# Patient Record
Sex: FEMALE | Race: WHITE | NOT HISPANIC OR LATINO | Employment: PART TIME | URBAN - METROPOLITAN AREA
[De-identification: names, ages, dates, MRNs, and addresses within clinical notes are randomized per-mention and may not be internally consistent; named-entity substitution may affect disease eponyms.]

---

## 2017-11-19 PROBLEM — G47.30 SLEEP APNEA: Status: ACTIVE | Noted: 2017-11-19

## 2017-11-19 PROBLEM — I21.4 NSTEMI (NON-ST ELEVATED MYOCARDIAL INFARCTION): Status: ACTIVE | Noted: 2017-11-19

## 2017-11-19 PROBLEM — A41.9 SEVERE SEPSIS: Status: ACTIVE | Noted: 2017-11-19

## 2017-11-19 PROBLEM — R50.9 FEBRILE ILLNESS, ACUTE: Status: ACTIVE | Noted: 2017-11-19

## 2017-11-19 PROBLEM — R65.20 SEVERE SEPSIS: Status: ACTIVE | Noted: 2017-11-19

## 2017-11-19 PROBLEM — E66.9 OBESITY (BMI 30-39.9): Status: ACTIVE | Noted: 2017-11-19

## 2017-11-19 PROBLEM — E11.49 TYPE 2 DIABETES MELLITUS WITH NEUROLOGIC COMPLICATION: Status: ACTIVE | Noted: 2017-11-19

## 2017-11-20 PROBLEM — R78.81 BACTEREMIA: Status: ACTIVE | Noted: 2017-11-20

## 2017-11-20 PROBLEM — E66.9 OBESITY (BMI 30-39.9): Status: RESOLVED | Noted: 2017-11-19 | Resolved: 2017-11-20

## 2017-11-20 PROBLEM — G47.30 SLEEP APNEA: Status: RESOLVED | Noted: 2017-11-19 | Resolved: 2017-11-20

## 2017-11-21 PROBLEM — I48.91 ATRIAL FIBRILLATION: Status: ACTIVE | Noted: 2017-11-21

## 2017-11-22 PROBLEM — E87.6 HYPOKALEMIA: Status: ACTIVE | Noted: 2017-11-22

## 2017-11-23 PROBLEM — B95.62 BACTEREMIA DUE TO METHICILLIN RESISTANT STAPHYLOCOCCUS AUREUS: Status: ACTIVE | Noted: 2017-11-20

## 2017-11-25 PROBLEM — I50.32 CHRONIC DIASTOLIC HEART FAILURE: Status: ACTIVE | Noted: 2017-11-25

## 2017-11-26 PROBLEM — I48.19 PERSISTENT ATRIAL FIBRILLATION: Status: ACTIVE | Noted: 2017-11-21

## 2017-11-28 PROBLEM — I50.43 ACUTE ON CHRONIC COMBINED SYSTOLIC AND DIASTOLIC HEART FAILURE: Status: ACTIVE | Noted: 2017-11-25

## 2017-12-02 PROBLEM — I30.1 ACUTE BACTERIAL PERICARDITIS: Chronic | Status: ACTIVE | Noted: 2017-12-02

## 2017-12-02 PROBLEM — B96.89 ACUTE BACTERIAL PERICARDITIS: Chronic | Status: ACTIVE | Noted: 2017-12-02

## 2017-12-02 PROBLEM — I30.1 ACUTE BACTERIAL PERICARDITIS: Status: ACTIVE | Noted: 2017-12-02

## 2017-12-02 PROBLEM — B96.89 ACUTE BACTERIAL PERICARDITIS: Status: ACTIVE | Noted: 2017-12-02

## 2017-12-11 PROBLEM — R53.83 FATIGUE: Status: ACTIVE | Noted: 2017-12-11

## 2017-12-11 PROBLEM — E87.1 HYPONATREMIA: Status: ACTIVE | Noted: 2017-12-11

## 2017-12-11 PROBLEM — E86.0 DEHYDRATION, MODERATE: Status: ACTIVE | Noted: 2017-12-11

## 2017-12-11 PROBLEM — D64.9 SYMPTOMATIC ANEMIA: Status: ACTIVE | Noted: 2017-12-11

## 2017-12-11 PROBLEM — R79.89 LACTIC ACID BLOOD INCREASED: Status: ACTIVE | Noted: 2017-12-11

## 2017-12-12 PROBLEM — E86.0 DEHYDRATION: Status: ACTIVE | Noted: 2017-12-12

## 2017-12-13 PROBLEM — B37.81 CANDIDA ESOPHAGITIS: Status: ACTIVE | Noted: 2017-12-13

## 2017-12-14 PROBLEM — R50.9 FEBRILE ILLNESS, ACUTE: Status: RESOLVED | Noted: 2017-11-19 | Resolved: 2017-12-14

## 2017-12-18 ENCOUNTER — ANTI-COAG VISIT (OUTPATIENT)
Dept: CARDIOLOGY | Facility: CLINIC | Age: 57
End: 2017-12-18

## 2017-12-18 DIAGNOSIS — I48.19 PERSISTENT ATRIAL FIBRILLATION: ICD-10-CM

## 2017-12-18 LAB — INR PPP: 2.4 (ref 2–3)

## 2017-12-18 NOTE — PROGRESS NOTES
lvm for daughter, pt does not know coumadin dose and also need to verify if taking diflucan and iv daptomycin

## 2017-12-19 NOTE — PROGRESS NOTES
I s/w daughter yesterday; she had no idea of any meds or what is gong on with this pt.  She did say she would find out and call [facility] back with the information.  INR went up a full pint in 3 days.  Pt is on diflucan daily x 7days WE NEED TO VERIFY WHETHER SHE IS ACTUALLY TAKING IT.  I called both daughter, whose phone range then went to a busy signal, and pt's phone as well, which has NO VM.  I called [facility] and lvm with Ruby to obtain an INR tomorrow, (fax to follow).  Also, if they are able to reach pt/daughter, to help us ensure that pt does not get warfarin tonight.

## 2017-12-21 ENCOUNTER — ANTI-COAG VISIT (OUTPATIENT)
Dept: CARDIOLOGY | Facility: CLINIC | Age: 57
End: 2017-12-21

## 2017-12-21 DIAGNOSIS — I48.19 PERSISTENT ATRIAL FIBRILLATION: ICD-10-CM

## 2017-12-21 PROBLEM — I50.42 CHRONIC COMBINED SYSTOLIC AND DIASTOLIC HEART FAILURE: Status: ACTIVE | Noted: 2017-11-25

## 2017-12-26 ENCOUNTER — ANTI-COAG VISIT (OUTPATIENT)
Dept: CARDIOLOGY | Facility: CLINIC | Age: 57
End: 2017-12-26

## 2017-12-26 DIAGNOSIS — Z79.01 CURRENT USE OF LONG TERM ANTICOAGULATION: Primary | ICD-10-CM

## 2017-12-26 DIAGNOSIS — I48.19 PERSISTENT ATRIAL FIBRILLATION: ICD-10-CM

## 2017-12-26 LAB — INR PPP: 2.6 (ref 2–3)

## 2017-12-26 RX ORDER — WARFARIN 2 MG/1
TABLET ORAL
Qty: 30 TABLET | Refills: 5 | Status: SHIPPED | OUTPATIENT
Start: 2017-12-26 | End: 2018-10-24

## 2018-01-02 ENCOUNTER — ANTI-COAG VISIT (OUTPATIENT)
Dept: CARDIOLOGY | Facility: CLINIC | Age: 58
End: 2018-01-02

## 2018-01-02 DIAGNOSIS — I48.19 PERSISTENT ATRIAL FIBRILLATION: ICD-10-CM

## 2018-01-02 LAB — INR PPP: 1.5 (ref 2–3)

## 2018-01-09 ENCOUNTER — ANTI-COAG VISIT (OUTPATIENT)
Dept: CARDIOLOGY | Facility: CLINIC | Age: 58
End: 2018-01-09

## 2018-01-09 DIAGNOSIS — I48.19 PERSISTENT ATRIAL FIBRILLATION: ICD-10-CM

## 2018-01-09 LAB — INR PPP: 1.2 (ref 2–3)

## 2018-01-22 NOTE — PROGRESS NOTES
Pt called and states [facility] told pt that they did not have any orders for pt to have inr level done. Pt is currently in [place] and may have level done a op lab or [place] lab pt to call back to inform someone at the [facility]

## 2018-01-23 ENCOUNTER — ANTI-COAG VISIT (OUTPATIENT)
Dept: CARDIOLOGY | Facility: CLINIC | Age: 58
End: 2018-01-23

## 2018-01-23 DIAGNOSIS — I48.19 PERSISTENT ATRIAL FIBRILLATION: ICD-10-CM

## 2018-01-24 NOTE — PROGRESS NOTES
pt informed of dosing and next inr chk date; pt expressed understanding; need pt/inr order faxed to [facility] ([phone])

## 2018-01-29 ENCOUNTER — ANTI-COAG VISIT (OUTPATIENT)
Dept: CARDIOLOGY | Facility: CLINIC | Age: 58
End: 2018-01-29

## 2018-01-29 DIAGNOSIS — I48.19 PERSISTENT ATRIAL FIBRILLATION: ICD-10-CM

## 2018-01-29 LAB — INR PPP: 0.9 (ref 2–3)

## 2018-02-01 ENCOUNTER — ANTI-COAG VISIT (OUTPATIENT)
Dept: CARDIOLOGY | Facility: CLINIC | Age: 58
End: 2018-02-01

## 2018-02-01 DIAGNOSIS — I48.19 PERSISTENT ATRIAL FIBRILLATION: ICD-10-CM

## 2018-02-01 LAB — INR PPP: 1 (ref 2–3)

## 2018-02-06 ENCOUNTER — ANTI-COAG VISIT (OUTPATIENT)
Dept: CARDIOLOGY | Facility: CLINIC | Age: 58
End: 2018-02-06

## 2018-02-06 DIAGNOSIS — I48.19 PERSISTENT ATRIAL FIBRILLATION: ICD-10-CM

## 2018-02-06 LAB — INR PPP: 1.6 (ref 2–3)

## 2018-02-12 NOTE — PROGRESS NOTES
Attempted to call pt to remind her to go to lab on wed due to office being closed tomorrow for holiday no answer and unable to leave voicemail.

## 2018-02-16 ENCOUNTER — ANTI-COAG VISIT (OUTPATIENT)
Dept: CARDIOLOGY | Facility: CLINIC | Age: 58
End: 2018-02-16

## 2018-02-16 DIAGNOSIS — I48.19 PERSISTENT ATRIAL FIBRILLATION: ICD-10-CM

## 2018-02-16 LAB — INR PPP: 1.2 (ref 2–3)

## 2018-02-22 ENCOUNTER — ANTI-COAG VISIT (OUTPATIENT)
Dept: CARDIOLOGY | Facility: CLINIC | Age: 58
End: 2018-02-22

## 2018-02-22 DIAGNOSIS — I48.19 PERSISTENT ATRIAL FIBRILLATION: ICD-10-CM

## 2018-02-22 LAB — INR PPP: 1.5 (ref 2–3)

## 2018-02-26 ENCOUNTER — ANTI-COAG VISIT (OUTPATIENT)
Dept: CARDIOLOGY | Facility: CLINIC | Age: 58
End: 2018-02-26

## 2018-02-26 DIAGNOSIS — I48.19 PERSISTENT ATRIAL FIBRILLATION: ICD-10-CM

## 2018-02-26 LAB — INR PPP: 4.7 (ref 2–3)

## 2018-02-26 NOTE — PROGRESS NOTES
Spoke with pt gave dosing and next inr check date pt voiced understanding.    Pt reports she is taking hair skin and nails but has not even been a week since she has started she states she will discuss with dr christopher

## 2018-02-28 ENCOUNTER — ANTI-COAG VISIT (OUTPATIENT)
Dept: CARDIOLOGY | Facility: CLINIC | Age: 58
End: 2018-02-28

## 2018-02-28 DIAGNOSIS — I48.19 PERSISTENT ATRIAL FIBRILLATION: ICD-10-CM

## 2018-02-28 LAB — INR PPP: 1.9 (ref 2–3)

## 2018-03-05 LAB — INR PPP: 1.2 (ref 2–3)

## 2018-03-06 ENCOUNTER — ANTI-COAG VISIT (OUTPATIENT)
Dept: CARDIOLOGY | Facility: CLINIC | Age: 58
End: 2018-03-06

## 2018-03-06 DIAGNOSIS — I48.19 PERSISTENT ATRIAL FIBRILLATION: ICD-10-CM

## 2018-03-14 ENCOUNTER — ANTI-COAG VISIT (OUTPATIENT)
Dept: CARDIOLOGY | Facility: CLINIC | Age: 58
End: 2018-03-14

## 2018-03-14 DIAGNOSIS — I48.19 PERSISTENT ATRIAL FIBRILLATION: ICD-10-CM

## 2018-03-14 LAB — INR PPP: 3.5 (ref 2–3)

## 2018-03-19 ENCOUNTER — ANTI-COAG VISIT (OUTPATIENT)
Dept: CARDIOLOGY | Facility: CLINIC | Age: 58
End: 2018-03-19

## 2018-03-19 DIAGNOSIS — I48.19 PERSISTENT ATRIAL FIBRILLATION: ICD-10-CM

## 2018-03-19 LAB — INR PPP: 1.1 (ref 2–3)

## 2018-03-22 LAB — INR PPP: 1 (ref 2–3)

## 2018-03-23 ENCOUNTER — ANTI-COAG VISIT (OUTPATIENT)
Dept: CARDIOLOGY | Facility: CLINIC | Age: 58
End: 2018-03-23

## 2018-03-23 DIAGNOSIS — I48.19 PERSISTENT ATRIAL FIBRILLATION: ICD-10-CM

## 2018-03-23 NOTE — PROGRESS NOTES
pt will flush 5mg tablets; pt informed of dosing and next inr chk date; pt expressed understanding

## 2018-03-26 ENCOUNTER — ANTI-COAG VISIT (OUTPATIENT)
Dept: CARDIOLOGY | Facility: CLINIC | Age: 58
End: 2018-03-26

## 2018-03-26 DIAGNOSIS — I48.19 PERSISTENT ATRIAL FIBRILLATION: ICD-10-CM

## 2018-03-26 LAB — INR PPP: 1.2 (ref 2–3)

## 2018-04-02 LAB — INR PPP: 1.1 (ref 2–3)

## 2018-04-03 ENCOUNTER — ANTI-COAG VISIT (OUTPATIENT)
Dept: CARDIOLOGY | Facility: CLINIC | Age: 58
End: 2018-04-03

## 2018-04-03 DIAGNOSIS — I48.19 PERSISTENT ATRIAL FIBRILLATION: ICD-10-CM

## 2018-04-05 LAB — INR PPP: 1.3 (ref 2–3)

## 2018-04-06 ENCOUNTER — ANTI-COAG VISIT (OUTPATIENT)
Dept: CARDIOLOGY | Facility: CLINIC | Age: 58
End: 2018-04-06

## 2018-04-06 DIAGNOSIS — I48.19 PERSISTENT ATRIAL FIBRILLATION: ICD-10-CM

## 2018-04-06 NOTE — PROGRESS NOTES
Informed pt of dose and next check date. Pt voiced understanding. Pt states she took 2 mg last night.

## 2018-04-09 ENCOUNTER — ANTI-COAG VISIT (OUTPATIENT)
Dept: CARDIOLOGY | Facility: CLINIC | Age: 58
End: 2018-04-09

## 2018-04-09 DIAGNOSIS — I48.19 PERSISTENT ATRIAL FIBRILLATION: ICD-10-CM

## 2018-04-09 LAB — INR PPP: 2.3 (ref 2–3)

## 2018-04-12 LAB — INR PPP: 1.9 (ref 2–3)

## 2018-04-13 ENCOUNTER — ANTI-COAG VISIT (OUTPATIENT)
Dept: CARDIOLOGY | Facility: CLINIC | Age: 58
End: 2018-04-13

## 2018-04-13 DIAGNOSIS — I48.19 PERSISTENT ATRIAL FIBRILLATION: ICD-10-CM

## 2018-05-01 LAB — INR PPP: 5.2 (ref 2–3)

## 2018-05-02 ENCOUNTER — ANTI-COAG VISIT (OUTPATIENT)
Dept: CARDIOLOGY | Facility: CLINIC | Age: 58
End: 2018-05-02

## 2018-05-02 DIAGNOSIS — I48.19 PERSISTENT ATRIAL FIBRILLATION: ICD-10-CM

## 2018-05-02 NOTE — PROGRESS NOTES
Spoke to pt.  Dose questioned.  2mg tablet confirmed.  Takes 2mg tablets.  Pt confirms higher dose than was instructed to take since last encounter.  Denies any bleeding problems.  No other changes or problems reported. INR elevated today.  Pt instructed to Hold x 3 days & eat green leafy vegetable tonight. Stated she would eat some Cabbage tonight.  Take 4mg or (2 tabs) 5/5/18 & 5/6/18.  Recheck INR Monday 5/7/18 at [facility].  Pt able to restate instructions.

## 2018-05-07 ENCOUNTER — ANTI-COAG VISIT (OUTPATIENT)
Dept: CARDIOLOGY | Facility: CLINIC | Age: 58
End: 2018-05-07

## 2018-05-07 DIAGNOSIS — I48.19 PERSISTENT ATRIAL FIBRILLATION: ICD-10-CM

## 2018-05-07 LAB — INR PPP: 1.3 (ref 2–3)

## 2018-05-08 NOTE — PROGRESS NOTES
Spoke to pt. Did not take boost 5/7/18.  Took 4mg last HS.  Pt reports she now has 5mg tablets.  No other changes or problems reported. INR low today. Pt to start new 5mg tablets.   Increase 5/8/18. Start new dose & recheck next week at [facility] per Sultana Ford, Pharm D . Pt able to restate instructions.

## 2018-05-17 LAB — INR PPP: 1.4 (ref 2–3)

## 2018-05-18 ENCOUNTER — ANTI-COAG VISIT (OUTPATIENT)
Dept: CARDIOLOGY | Facility: CLINIC | Age: 58
End: 2018-05-18

## 2018-05-18 DIAGNOSIS — I48.19 PERSISTENT ATRIAL FIBRILLATION: ICD-10-CM

## 2018-05-22 LAB — INR PPP: 1.5 (ref 2–3)

## 2018-05-23 ENCOUNTER — ANTI-COAG VISIT (OUTPATIENT)
Dept: CARDIOLOGY | Facility: CLINIC | Age: 58
End: 2018-05-23

## 2018-05-23 DIAGNOSIS — I48.19 PERSISTENT ATRIAL FIBRILLATION: ICD-10-CM

## 2018-05-23 NOTE — PROGRESS NOTES
Spoke with pt gave dosing and next inr check date pt voiced understanding.   Aware to go to lab early pt reports she took 10mg on Tuesday 05/22

## 2018-05-23 NOTE — PROGRESS NOTES
Spoke with pt for questioning she needs to find her paper with the dosing from last week and she will call clinic back for further questioning

## 2018-05-28 LAB — INR PPP: 1.1 (ref 2–3)

## 2018-05-29 ENCOUNTER — ANTI-COAG VISIT (OUTPATIENT)
Dept: CARDIOLOGY | Facility: CLINIC | Age: 58
End: 2018-05-29

## 2018-05-29 DIAGNOSIS — I48.19 PERSISTENT ATRIAL FIBRILLATION: ICD-10-CM

## 2018-05-29 NOTE — PROGRESS NOTES
Patient confirms dose and compliance. Denies missed doses.  No changes or problems reported. INR is subtherapeutic. Will boost today, then increase dose and recheck next week.  Patient verbalizes understanding.  Cc education mailed to patient.

## 2018-06-07 ENCOUNTER — ANTI-COAG VISIT (OUTPATIENT)
Dept: CARDIOLOGY | Facility: CLINIC | Age: 58
End: 2018-06-07

## 2018-06-07 DIAGNOSIS — I48.19 PERSISTENT ATRIAL FIBRILLATION: ICD-10-CM

## 2018-06-07 LAB — INR PPP: 1.9 (ref 2–3)

## 2018-06-29 ENCOUNTER — ANTI-COAG VISIT (OUTPATIENT)
Dept: CARDIOLOGY | Facility: CLINIC | Age: 58
End: 2018-06-29

## 2018-06-29 DIAGNOSIS — I48.19 PERSISTENT ATRIAL FIBRILLATION: ICD-10-CM

## 2018-06-29 LAB — INR PPP: 2.6 (ref 2–3)

## 2018-07-13 NOTE — PROGRESS NOTES
S/w pt and she states that her doctor Dr Christy Bustillo s/w Dr Portillo's and he approved pt to be off the coumadin. This is confounding as coumadin is not typically held for an MRI.  I have sent a message to Dr Portillo's regarding this as well.    I have advised pt to call [facility] back when MD tell her when she can restart, but check status after wed

## 2018-07-13 NOTE — PROGRESS NOTES
Pt reports she is having an MRI C contrast and was told to be off coumadin for 7 days, pt states Dr. Sanchez states this is ok, and also told pt that the radiologist will let her know when she needs to restart her coumadin because she will need to be off coumadin a period of time after the MRI is done.

## 2018-07-19 ENCOUNTER — ANTI-COAG VISIT (OUTPATIENT)
Dept: CARDIOLOGY | Facility: CLINIC | Age: 58
End: 2018-07-19

## 2018-07-19 DIAGNOSIS — I48.19 PERSISTENT ATRIAL FIBRILLATION: ICD-10-CM

## 2018-07-19 LAB — INR PPP: 1.1 (ref 2–3)

## 2018-07-26 LAB — INR PPP: 1.8 (ref 2–3)

## 2018-07-27 ENCOUNTER — ANTI-COAG VISIT (OUTPATIENT)
Dept: CARDIOLOGY | Facility: CLINIC | Age: 58
End: 2018-07-27

## 2018-07-27 DIAGNOSIS — I48.19 PERSISTENT ATRIAL FIBRILLATION: ICD-10-CM

## 2018-08-23 ENCOUNTER — ANTI-COAG VISIT (OUTPATIENT)
Dept: CARDIOLOGY | Facility: CLINIC | Age: 58
End: 2018-08-23

## 2018-08-23 DIAGNOSIS — I48.19 PERSISTENT ATRIAL FIBRILLATION: ICD-10-CM

## 2018-08-23 LAB — INR PPP: 2.8 (ref 2–3)

## 2018-08-24 PROBLEM — I30.1 ACUTE BACTERIAL PERICARDITIS: Chronic | Status: RESOLVED | Noted: 2017-12-02 | Resolved: 2018-08-24

## 2018-08-24 PROBLEM — I51.89 DIASTOLIC DYSFUNCTION: Status: ACTIVE | Noted: 2018-08-24

## 2018-08-24 PROBLEM — I25.2 HISTORY OF NON-ST ELEVATION MYOCARDIAL INFARCTION (NSTEMI): Status: ACTIVE | Noted: 2017-11-19

## 2018-08-24 PROBLEM — A41.9 SEVERE SEPSIS: Status: RESOLVED | Noted: 2017-11-19 | Resolved: 2018-08-24

## 2018-08-24 PROBLEM — B37.81 CANDIDA ESOPHAGITIS: Status: RESOLVED | Noted: 2017-12-13 | Resolved: 2018-08-24

## 2018-08-24 PROBLEM — D64.9 SYMPTOMATIC ANEMIA: Status: RESOLVED | Noted: 2017-12-11 | Resolved: 2018-08-24

## 2018-08-24 PROBLEM — B96.89 ACUTE BACTERIAL PERICARDITIS: Chronic | Status: RESOLVED | Noted: 2017-12-02 | Resolved: 2018-08-24

## 2018-08-24 PROBLEM — E87.6 HYPOKALEMIA: Status: RESOLVED | Noted: 2017-11-22 | Resolved: 2018-08-24

## 2018-08-24 PROBLEM — Z79.4 TYPE 2 DIABETES MELLITUS WITHOUT COMPLICATION, WITH LONG-TERM CURRENT USE OF INSULIN: Status: RESOLVED | Noted: 2017-11-19 | Resolved: 2018-08-24

## 2018-08-24 PROBLEM — E11.9 TYPE 2 DIABETES MELLITUS WITHOUT COMPLICATION, WITH LONG-TERM CURRENT USE OF INSULIN: Status: RESOLVED | Noted: 2017-11-19 | Resolved: 2018-08-24

## 2018-08-24 PROBLEM — R78.81 BACTEREMIA DUE TO METHICILLIN RESISTANT STAPHYLOCOCCUS AUREUS: Status: RESOLVED | Noted: 2017-11-20 | Resolved: 2018-08-24

## 2018-08-24 PROBLEM — E86.0 DEHYDRATION: Status: RESOLVED | Noted: 2017-12-12 | Resolved: 2018-08-24

## 2018-08-24 PROBLEM — Z79.4 TYPE 2 DIABETES MELLITUS WITHOUT COMPLICATION, WITH LONG-TERM CURRENT USE OF INSULIN: Status: ACTIVE | Noted: 2017-11-19

## 2018-08-24 PROBLEM — E86.0 DEHYDRATION, MODERATE: Status: RESOLVED | Noted: 2017-12-11 | Resolved: 2018-08-24

## 2018-08-24 PROBLEM — E11.9 TYPE 2 DIABETES MELLITUS WITHOUT COMPLICATION, WITH LONG-TERM CURRENT USE OF INSULIN: Status: ACTIVE | Noted: 2017-11-19

## 2018-08-24 PROBLEM — R65.20 SEVERE SEPSIS: Status: RESOLVED | Noted: 2017-11-19 | Resolved: 2018-08-24

## 2018-08-24 PROBLEM — B95.62 BACTEREMIA DUE TO METHICILLIN RESISTANT STAPHYLOCOCCUS AUREUS: Status: RESOLVED | Noted: 2017-11-20 | Resolved: 2018-08-24

## 2018-08-24 PROBLEM — E87.1 HYPONATREMIA: Status: RESOLVED | Noted: 2017-12-11 | Resolved: 2018-08-24

## 2018-09-13 NOTE — PROGRESS NOTES
9/13/18 @7079  YORDAN [facility] [phone] (t), [phone] (f) called for holding instructions for 6 upper, 4 lower tooth extractions for dentures, date to be decided when holding instructions received.  Justa to return call with scheduled date.  Routed to PharmD for dosing, pending fax to [facility].

## 2018-09-14 NOTE — PROGRESS NOTES
Patient ok to hold warfarin 3 days prior to procedure. Please let us know procedure date so we can advise patient of holding instructions and set up follow up visit.

## 2018-09-21 PROBLEM — R53.83 FATIGUE: Status: RESOLVED | Noted: 2017-12-11 | Resolved: 2018-09-21

## 2018-09-21 PROBLEM — I48.19 PERSISTENT ATRIAL FIBRILLATION: Status: RESOLVED | Noted: 2017-11-21 | Resolved: 2018-09-21

## 2019-02-17 PROBLEM — I30.9 ACUTE PERICARDITIS: Status: ACTIVE | Noted: 2019-02-17

## 2019-02-17 PROBLEM — I21.4 NSTEMI (NON-ST ELEVATED MYOCARDIAL INFARCTION): Status: ACTIVE | Noted: 2019-02-17

## 2019-02-17 PROBLEM — I48.0 PAROXYSMAL ATRIAL FIBRILLATION: Status: ACTIVE | Noted: 2019-02-17

## 2019-02-17 PROBLEM — I38 ENDOCARDITIS: Status: ACTIVE | Noted: 2019-02-17

## 2019-02-17 PROBLEM — I31.9 PERICARDITIS: Status: ACTIVE | Noted: 2019-02-17

## 2019-02-17 PROBLEM — G47.33 OSA (OBSTRUCTIVE SLEEP APNEA): Status: ACTIVE | Noted: 2017-11-19

## 2019-02-18 PROBLEM — A41.9 SEPSIS: Status: ACTIVE | Noted: 2019-02-18

## 2019-02-18 PROBLEM — R50.9 FEVER: Status: ACTIVE | Noted: 2019-02-18

## 2019-02-18 PROBLEM — D50.9 IRON DEFICIENCY ANEMIA: Status: ACTIVE | Noted: 2019-02-18

## 2019-02-18 PROBLEM — R05.9 COUGH: Status: ACTIVE | Noted: 2019-02-18

## 2019-02-19 PROBLEM — R78.81 POSITIVE BLOOD CULTURE: Status: ACTIVE | Noted: 2019-02-19

## 2019-02-22 PROBLEM — R79.89 TROPONIN LEVEL ELEVATED: Status: ACTIVE | Noted: 2019-02-22

## 2019-02-24 PROBLEM — E66.9 OBESITY: Status: ACTIVE | Noted: 2019-02-24

## 2019-03-01 ENCOUNTER — TELEPHONE (OUTPATIENT)
Dept: INFECTIOUS DISEASES | Facility: CLINIC | Age: 59
End: 2019-03-01

## 2019-03-01 NOTE — TELEPHONE ENCOUNTER
----- Message from Kim Dean sent at 3/1/2019 12:27 PM CST -----  Type: Needs Medical Advice    Who Called:  Carolina with [facility]  Best Call Back Number: [phone]  Additional Information: Requesting to speak with nurse/please call back (only information given)

## 2019-03-06 ENCOUNTER — TELEPHONE (OUTPATIENT)
Dept: INFECTIOUS DISEASES | Facility: CLINIC | Age: 59
End: 2019-03-06

## 2019-03-06 PROBLEM — Z79.01 CHRONIC ANTICOAGULATION: Status: ACTIVE | Noted: 2019-03-06

## 2019-03-06 NOTE — TELEPHONE ENCOUNTER
Spoke with Sandra and instructed per Dr Lazcano-Do not send out any Vanco until she sees the creatinine results from todays's lab draw at [facility], which will be resulted later this afternoon, as the specimen is still at the [facility] awaiting  delivery to [facility]. Understanding verbalized.

## 2019-03-06 NOTE — TELEPHONE ENCOUNTER
----- Message from Jai Randall sent at 3/6/2019  4:19 PM CST -----  Contact: Sandra Martino  Type:  Patient Returning Call    Who Called:  Sandra Martino  Who Left Message for Patient:  Mukeshblanquitazenobia  Does the patient know what this is regarding?:  medication  Best Call Back Number:  [phone]  Ask for Andres Flores

## 2019-03-11 ENCOUNTER — TELEPHONE (OUTPATIENT)
Dept: INFECTIOUS DISEASES | Facility: CLINIC | Age: 59
End: 2019-03-11

## 2019-03-11 NOTE — TELEPHONE ENCOUNTER
----- Message from Karmen Riberajumana sent at 3/11/2019  2:50 PM CDT -----  Contact: Sandra with [facility]  Sandra states that the patients initial stop date is tomorrow  for her IV antibiotics, do you want to make up the days she missed or just discontinue tomorrow.  Call back at [phone].  Thanks

## 2019-03-11 NOTE — TELEPHONE ENCOUNTER
----- Message from Karmen Riberajumana sent at 3/11/2019  2:50 PM CDT -----  Contact: Sandra with [facility]  Sandra states that the patients initial stop date is tomorrow  for her IV antibiotics, do you want to make up the days she missed or just discontinue tomorrow.  Call back at [phone].  Thanks

## 2019-03-11 NOTE — TELEPHONE ENCOUNTER
----- Message from Karmen Riberajumana sent at 3/11/2019  2:50 PM CDT -----  Contact: Sandra with [facility]  Sandra states that the patients initial stop date is tomorrow  for her IV antibiotics, do you want to make up the days she missed or just discontinue tomorrow.  Call back at [phone].  Thanks

## 2019-03-11 NOTE — TELEPHONE ENCOUNTER
Returned call-Sandra not in-Spoke with Andres and instructed that the doses were held because the trough was not therapeutic and Dr Lazcano does not want to add those doses on to the end of therapy which is supposed to be 3/14. This SN instructed Andres to change the EOC from 3/12 to the original EOC that was ordered by Dr Lazcano and this date was also on the D/C summary. Understanding verbalized.

## 2019-03-21 ENCOUNTER — TELEPHONE (OUTPATIENT)
Dept: INFECTIOUS DISEASES | Facility: CLINIC | Age: 59
End: 2019-03-21

## 2019-03-21 NOTE — TELEPHONE ENCOUNTER
----- Message from Una Stuart sent at 3/21/2019  3:02 PM CDT -----  Contact: Sandra from [facility]  Type: Needs Medical Advice    Who Called:  Sandra from [facility]  Best Call Back Number: [phone]  Additional Information: would like for the nurse to give her a call back

## 2019-05-09 NOTE — PROGRESS NOTES
Please get the date of the procedure or have patient call with the date once scheduled.    Admit/Transfer to Inpatient Psychiatry

## 2019-11-19 PROBLEM — K92.2 GI BLEED: Status: ACTIVE | Noted: 2019-11-19

## 2019-11-19 PROBLEM — D64.9 SYMPTOMATIC ANEMIA: Status: ACTIVE | Noted: 2019-11-19

## 2019-11-19 PROBLEM — E11.9 DIABETES MELLITUS: Status: ACTIVE | Noted: 2019-11-19

## 2019-11-19 PROBLEM — K59.00 CONSTIPATION: Status: ACTIVE | Noted: 2019-11-19

## 2019-12-06 ENCOUNTER — TELEPHONE (OUTPATIENT)
Dept: HEMATOLOGY/ONCOLOGY | Facility: CLINIC | Age: 59
End: 2019-12-06

## 2019-12-06 NOTE — TELEPHONE ENCOUNTER
Spoke to patient and the ER  has referred patient to hematology due to patient needing Iron.  Told patient I would call her back next week with appointment.

## 2019-12-19 ENCOUNTER — INITIAL CONSULT (OUTPATIENT)
Dept: HEMATOLOGY/ONCOLOGY | Facility: CLINIC | Age: 59
End: 2019-12-19
Payer: MEDICAID

## 2019-12-19 VITALS
HEIGHT: 63 IN | HEART RATE: 97 BPM | OXYGEN SATURATION: 98 % | DIASTOLIC BLOOD PRESSURE: 62 MMHG | BODY MASS INDEX: 37.5 KG/M2 | TEMPERATURE: 99 F | RESPIRATION RATE: 18 BRPM | SYSTOLIC BLOOD PRESSURE: 116 MMHG | WEIGHT: 211.63 LBS

## 2019-12-19 DIAGNOSIS — I25.2 HISTORY OF NON-ST ELEVATION MYOCARDIAL INFARCTION (NSTEMI): ICD-10-CM

## 2019-12-19 DIAGNOSIS — E66.01 CLASS 2 SEVERE OBESITY WITH SERIOUS COMORBIDITY AND BODY MASS INDEX (BMI) OF 37.0 TO 37.9 IN ADULT, UNSPECIFIED OBESITY TYPE: ICD-10-CM

## 2019-12-19 DIAGNOSIS — Z79.4 TYPE 2 DIABETES MELLITUS WITHOUT COMPLICATION, WITH LONG-TERM CURRENT USE OF INSULIN: ICD-10-CM

## 2019-12-19 DIAGNOSIS — D50.9 IRON DEFICIENCY ANEMIA, UNSPECIFIED IRON DEFICIENCY ANEMIA TYPE: Primary | ICD-10-CM

## 2019-12-19 DIAGNOSIS — E11.9 TYPE 2 DIABETES MELLITUS WITHOUT COMPLICATION, WITH LONG-TERM CURRENT USE OF INSULIN: ICD-10-CM

## 2019-12-19 PROBLEM — I30.9 ACUTE PERICARDITIS: Status: RESOLVED | Noted: 2019-02-17 | Resolved: 2019-12-19

## 2019-12-19 PROBLEM — R79.89 LACTIC ACID BLOOD INCREASED: Status: RESOLVED | Noted: 2017-12-11 | Resolved: 2019-12-19

## 2019-12-19 PROCEDURE — 99999 PR PBB SHADOW E&M-EST. PATIENT-LVL III: ICD-10-PCS | Mod: PBBFAC,,, | Performed by: INTERNAL MEDICINE

## 2019-12-19 PROCEDURE — 99213 OFFICE O/P EST LOW 20 MIN: CPT | Mod: PBBFAC,PN | Performed by: INTERNAL MEDICINE

## 2019-12-19 PROCEDURE — 99205 OFFICE O/P NEW HI 60 MIN: CPT | Mod: S$PBB,,, | Performed by: INTERNAL MEDICINE

## 2019-12-19 PROCEDURE — 99999 PR PBB SHADOW E&M-EST. PATIENT-LVL III: CPT | Mod: PBBFAC,,, | Performed by: INTERNAL MEDICINE

## 2019-12-19 PROCEDURE — 99205 PR OFFICE/OUTPT VISIT, NEW, LEVL V, 60-74 MIN: ICD-10-PCS | Mod: S$PBB,,, | Performed by: INTERNAL MEDICINE

## 2019-12-19 NOTE — PROGRESS NOTES
INITIAL CONSULTATION     DATE: 12/19/2019  Patient Name: Amanda Barton  Referred by:   Reason for referral: anemia      Subjective:       Patient ID: Amanda Barton is a 59 y.o. female.    Chief Complaint: Anemia    HPI      REVIEW OF RECORDS PRIOR TO VISIT SHOW:  06/20/2008 hemoglobin 12.9, platelets and white count normal, MCV 86 and normal RDW  11/19/2017 hemoglobin 10.3, normal RDW and white count.  This was admission 2 weeks, infection MRSA and blood, anemia  12/11/2017 iron sat 12, iron serum normal 48  12/14/2017 hemoglobin 7.0, normal white count and platelets  12/20/2017 hemoglobin 10  08/24/2018 hemoglobin 10.6  02/17/2019 ferritin is 66, CRP 21.9, iron sat 7, iron serum 21  02/21/2019 received blood transfusion 1 unit  02/26/2019 hemoglobin 8.5  03/06/2019 hemoglobin is 10, MCV low 78, RDW high 18, normal platelets and white count  11/19/2019 transferred from [facility] with anemia and heme-positive stool, on NSAIDs for back pain and longstanding history of anemia per discharge summary, discharge 11/22 11/19/2019 received blood transfusion  11/20/2019 ferritin is 16  11/21/19 - ferric gluconate IV 125mg  11/22/2019 hemoglobin is 8.5  11/22/19 - EGD - gastritis, otherwise normal  12/12/2019 ferritin is 10, hemoglobin is 9.7    The patient presents today in confirms above history and reports overall somewhat poor health, uncontrolled diabetes depression.  Reviewed recent hospitalization details.  Long history iron deficiency anemia.  Reports she received 1 dose of IV iron in the hospital recently which on review appears to be 125 mg IV, in addition to her blood transfusions.  She otherwise has not tolerated oral iron  She reports she is scheduled for IV iron on 12/24 and 1231 already prior to coming here today.    Patient is on Coumadin prior to now, stopped last hospitalization due to bleeding.   She has been on thsi since endocarditis fall 2017 with cardiology, Dr. Steve, per her report  "follows up with him today the record reflects this is more likely due to paroxysmal atrial fibrillation   She denies valve surgery, heart surgery    She reports new pain and numbness left arm and intensity discussed with Dr. christopher today   She denies CAD to her knowledge but then reports "light heart attack" in past.     She reports DM "bad", 's yesterday, has been hard to control with recent admissions  She feels change in insulin has decreased control of her BG. This was changed to previous regimen yesterday  so she is hopeful this will improve. Yamile Figueroa is primary.      232 to 212 weight loss since 2019, appetite is decreased    Social situation complex, father and brother  holidays, mother with dementia that her and her sister care for, one of her kids in assisted, she adopted three grandkids who cannot afford dianne this year.   Increased cymbalta and on wellbutrin and starting xanax.   insomnia      Past Medical History:   Diagnosis Date    Acute bacterial pericarditis 2017    Anemia     Bacteremia due to methicillin resistant Staphylococcus aureus 2017    Diabetes mellitus     Encounter for blood transfusion     GERD (gastroesophageal reflux disease)     Heart murmur     Hypertension     Migraine headache     Type 2 diabetes mellitus without complication, with long-term current use of insulin 2017     Past Surgical History:   Procedure Laterality Date    APPENDECTOMY       SECTION      CHOLECYSTECTOMY      COLONOSCOPY N/A 2017    Procedure: COLONOSCOPY;  Surgeon: Juan Lepe MD;  Location: [facility];  Service: Endoscopy;  Laterality: N/A;    ESOPHAGOGASTRODUODENOSCOPY N/A 2019    Procedure: EGD (ESOPHAGOGASTRODUODENOSCOPY);  Surgeon: Gustavo Austin MD;  Location: [facility];  Service: Endoscopy;  Laterality: N/A;  with Push Enteroscopy    ESOPHAGOGASTRODUODENOSCOPY N/A 2019    Procedure: EGD " (ESOPHAGOGASTRODUODENOSCOPY);  Surgeon: Gustavo Austin MD;  Location: [facility];  Service: Endoscopy;  Laterality: N/A;  Push enteroscopy    HYSTERECTOMY       Social History     Socioeconomic History    Marital status:      Spouse name: Not on file    Number of children: Not on file    Years of education: Not on file    Highest education level: Not on file   Occupational History    Not on file   Social Needs    Financial resource strain: Not on file    Food insecurity:     Worry: Not on file     Inability: Not on file    Transportation needs:     Medical: Not on file     Non-medical: Not on file   Tobacco Use    Smoking status: Never Smoker    Smokeless tobacco: Never Used   Substance and Sexual Activity    Alcohol use: No    Drug use: Not on file    Sexual activity: Not on file   Lifestyle    Physical activity:     Days per week: Not on file     Minutes per session: Not on file    Stress: Not on file   Relationships    Social connections:     Talks on phone: Not on file     Gets together: Not on file     Attends Rastafarian service: Not on file     Active member of club or organization: Not on file     Attends meetings of clubs or organizations: Not on file     Relationship status: Not on file   Other Topics Concern    Not on file   Social History Narrative    Not on file     Family History   Problem Relation Age of Onset    No Known Problems Mother     Heart disease Father     Heart failure Father     Arrhythmia Father        Current Outpatient Medications   Medication Sig Dispense Refill    aspirin (ECOTRIN) 81 MG EC tablet Take 1 tablet (81 mg total) by mouth once daily. 30 tablet 11    blood sugar diagnostic (TRUETRACK TEST) Strp       buPROPion (WELLBUTRIN SR) 100 MG TBSR 12 hr tablet Take 1 tablet by mouth 2 (two) times daily.      carvedilol (COREG) 3.125 MG tablet Take 3.125 mg by mouth 2 (two) times daily.      doxepin (SINEQUAN) 25 MG capsule Take 25 mg by mouth  once daily.  5    DULoxetine (CYMBALTA) 60 MG capsule Take 60 mg by mouth once daily.   5    folic acid (FOLVITE) 1 MG tablet Take 1 mg by mouth Daily.      gabapentin (NEURONTIN) 800 MG tablet Take 800 mg by mouth 3 (three) times daily.       hydrocortisone 2.5 % ointment 1 application as needed (itching).       hydrOXYzine HCl (ATARAX) 25 MG tablet Take 25 mg by mouth nightly.  2    insulin lispro (HUMALOG KWIKPEN INSULIN) 100 unit/mL pen Inject 25 Units into the skin 3 (three) times daily with meals. 22.5 mL 11    JARDIANCE 25 mg Tab Take 1 tablet by mouth once daily.  5    lansoprazole (PREVACID 24HR) 15 MG capsule Take 1 capsule by mouth 2 (two) times daily.       metFORMIN (GLUCOPHAGE) 1000 MG tablet Take 1 tablet by mouth 2 (two) times daily.  1    montelukast (SINGULAIR) 10 mg tablet Take 10 mg by mouth every evening.  6    promethazine (PHENERGAN) 12.5 MG Tab Take 1 tablet (12.5 mg total) by mouth every 6 (six) hours as needed. 10 tablet 0    sennosides (SENNA) 8.6 mg Cap Take 1 capsule by mouth once daily. Hold if with diarrhea  0    TRESIBA FLEXTOUCH U-200 200 unit/mL (3 mL) InPn Inject 100 Units into the skin every evening.  5    ferrous sulfate 324 mg (65 mg iron) TbEC Take 1 tablet (324 mg total) by mouth 2 (two) times daily. (Patient not taking: Reported on 12/12/2019) 60 tablet 0     No current facility-administered medications for this visit.      ALLERGIES REVIEWED    Review of Systems    Constitutional:  Positive for activity change, appetite change, positive fatigue, fever and unexpected weight change.   HENT: Negative for mouth sores and sore throat.    Respiratory: Negative for cough and shortness of breath.    Cardiovascular: Negative for chest pain, palpitations and leg swelling.   Gastrointestinal: Negative for abdominal pain, constipation and diarrhea.   Genitourinary: Negative for dysuria and frequency.   Musculoskeletal: Negative for back pain and joint swelling.  "  Neurological:  Positive for weakness, light-headedness and numbness.   Hematological: Does not bruise/bleed easily.   Skin: no rash, no lesions, no itching    Objective:      /62   Pulse 97   Temp 98.7 °F (37.1 °C) (Oral)   Resp 18   Ht 5' 3" (1.6 m)   Wt 96 kg (211 lb 10.3 oz)   SpO2 98%   BMI 37.49 kg/m²    Wt Readings from Last 25 Encounters:   12/19/19 96 kg (211 lb 10.3 oz)   12/12/19 96.3 kg (212 lb 3.2 oz)   11/19/19 95.7 kg (211 lb)   03/06/19 98.6 kg (217 lb 6.4 oz)   03/01/19 103 kg (227 lb)   02/18/19 103.4 kg (227 lb 15.3 oz)   01/30/19 108.4 kg (239 lb)   10/24/18 102.1 kg (225 lb)   09/21/18 100.3 kg (221 lb 1.6 oz)   08/24/18 103.8 kg (228 lb 14.4 oz)   07/27/18 104.8 kg (231 lb)   04/11/18 97.5 kg (215 lb)   01/02/18 97.1 kg (214 lb)   12/28/17 95.4 kg (210 lb 4.8 oz)   12/20/17 94.2 kg (207 lb 11.2 oz)   12/15/17 96.8 kg (213 lb 6.5 oz)   12/04/17 95.5 kg (210 lb 8.6 oz)       Physical Exam    Constitutional: She is oriented to person, place, and time. No distress. Overweight.  Disheveled. Appears depressed.  HENT: Mouth/Throat: Oropharynx is clear and moist. No oropharyngeal exudate.   Eyes: Conjunctivae and EOM are normal.   Neck: Normal range of motion. Neck supple.   Cardiovascular: Normal rate, regular rhythm, normal heart sounds and intact distal pulses.    Pulmonary/Chest: Effort normal and breath sounds normal. She has no wheezes. She has no rales.   Abdominal: Soft. Bowel sounds are normal. She exhibits no distension. There is no tenderness.   Musculoskeletal: She exhibits no edema or tenderness.   Lymphadenopathy:   She has no cervical adenopathy.   Neurological: She is alert and oriented to person, place, and time. She has normal strength. No cranial nerve deficit or sensory deficit.   Skin: Skin is warm and dry. No rash noted. No erythema.  Skin excoriations noted face arms  Psychiatric: She has a depressed mood and affect. Her speech is normal.   Nursing note and vitals " reviewed.    No results found for this or any previous visit (from the past 72 hour(s)).    Assessment:       1. Iron deficiency anemia, unspecified iron deficiency anemia type    2. Type 2 diabetes mellitus without complication, with long-term current use of insulin    3. History of non-ST elevation myocardial infarction (NSTEMI)          ECOG 2    Patient is a  59 y.o. female here with diagnosis iron deficiency thought secondary chronic GI bleeding on anticoagulation as well as requires NSAIDs for chronic back pain. Most recent labs indicate improvement after in hospital admission and transfusion of blood and IV iron and she reports she already has IV iron scheduled in the next 2 weeks x2 doses.  Agree with plan and oral iron not tolerated per her report.     Discussed differential diagnosis, work-up, in great detail with patient, including cause GI bleeding especially with anticoagulation, this may improve now that she is off Coumadin and NSAIDs that she will require intensive the future periodically due to chronic pain and she reports is not in given option for any other pain medication.    Also reviewed her depression and uncontrolled diabetes importance of continuing follow-up, she has recent medication change hopefully this will help as well as in her iron deficiency is corrected will likely feel improved also, advised weight loss and exercise.            Plan:       Amanda was seen today for anemia.    Diagnoses and all orders for this visit:    Iron deficiency anemia, unspecified iron deficiency anemia type  -     CBC auto differential; Standing  -     Comprehensive metabolic panel; Future  -     Ferritin; Standing  -     Iron and TIBC; Standing    Type 2 diabetes mellitus without complication, with long-term current use of insulin    History of non-ST elevation myocardial infarction (NSTEMI)            PLAN:    MD vis in 4-5 mo with lab cbc, ferritin, cmp, iron profile  We will monitor to see if iron  deficiency recurs after replacement next week    Exercise/nutrition    Important to keep follow-up with PCP and GI    Discussed plan above in great detail with patient and all questions answered to their satisfaction. Proceed with plan above.       Thank you for the referral. Please call with any questions.           Bebe Spear M.D.  Hematology Oncology  [facility]

## 2019-12-19 NOTE — LETTER
December 19, 2019      No Recipients           [facility] [place] [id number] [address]  Phone: [phone]  Fax: [phone]          Patient: Amanda Barton   MR Number: [id number]   YOB: 1960   Date of Visit: 12/19/2019       Dear :    Thank you for referring Amanda Barton to me for evaluation. Attached you will find relevant portions of my assessment and plan of care.    If you have questions, please do not hesitate to call me. I look forward to following Amanda Barton along with you.    Sincerely,    Bebe Spear MD    Enclosure  CC:  No Recipients    If you would like to receive this communication electronically, please contact [email or web address] or [phone] to request more information on [facility] Link access.    For providers and/or their staff who would like to refer a patient to [facility], please contact us through our one-stop-shop provider referral line, [facility], at [phone].    If you feel you have received this communication in error or would no longer like to receive these types of communications, please e-mail [email or web address]

## 2022-03-31 ENCOUNTER — DOCUMENT SCAN (OUTPATIENT)
Dept: HOME HEALTH SERVICES | Facility: HOSPITAL | Age: 62
End: 2022-03-31
Payer: MEDICARE

## 2022-07-23 ENCOUNTER — DOCUMENT SCAN (OUTPATIENT)
Dept: HOME HEALTH SERVICES | Facility: HOSPITAL | Age: 62
End: 2022-07-23
Payer: MEDICARE

## 2022-09-26 ENCOUNTER — OFFICE VISIT (OUTPATIENT)
Dept: ORTHOPEDICS | Facility: CLINIC | Age: 62
End: 2022-09-26
Payer: MEDICARE

## 2022-09-26 ENCOUNTER — HOSPITAL ENCOUNTER (OUTPATIENT)
Dept: RADIOLOGY | Facility: HOSPITAL | Age: 62
Discharge: HOME | End: 2022-09-26
Attending: ORTHOPAEDIC SURGERY
Payer: MEDICARE

## 2022-09-26 DIAGNOSIS — M25.571 RIGHT ANKLE PAIN, UNSPECIFIED CHRONICITY: Primary | ICD-10-CM

## 2022-09-26 DIAGNOSIS — M25.571 RIGHT ANKLE PAIN, UNSPECIFIED CHRONICITY: ICD-10-CM

## 2022-09-26 DIAGNOSIS — S82.831A CLOSED FRACTURE OF DISTAL END OF RIGHT FIBULA, UNSPECIFIED FRACTURE MORPHOLOGY, INITIAL ENCOUNTER: Primary | ICD-10-CM

## 2022-09-26 PROCEDURE — 1160F RVW MEDS BY RX/DR IN RCRD: CPT | Mod: CPTII,S$GLB,, | Performed by: ORTHOPAEDIC SURGERY

## 2022-09-26 PROCEDURE — 27786 TREATMENT OF ANKLE FRACTURE: CPT | Mod: RT,S$GLB,, | Performed by: ORTHOPAEDIC SURGERY

## 2022-09-26 PROCEDURE — 99999 PR PBB SHADOW E&M-EST. PATIENT-LVL I: ICD-10-PCS | Mod: PBBFAC,,, | Performed by: ORTHOPAEDIC SURGERY

## 2022-09-26 PROCEDURE — 1159F MED LIST DOCD IN RCRD: CPT | Mod: CPTII,S$GLB,, | Performed by: ORTHOPAEDIC SURGERY

## 2022-09-26 PROCEDURE — 99204 PR OFFICE/OUTPT VISIT, NEW, LEVL IV, 45-59 MIN: ICD-10-PCS | Mod: 57,S$GLB,, | Performed by: ORTHOPAEDIC SURGERY

## 2022-09-26 PROCEDURE — 27786 PR CLOSED RX DIST FIBULA FX: ICD-10-PCS | Mod: RT,S$GLB,, | Performed by: ORTHOPAEDIC SURGERY

## 2022-09-26 PROCEDURE — 73610 X-RAY EXAM OF ANKLE: CPT | Mod: 26,RT,, | Performed by: RADIOLOGY

## 2022-09-26 PROCEDURE — 4010F ACE/ARB THERAPY RXD/TAKEN: CPT | Mod: CPTII,S$GLB,, | Performed by: ORTHOPAEDIC SURGERY

## 2022-09-26 PROCEDURE — 99204 OFFICE O/P NEW MOD 45 MIN: CPT | Mod: 57,S$GLB,, | Performed by: ORTHOPAEDIC SURGERY

## 2022-09-26 PROCEDURE — 1159F PR MEDICATION LIST DOCUMENTED IN MEDICAL RECORD: ICD-10-PCS | Mod: CPTII,S$GLB,, | Performed by: ORTHOPAEDIC SURGERY

## 2022-09-26 PROCEDURE — 1160F PR REVIEW ALL MEDS BY PRESCRIBER/CLIN PHARMACIST DOCUMENTED: ICD-10-PCS | Mod: CPTII,S$GLB,, | Performed by: ORTHOPAEDIC SURGERY

## 2022-09-26 PROCEDURE — 99999 PR PBB SHADOW E&M-EST. PATIENT-LVL I: CPT | Mod: PBBFAC,,, | Performed by: ORTHOPAEDIC SURGERY

## 2022-09-26 PROCEDURE — 73610 XR ANKLE COMPLETE 3 VIEW RIGHT: ICD-10-PCS | Mod: 26,RT,, | Performed by: RADIOLOGY

## 2022-09-26 PROCEDURE — 73610 X-RAY EXAM OF ANKLE: CPT | Mod: TC,PO,RT

## 2022-09-26 PROCEDURE — 4010F PR ACE/ARB THEARPY RXD/TAKEN: ICD-10-PCS | Mod: CPTII,S$GLB,, | Performed by: ORTHOPAEDIC SURGERY

## 2022-09-26 RX ORDER — OXYCODONE AND ACETAMINOPHEN 5; 325 MG/1; MG/1
1 TABLET ORAL
Qty: 27 TABLET | Refills: 0 | Status: SHIPPED | OUTPATIENT
Start: 2022-09-26

## 2022-09-26 NOTE — PROGRESS NOTES
62 years old inversion injury to the right ankle with 3 days ago been hurting since then went outlying facility got splint comes today follow-up.  Pain is 9 on the pain scale     Exam shows tenderness and swelling throughout the ankle Achilles tendon intact no signs infection     X-rays show distal fibular fracture with acceptable position    Assessment: Right distal fibular fracture Charles B     Plan: Boot, okay for weight-bearing to tolerance, follow-up in a couple weeks time is a postoperative visit with repeat x-rays of her right ankle     We performed a custom orthotic/brace fitting, adjusting and training with the patient. The patient demonstrated understanding and proper care. This was performed for 15 minutes.   Imaging studies ordered and reviewed by me    Further History  Aching pain  Worse with activity  Relieved with rest  No other associated symptoms  No other radiation    Further Exam  Alert and oriented  Pleasant  Contralateral limb has appropriate range of motion for age and condition  Contralateral limb has appropriate strength for age and condition  Contralateral limb has appropriate stability  for age and condition  No adenopathy  Pulses are appropriate for current condition  Skin is intact        Chief Complaint    No chief complaint on file.      PAMELA  Amanda Barton is a 62 y.o.  female who presents with       Past Medical History  Past Medical History:   Diagnosis Date    Acute bacterial pericarditis 2017    Anemia     Bacteremia due to methicillin resistant Staphylococcus aureus 2017    Diabetes mellitus     Encounter for blood transfusion     GERD (gastroesophageal reflux disease)     Heart murmur     Hypertension     Migraine headache     Type 2 diabetes mellitus without complication, with long-term current use of insulin 2017       Past Surgical History  Past Surgical History:   Procedure Laterality Date    APPENDECTOMY       SECTION      CHOLECYSTECTOMY       COLONOSCOPY N/A 12/13/2017    Procedure: COLONOSCOPY;  Surgeon: Juan Lepe MD;  Location: [facility];  Service: Endoscopy;  Laterality: N/A;    ESOPHAGOGASTRODUODENOSCOPY N/A 11/21/2019    Procedure: EGD (ESOPHAGOGASTRODUODENOSCOPY);  Surgeon: Gustavo Austin MD;  Location: [facility];  Service: Endoscopy;  Laterality: N/A;  with Push Enteroscopy    ESOPHAGOGASTRODUODENOSCOPY N/A 11/22/2019    Procedure: EGD (ESOPHAGOGASTRODUODENOSCOPY);  Surgeon: Gustavo Austin MD;  Location: [facility];  Service: Endoscopy;  Laterality: N/A;  Push enteroscopy    HYSTERECTOMY         Medications  Current Outpatient Medications   Medication Sig    ALPRAZolam (XANAX) 0.5 MG tablet Take 0.5 mg by mouth 3 (three) times daily.    blood sugar diagnostic Strp     dulaglutide (TRULICITY SUBQ) Inject into the skin.    DULoxetine (CYMBALTA) 60 MG capsule Take 60 mg by mouth once daily.     furosemide (LASIX) 20 MG tablet Take 1 tablet (20 mg total) by mouth once daily.    gabapentin (NEURONTIN) 800 MG tablet Take 800 mg by mouth 3 (three) times daily.     insulin lispro (HUMALOG KWIKPEN INSULIN) 100 unit/mL pen Inject 25 Units into the skin 3 (three) times daily with meals.    losartan (COZAAR) 50 MG tablet Take 50 mg by mouth once daily.    metFORMIN (GLUCOPHAGE) 1000 MG tablet Take 1 tablet by mouth 2 (two) times daily.    oxyCODONE-acetaminophen (PERCOCET) 5-325 mg per tablet Take 1 tablet by mouth every 4 to 6 hours as needed for Pain.    pregabalin (LYRICA) 150 MG capsule Take 150 mg by mouth 3 (three) times daily.    rOPINIRole (REQUIP) 2 MG tablet Take 2 mg by mouth 2 (two) times daily.    temazepam (RESTORIL) 30 mg capsule Take 30 mg by mouth nightly as needed for Insomnia.     No current facility-administered medications for this visit.       Allergies  Review of patient's allergies indicates:   Allergen Reactions    [place] stimulating factors        Family History  Family History   Problem Relation Age of Onset    No  Known Problems Mother     Heart disease Father     Heart failure Father     Arrhythmia Father        Social History  Social History     Socioeconomic History    Marital status:    Tobacco Use    Smoking status: Never    Smokeless tobacco: Never   Substance and Sexual Activity    Alcohol use: No               Review of Systems     Constitutional: Negative    HENT: Negative  Eyes: Negative  Respiratory: Negative  Cardiovascular: Negative  Musculoskeletal: HPI  Skin: Negative  Neurological: Negative  Hematological: Negative  Endocrine: Negative                 Physical Exam    There were no vitals filed for this visit.  There is no height or weight on file to calculate BMI.  Physical Examination:     General appearance -  well appearing, and in no distress  Mental status - awake  Neck - supple  Chest -  symmetric air entry  Heart - normal rate   Abdomen - soft      Assessment   No diagnosis found.      Plan

## 2022-10-14 DIAGNOSIS — S82.831A CLOSED FRACTURE OF DISTAL END OF RIGHT FIBULA, UNSPECIFIED FRACTURE MORPHOLOGY, INITIAL ENCOUNTER: Primary | ICD-10-CM

## 2022-10-17 ENCOUNTER — HOSPITAL ENCOUNTER (OUTPATIENT)
Dept: RADIOLOGY | Facility: HOSPITAL | Age: 62
Discharge: HOME | End: 2022-10-17
Attending: ORTHOPAEDIC SURGERY
Payer: MEDICARE

## 2022-10-17 ENCOUNTER — OFFICE VISIT (OUTPATIENT)
Dept: ORTHOPEDICS | Facility: CLINIC | Age: 62
End: 2022-10-17
Payer: MEDICARE

## 2022-10-17 VITALS — WEIGHT: 293 LBS | BODY MASS INDEX: 51.91 KG/M2 | HEIGHT: 63 IN

## 2022-10-17 DIAGNOSIS — S82.831A CLOSED FRACTURE OF DISTAL END OF RIGHT FIBULA, UNSPECIFIED FRACTURE MORPHOLOGY, INITIAL ENCOUNTER: ICD-10-CM

## 2022-10-17 DIAGNOSIS — M25.571 RIGHT ANKLE PAIN, UNSPECIFIED CHRONICITY: ICD-10-CM

## 2022-10-17 DIAGNOSIS — S82.831A CLOSED FRACTURE OF DISTAL END OF RIGHT FIBULA, UNSPECIFIED FRACTURE MORPHOLOGY, INITIAL ENCOUNTER: Primary | ICD-10-CM

## 2022-10-17 PROCEDURE — 1159F MED LIST DOCD IN RCRD: CPT | Mod: CPTII,S$GLB,, | Performed by: ORTHOPAEDIC SURGERY

## 2022-10-17 PROCEDURE — 99999 PR PBB SHADOW E&M-EST. PATIENT-LVL III: ICD-10-PCS | Mod: PBBFAC,,, | Performed by: ORTHOPAEDIC SURGERY

## 2022-10-17 PROCEDURE — 73610 XR ANKLE COMPLETE 3 VIEW RIGHT: ICD-10-PCS | Mod: 26,RT,, | Performed by: RADIOLOGY

## 2022-10-17 PROCEDURE — 4010F ACE/ARB THERAPY RXD/TAKEN: CPT | Mod: CPTII,S$GLB,, | Performed by: ORTHOPAEDIC SURGERY

## 2022-10-17 PROCEDURE — 1159F PR MEDICATION LIST DOCUMENTED IN MEDICAL RECORD: ICD-10-PCS | Mod: CPTII,S$GLB,, | Performed by: ORTHOPAEDIC SURGERY

## 2022-10-17 PROCEDURE — 99999 PR PBB SHADOW E&M-EST. PATIENT-LVL III: CPT | Mod: PBBFAC,,, | Performed by: ORTHOPAEDIC SURGERY

## 2022-10-17 PROCEDURE — 73610 X-RAY EXAM OF ANKLE: CPT | Mod: TC,PO,RT

## 2022-10-17 PROCEDURE — 99024 POSTOP FOLLOW-UP VISIT: CPT | Mod: S$GLB,,, | Performed by: ORTHOPAEDIC SURGERY

## 2022-10-17 PROCEDURE — 99024 PR POST-OP FOLLOW-UP VISIT: ICD-10-PCS | Mod: S$GLB,,, | Performed by: ORTHOPAEDIC SURGERY

## 2022-10-17 PROCEDURE — 4010F PR ACE/ARB THEARPY RXD/TAKEN: ICD-10-PCS | Mod: CPTII,S$GLB,, | Performed by: ORTHOPAEDIC SURGERY

## 2022-10-17 PROCEDURE — 73610 X-RAY EXAM OF ANKLE: CPT | Mod: 26,RT,, | Performed by: RADIOLOGY

## 2022-10-17 NOTE — PROGRESS NOTES
Close 1 month out from distal fibula fracture and is doing better reports minimal pain    Exam shows she is somewhat tender distal fibula nontender medially skin is intact compartments soft     X-rays show good position persistent lucency noted     Assessment: Healing distal fibula fracture     Plan:  Continue boot, continue with range of motion exercises, continue with activity modifications, follow-up in about 6 weeks time is a postoperative visit with x-rays of her ankle

## 2022-11-25 DIAGNOSIS — S82.831A CLOSED FRACTURE OF DISTAL END OF RIGHT FIBULA, UNSPECIFIED FRACTURE MORPHOLOGY, INITIAL ENCOUNTER: Primary | ICD-10-CM

## 2023-01-25 LAB — CRC RECOMMENDATION EXT: NORMAL

## 2023-02-13 ENCOUNTER — TELEPHONE (OUTPATIENT)
Dept: FAMILY MEDICINE | Facility: CLINIC | Age: 63
End: 2023-02-13
Payer: MEDICARE

## 2023-02-13 NOTE — TELEPHONE ENCOUNTER
----- Message from Jim Duong sent at 2/13/2023 10:56 AM CST -----  Caller is requesting a same day appointment.  Caller declined first available appointment listed below.           Name of Caller: Amanda          When is the first available appointment? March 1         Symptoms: bad cough for 6 weeks, sick, est care         Best Call Back Number: [phone]           Additional Information:

## 2023-02-15 ENCOUNTER — OFFICE VISIT (OUTPATIENT)
Dept: FAMILY MEDICINE | Facility: CLINIC | Age: 63
End: 2023-02-15
Payer: MEDICARE

## 2023-02-15 VITALS
DIASTOLIC BLOOD PRESSURE: 79 MMHG | HEART RATE: 91 BPM | TEMPERATURE: 98 F | OXYGEN SATURATION: 95 % | SYSTOLIC BLOOD PRESSURE: 132 MMHG | BODY MASS INDEX: 37.21 KG/M2 | WEIGHT: 210 LBS | RESPIRATION RATE: 18 BRPM

## 2023-02-15 DIAGNOSIS — R05.9 COUGH, UNSPECIFIED TYPE: ICD-10-CM

## 2023-02-15 DIAGNOSIS — I50.32 CHRONIC DIASTOLIC HEART FAILURE: ICD-10-CM

## 2023-02-15 DIAGNOSIS — E11.9 TYPE 2 DIABETES MELLITUS WITHOUT COMPLICATION, WITH LONG-TERM CURRENT USE OF INSULIN: Primary | ICD-10-CM

## 2023-02-15 DIAGNOSIS — Z79.4 TYPE 2 DIABETES MELLITUS WITHOUT COMPLICATION, WITH LONG-TERM CURRENT USE OF INSULIN: Primary | ICD-10-CM

## 2023-02-15 PROCEDURE — 99203 OFFICE O/P NEW LOW 30 MIN: CPT | Mod: S$GLB,,, | Performed by: PHYSICIAN ASSISTANT

## 2023-02-15 PROCEDURE — 1160F PR REVIEW ALL MEDS BY PRESCRIBER/CLIN PHARMACIST DOCUMENTED: ICD-10-PCS | Mod: CPTII,S$GLB,, | Performed by: PHYSICIAN ASSISTANT

## 2023-02-15 PROCEDURE — 3078F PR MOST RECENT DIASTOLIC BLOOD PRESSURE < 80 MM HG: ICD-10-PCS | Mod: CPTII,S$GLB,, | Performed by: PHYSICIAN ASSISTANT

## 2023-02-15 PROCEDURE — 3078F DIAST BP <80 MM HG: CPT | Mod: CPTII,S$GLB,, | Performed by: PHYSICIAN ASSISTANT

## 2023-02-15 PROCEDURE — 1159F PR MEDICATION LIST DOCUMENTED IN MEDICAL RECORD: ICD-10-PCS | Mod: CPTII,S$GLB,, | Performed by: PHYSICIAN ASSISTANT

## 2023-02-15 PROCEDURE — 3008F BODY MASS INDEX DOCD: CPT | Mod: CPTII,S$GLB,, | Performed by: PHYSICIAN ASSISTANT

## 2023-02-15 PROCEDURE — 1159F MED LIST DOCD IN RCRD: CPT | Mod: CPTII,S$GLB,, | Performed by: PHYSICIAN ASSISTANT

## 2023-02-15 PROCEDURE — 3008F PR BODY MASS INDEX (BMI) DOCUMENTED: ICD-10-PCS | Mod: CPTII,S$GLB,, | Performed by: PHYSICIAN ASSISTANT

## 2023-02-15 PROCEDURE — 4010F PR ACE/ARB THEARPY RXD/TAKEN: ICD-10-PCS | Mod: CPTII,S$GLB,, | Performed by: PHYSICIAN ASSISTANT

## 2023-02-15 PROCEDURE — 99203 PR OFFICE/OUTPT VISIT, NEW, LEVL III, 30-44 MIN: ICD-10-PCS | Mod: S$GLB,,, | Performed by: PHYSICIAN ASSISTANT

## 2023-02-15 PROCEDURE — 4010F ACE/ARB THERAPY RXD/TAKEN: CPT | Mod: CPTII,S$GLB,, | Performed by: PHYSICIAN ASSISTANT

## 2023-02-15 PROCEDURE — 3075F SYST BP GE 130 - 139MM HG: CPT | Mod: CPTII,S$GLB,, | Performed by: PHYSICIAN ASSISTANT

## 2023-02-15 PROCEDURE — 82570 ASSAY OF URINE CREATININE: CPT | Performed by: PHYSICIAN ASSISTANT

## 2023-02-15 PROCEDURE — 3075F PR MOST RECENT SYSTOLIC BLOOD PRESS GE 130-139MM HG: ICD-10-PCS | Mod: CPTII,S$GLB,, | Performed by: PHYSICIAN ASSISTANT

## 2023-02-15 PROCEDURE — 1160F RVW MEDS BY RX/DR IN RCRD: CPT | Mod: CPTII,S$GLB,, | Performed by: PHYSICIAN ASSISTANT

## 2023-02-15 RX ORDER — ROSUVASTATIN CALCIUM 20 MG/1
20 TABLET, COATED ORAL NIGHTLY
COMMUNITY
End: 2023-10-02 | Stop reason: SDUPTHER

## 2023-02-15 RX ORDER — SPIRONOLACTONE 50 MG/1
50 TABLET, FILM COATED ORAL DAILY
COMMUNITY
End: 2023-07-11 | Stop reason: SDUPTHER

## 2023-02-15 RX ORDER — DOXEPIN HYDROCHLORIDE 50 MG/1
50 CAPSULE ORAL NIGHTLY
COMMUNITY
End: 2023-03-14

## 2023-02-15 RX ORDER — CARVEDILOL 3.12 MG/1
3.12 TABLET ORAL 2 TIMES DAILY WITH MEALS
COMMUNITY
End: 2023-10-02 | Stop reason: SDUPTHER

## 2023-02-15 RX ORDER — INSULIN GLARGINE 100 [IU]/ML
30 INJECTION, SOLUTION SUBCUTANEOUS NIGHTLY
Qty: 1 EACH | Refills: 5 | Status: SHIPPED | OUTPATIENT
Start: 2023-02-15 | End: 2023-04-03

## 2023-02-15 RX ORDER — BUDESONIDE AND FORMOTEROL FUMARATE DIHYDRATE 80; 4.5 UG/1; UG/1
2 AEROSOL RESPIRATORY (INHALATION) 2 TIMES DAILY
Qty: 10.2 G | Refills: 0 | Status: SHIPPED | OUTPATIENT
Start: 2023-02-15 | End: 2023-03-19

## 2023-02-15 RX ORDER — DICLOFENAC SODIUM 50 MG/1
50 TABLET, DELAYED RELEASE ORAL DAILY
COMMUNITY
End: 2023-02-23

## 2023-02-15 RX ORDER — MONTELUKAST SODIUM 10 MG/1
10 TABLET ORAL NIGHTLY
Qty: 30 TABLET | Refills: 0 | Status: SHIPPED | OUTPATIENT
Start: 2023-02-15 | End: 2023-03-14

## 2023-02-15 RX ORDER — PROMETHAZINE HYDROCHLORIDE 25 MG/1
25 TABLET ORAL
COMMUNITY
End: 2023-10-02 | Stop reason: SDUPTHER

## 2023-02-15 RX ORDER — IBUPROFEN 800 MG/1
800 TABLET ORAL 3 TIMES DAILY PRN
COMMUNITY
End: 2024-03-06 | Stop reason: SDUPTHER

## 2023-02-15 RX ORDER — TIZANIDINE 4 MG/1
4 TABLET ORAL 3 TIMES DAILY
COMMUNITY
End: 2023-10-02 | Stop reason: SDUPTHER

## 2023-02-15 RX ORDER — PROMETHAZINE HYDROCHLORIDE AND DEXTROMETHORPHAN HYDROBROMIDE 6.25; 15 MG/5ML; MG/5ML
5 SYRUP ORAL 3 TIMES DAILY PRN
Qty: 240 ML | Refills: 0 | Status: SHIPPED | OUTPATIENT
Start: 2023-02-15 | End: 2023-02-17 | Stop reason: CLARIF

## 2023-02-15 RX ORDER — ERGOCALCIFEROL 1.25 MG/1
50000 CAPSULE ORAL
COMMUNITY
End: 2023-10-02 | Stop reason: SDUPTHER

## 2023-02-15 RX ORDER — DULAGLUTIDE 0.75 MG/.5ML
0.75 INJECTION, SOLUTION SUBCUTANEOUS
Qty: 4 PEN | Refills: 11 | Status: SHIPPED | OUTPATIENT
Start: 2023-02-15 | End: 2023-04-03

## 2023-02-15 NOTE — PROGRESS NOTES
Patient ID: Amanda Barton is a 62 y.o. female.     Chief Complaint: Establish Care     61yo female with hx of uncontrolled DM2 with use of insulin and HTN presents complaining of a chronic cough X 8 weeks and wanting to establish care. Pt's daughter was in room with her and confirmed that pt went to the ER last week due to chronic cough. Pt says they ran multiple tests (COVID, flu, strep) with no positive results. Pt says they performed an Xray and a CT scan with no abnormal findings. Pt says she was given a Z-pack and sent home. Pt says symptoms have not improved since. Pt confirms use of OTC robitussin, tessalon pearls, Mucinex with no relief of symptoms. Pt describes the cough as constant and dry. No post-nasal drip or production of mucus. Pt denies beginning any new medications. Pt denies ever having smoked. Pt has no hx of asthma or COVID.      Pt also wants to establish care at this clinic. Could not address all issues today due to time limitations. Agreed to focus on diabetes medications and cough today. Pt has no current labwork on file. Future orders were placed today to obtain labs. Pt says her sugar levels have been high at home (~400-500) and that her prescription for her insulin and diabetic medications needs to be addressed. Pt says her previous doctor discontinued multiple of her medications but is unsure why. Pt complained of recent unintended weight gain.     Review of Systems   Constitutional: Negative.    HENT:  Negative for nasal congestion, postnasal drip, rhinorrhea, sinus pressure/congestion, sneezing and sore throat.    Eyes: Negative.    Respiratory:  Positive for cough and shortness of breath.    Cardiovascular: Negative.    Neurological: Negative.             Past Medical History:   Diagnosis Date    Acute bacterial pericarditis 12/2/2017    Anemia      Bacteremia due to methicillin resistant Staphylococcus aureus 11/20/2017    Diabetes mellitus      Encounter for blood transfusion       GERD (gastroesophageal reflux disease)      Heart murmur      Hypertension      Migraine headache      Type 2 diabetes mellitus without complication, with long-term current use of insulin 11/19/2017          Patient Active Problem List   Diagnosis    Type 2 diabetes mellitus without complication, with long-term current use of insulin    History of non-ST elevation myocardial infarction (NSTEMI)    BJ (obstructive sleep apnea)    Bacteremia due to methicillin resistant Staphylococcus aureus    Chronic diastolic heart failure    Diastolic dysfunction    Paroxysmal atrial fibrillation    Pericarditis    Cough    Sepsis    Febrile illness    Iron deficiency anemia    Troponin level elevated    Obesity    Chronic anticoagulation    Symptomatic anemia    Constipation    GI bleed    Diabetes mellitus      Objective:   Physical Exam  Vitals reviewed.   Constitutional:       General: She is not in acute distress.  HENT:      Head: Normocephalic and atraumatic.   Cardiovascular:      Rate and Rhythm: Normal rate and regular rhythm.      Pulses:           Dorsalis pedis pulses are 2+ on the right side and 2+ on the left side.        Posterior tibial pulses are 2+ on the right side and 2+ on the left side.      Heart sounds: No murmur heard.    No friction rub. No gallop.      Comments: Heart sounds distant  Pulmonary:      Effort: Pulmonary effort is normal.      Breath sounds: Normal breath sounds. No wheezing, rhonchi or rales.      Comments: Pt coughed on each deep inhale  Feet:      Right foot:      Protective Sensation: 9 sites tested.  9 sites sensed.      Skin integrity: Skin integrity normal.      Toenail Condition: Right toenails are normal.      Left foot:      Protective Sensation: 9 sites tested.  9 sites sensed.      Skin integrity: Skin integrity normal.      Toenail Condition: Left toenails are normal.   Neurological:      Mental Status: She is alert.       Assessment:       Problem List Items Addressed This  Visit                  Pulmonary     Cough     Relevant Medications     budesonide-formoterol 80-4.5 mcg (SYMBICORT) 80-4.5 mcg/actuation HFAA     montelukast (SINGULAIR) 10 mg tablet     promethazine-dextromethorphan (PROMETHAZINE-DM) 6.25-15 mg/5 mL Syrp     Other Relevant Orders     CBC Auto Differential          Cardiac/Vascular     Chronic diastolic heart failure     Relevant Orders     B-TYPE NATRIURETIC PEPTIDE          Endocrine     Type 2 diabetes mellitus without complication, with long-term current use of insulin - Primary     Relevant Medications     insulin (LANTUS SOLOSTAR U-100 INSULIN) glargine 100 units/mL SubQ pen     dulaglutide (TRULICITY) 0.75 mg/0.5 mL pen injector     Other Relevant Orders     Comprehensive Metabolic Panel     Lipid Panel     T4, Free     TSH     Hemoglobin A1C     MICROALBUMIN / CREATININE RATIO URINE      Plan:         Type 2 diabetes mellitus without complication, with long-term current use of insulin  -     insulin (LANTUS SOLOSTAR U-100 INSULIN) glargine 100 units/mL SubQ pen; Inject 30 Units into the skin every evening.  Dispense: 1 each; Refill: 5  -     dulaglutide (TRULICITY) 0.75 mg/0.5 mL pen injector; Inject 0.75 mg into the skin every 7 days.  Dispense: 4 pen; Refill: 11  -     Comprehensive Metabolic Panel; Future; Expected date: 02/15/2023  -     Lipid Panel; Future; Expected date: 02/15/2023  -     T4, Free; Future; Expected date: 02/15/2023  -     TSH; Future; Expected date: 02/15/2023  -     Hemoglobin A1C; Future; Expected date: 02/15/2023  -     MICROALBUMIN / CREATININE RATIO URINE     Chronic diastolic heart failure  -     B-TYPE NATRIURETIC PEPTIDE; Future; Expected date: 02/15/2023     Cough, unspecified type  -     budesonide-formoterol 80-4.5 mcg (SYMBICORT) 80-4.5 mcg/actuation HFAA; Inhale 2 puffs into the lungs 2 (two) times a day. Controller  Dispense: 10.2 g; Refill: 0  -     montelukast (SINGULAIR) 10 mg tablet; Take 1 tablet (10 mg total) by  mouth every evening.  Dispense: 30 tablet; Refill: 0  -     promethazine-dextromethorphan (PROMETHAZINE-DM) 6.25-15 mg/5 mL Syrp; Take 5 mLs by mouth 3 (three) times daily as needed.  Dispense: 240 mL; Refill: 0  -     CBC Auto Differential; Future; Expected date: 02/15/2023     --Diabetic microfilament foot exam performed today in clinic without complications.  --Pt to f/u in 1 week for discussion of labs and est care     I spent 30 minutes on this encounter including face-to-face encounter, chart review, documentation, test review, and orders.

## 2023-02-15 NOTE — MEDICAL/APP STUDENT
Subjective:       Patient ID: Amanda Barton is a 62 y.o. female.    Chief Complaint: Establish Care    61yo female with hx of uncontrolled DM2 with use of insulin and HTN presents complaining of a chronic cough X 8 weeks and wanting to establish care. Pt's daughter was in room with her and confirmed that pt went to the ER last week due to chronic cough. Pt says they ran multiple tests (COVID, flu, strep) with no positive results. Pt says they performed an Xray and a CT scan with no abnormal findings. Pt says she was given a Z-pack and sent home. Pt says symptoms have not improved since. Pt confirms use of OTC robitussin, tessalon pearls, Mucinex with no relief of symptoms. Pt describes the cough as constant and dry. No post-nasal drip or production of mucus. Pt denies beginning any new medications. Pt denies ever having smoked. Pt has no hx of asthma or COVID.     Pt also wants to establish care at this clinic. Could not address all issues today due to time limitations. Agreed to focus on diabetes medications and cough today. Pt has no current labwork on file. Future orders were placed today to obtain labs. Pt says her sugar levels have been high at home (~400-500) and that her prescription for her insulin and diabetic medications needs to be addressed. Pt says her previous doctor discontinued multiple of her medications but is unsure why. Pt complained of recent unintended weight gain.    Review of Systems   Constitutional: Negative.    HENT:  Negative for nasal congestion, postnasal drip, rhinorrhea, sinus pressure/congestion, sneezing and sore throat.    Eyes: Negative.    Respiratory:  Positive for cough and shortness of breath.    Cardiovascular: Negative.    Neurological: Negative.        Past Medical History:   Diagnosis Date    Acute bacterial pericarditis 12/2/2017    Anemia     Bacteremia due to methicillin resistant Staphylococcus aureus 11/20/2017    Diabetes mellitus     Encounter for blood  transfusion     GERD (gastroesophageal reflux disease)     Heart murmur     Hypertension     Migraine headache     Type 2 diabetes mellitus without complication, with long-term current use of insulin 11/19/2017      Patient Active Problem List   Diagnosis    Type 2 diabetes mellitus without complication, with long-term current use of insulin    History of non-ST elevation myocardial infarction (NSTEMI)    BJ (obstructive sleep apnea)    Bacteremia due to methicillin resistant Staphylococcus aureus    Chronic diastolic heart failure    Diastolic dysfunction    Paroxysmal atrial fibrillation    Pericarditis    Cough    Sepsis    Febrile illness    Iron deficiency anemia    Troponin level elevated    Obesity    Chronic anticoagulation    Symptomatic anemia    Constipation    GI bleed    Diabetes mellitus      Objective:      Physical Exam  Vitals reviewed.   Constitutional:       General: She is not in acute distress.  HENT:      Head: Normocephalic and atraumatic.   Cardiovascular:      Rate and Rhythm: Normal rate and regular rhythm.      Pulses:           Dorsalis pedis pulses are 2+ on the right side and 2+ on the left side.        Posterior tibial pulses are 2+ on the right side and 2+ on the left side.      Heart sounds: No murmur heard.    No friction rub. No gallop.      Comments: Heart sounds distant  Pulmonary:      Effort: Pulmonary effort is normal.      Breath sounds: Normal breath sounds. No wheezing, rhonchi or rales.      Comments: Pt coughed on each deep inhale  Feet:      Right foot:      Protective Sensation: 9 sites tested.  9 sites sensed.      Skin integrity: Skin integrity normal.      Toenail Condition: Right toenails are normal.      Left foot:      Protective Sensation: 9 sites tested.  9 sites sensed.      Skin integrity: Skin integrity normal.      Toenail Condition: Left toenails are normal.   Neurological:      Mental Status: She is alert.       Assessment:       Problem List Items  Addressed This Visit          Pulmonary    Cough    Relevant Medications    budesonide-formoterol 80-4.5 mcg (SYMBICORT) 80-4.5 mcg/actuation HFAA    montelukast (SINGULAIR) 10 mg tablet    promethazine-dextromethorphan (PROMETHAZINE-DM) 6.25-15 mg/5 mL Syrp    Other Relevant Orders    CBC Auto Differential       Cardiac/Vascular    Chronic diastolic heart failure    Relevant Orders    B-TYPE NATRIURETIC PEPTIDE       Endocrine    Type 2 diabetes mellitus without complication, with long-term current use of insulin - Primary    Relevant Medications    insulin (LANTUS SOLOSTAR U-100 INSULIN) glargine 100 units/mL SubQ pen    dulaglutide (TRULICITY) 0.75 mg/0.5 mL pen injector    Other Relevant Orders    Comprehensive Metabolic Panel    Lipid Panel    T4, Free    TSH    Hemoglobin A1C    MICROALBUMIN / CREATININE RATIO URINE         Plan:         Type 2 diabetes mellitus without complication, with long-term current use of insulin  -     insulin (LANTUS SOLOSTAR U-100 INSULIN) glargine 100 units/mL SubQ pen; Inject 30 Units into the skin every evening.  Dispense: 1 each; Refill: 5  -     dulaglutide (TRULICITY) 0.75 mg/0.5 mL pen injector; Inject 0.75 mg into the skin every 7 days.  Dispense: 4 pen; Refill: 11  -     Comprehensive Metabolic Panel; Future; Expected date: 02/15/2023  -     Lipid Panel; Future; Expected date: 02/15/2023  -     T4, Free; Future; Expected date: 02/15/2023  -     TSH; Future; Expected date: 02/15/2023  -     Hemoglobin A1C; Future; Expected date: 02/15/2023  -     MICROALBUMIN / CREATININE RATIO URINE    Chronic diastolic heart failure  -     B-TYPE NATRIURETIC PEPTIDE; Future; Expected date: 02/15/2023    Cough, unspecified type  -     budesonide-formoterol 80-4.5 mcg (SYMBICORT) 80-4.5 mcg/actuation HFAA; Inhale 2 puffs into the lungs 2 (two) times a day. Controller  Dispense: 10.2 g; Refill: 0  -     montelukast (SINGULAIR) 10 mg tablet; Take 1 tablet (10 mg total) by mouth every evening.   Dispense: 30 tablet; Refill: 0  -     promethazine-dextromethorphan (PROMETHAZINE-DM) 6.25-15 mg/5 mL Syrp; Take 5 mLs by mouth 3 (three) times daily as needed.  Dispense: 240 mL; Refill: 0  -     CBC Auto Differential; Future; Expected date: 02/15/2023    --Diabetic microfilament foot exam performed today in clinic without complications.  --Pt to f/u in 1 week for discussion of labs and est care    I spent 30 minutes on this encounter including face-to-face encounter, chart review, documentation, test review, and orders.

## 2023-02-16 ENCOUNTER — LAB VISIT (OUTPATIENT)
Dept: FAMILY MEDICINE | Facility: CLINIC | Age: 63
End: 2023-02-16
Payer: MEDICARE

## 2023-02-16 DIAGNOSIS — I50.32 CHRONIC DIASTOLIC HEART FAILURE: ICD-10-CM

## 2023-02-16 DIAGNOSIS — R05.9 COUGH, UNSPECIFIED TYPE: ICD-10-CM

## 2023-02-16 DIAGNOSIS — E11.9 TYPE 2 DIABETES MELLITUS WITHOUT COMPLICATION, WITH LONG-TERM CURRENT USE OF INSULIN: ICD-10-CM

## 2023-02-16 DIAGNOSIS — Z79.4 TYPE 2 DIABETES MELLITUS WITHOUT COMPLICATION, WITH LONG-TERM CURRENT USE OF INSULIN: ICD-10-CM

## 2023-02-16 LAB
ALBUMIN/CREAT UR: 94.1 UG/MG (ref 0–30)
BASOPHILS # BLD AUTO: 0.05 K/UL (ref 0–0.2)
BASOPHILS NFR BLD: 0.8 % (ref 0–1.9)
BNP SERPL-MCNC: 323 PG/ML (ref 0–99)
CHOLEST SERPL-MCNC: 183 MG/DL (ref 120–199)
CHOLEST/HDLC SERPL: 4 {RATIO} (ref 2–5)
CREAT UR-MCNC: 17 MG/DL (ref 15–325)
DIFFERENTIAL METHOD: ABNORMAL
EOSINOPHIL # BLD AUTO: 0.1 K/UL (ref 0–0.5)
EOSINOPHIL NFR BLD: 1.7 % (ref 0–8)
ERYTHROCYTE [DISTWIDTH] IN BLOOD BY AUTOMATED COUNT: 17 % (ref 11.5–14.5)
ESTIMATED AVG GLUCOSE: 249 MG/DL (ref 68–131)
HBA1C MFR BLD: 10.3 % (ref 4–5.6)
HCT VFR BLD AUTO: 33.4 % (ref 37–48.5)
HDLC SERPL-MCNC: 46 MG/DL (ref 40–75)
HDLC SERPL: 25.1 % (ref 20–50)
HGB BLD-MCNC: 9.6 G/DL (ref 12–16)
IMM GRANULOCYTES # BLD AUTO: 0.02 K/UL (ref 0–0.04)
IMM GRANULOCYTES NFR BLD AUTO: 0.3 % (ref 0–0.5)
LDLC SERPL CALC-MCNC: 86 MG/DL (ref 63–159)
LYMPHOCYTES # BLD AUTO: 1.4 K/UL (ref 1–4.8)
LYMPHOCYTES NFR BLD: 20.7 % (ref 18–48)
MCH RBC QN AUTO: 23.1 PG (ref 27–31)
MCHC RBC AUTO-ENTMCNC: 28.7 G/DL (ref 32–36)
MCV RBC AUTO: 81 FL (ref 82–98)
MICROALBUMIN UR DL<=1MG/L-MCNC: 16 UG/ML
MONOCYTES # BLD AUTO: 0.5 K/UL (ref 0.3–1)
MONOCYTES NFR BLD: 7.9 % (ref 4–15)
NEUTROPHILS # BLD AUTO: 4.6 K/UL (ref 1.8–7.7)
NEUTROPHILS NFR BLD: 68.6 % (ref 38–73)
NONHDLC SERPL-MCNC: 137 MG/DL
NRBC BLD-RTO: 0 /100 WBC
PLATELET # BLD AUTO: 224 K/UL (ref 150–450)
PMV BLD AUTO: 11.5 FL (ref 9.2–12.9)
RBC # BLD AUTO: 4.15 M/UL (ref 4–5.4)
T4 FREE SERPL-MCNC: 1.01 NG/DL (ref 0.71–1.51)
TRIGL SERPL-MCNC: 255 MG/DL (ref 30–150)
TSH SERPL DL<=0.005 MIU/L-ACNC: 1.62 UIU/ML (ref 0.4–4)
WBC # BLD AUTO: 6.62 K/UL (ref 3.9–12.7)

## 2023-02-16 PROCEDURE — 80061 LIPID PANEL: CPT | Performed by: PHYSICIAN ASSISTANT

## 2023-02-16 PROCEDURE — 83036 HEMOGLOBIN GLYCOSYLATED A1C: CPT | Performed by: PHYSICIAN ASSISTANT

## 2023-02-16 PROCEDURE — 85025 COMPLETE CBC W/AUTO DIFF WBC: CPT | Performed by: PHYSICIAN ASSISTANT

## 2023-02-16 PROCEDURE — 36415 COLL VENOUS BLD VENIPUNCTURE: CPT | Performed by: PHYSICIAN ASSISTANT

## 2023-02-16 PROCEDURE — 80053 COMPREHEN METABOLIC PANEL: CPT | Performed by: PHYSICIAN ASSISTANT

## 2023-02-16 PROCEDURE — 84439 ASSAY OF FREE THYROXINE: CPT | Performed by: PHYSICIAN ASSISTANT

## 2023-02-16 PROCEDURE — 83880 ASSAY OF NATRIURETIC PEPTIDE: CPT | Performed by: PHYSICIAN ASSISTANT

## 2023-02-16 PROCEDURE — 84443 ASSAY THYROID STIM HORMONE: CPT | Performed by: PHYSICIAN ASSISTANT

## 2023-02-17 ENCOUNTER — PATIENT MESSAGE (OUTPATIENT)
Dept: FAMILY MEDICINE | Facility: CLINIC | Age: 63
End: 2023-02-17
Payer: MEDICARE

## 2023-02-17 LAB
ALBUMIN SERPL BCP-MCNC: 3.5 G/DL (ref 3.5–5.2)
ALP SERPL-CCNC: 150 U/L (ref 55–135)
ALT SERPL W/O P-5'-P-CCNC: 14 U/L (ref 10–44)
ANION GAP SERPL CALC-SCNC: 12 MMOL/L (ref 8–16)
AST SERPL-CCNC: 10 U/L (ref 10–40)
BILIRUB SERPL-MCNC: 0.3 MG/DL (ref 0.1–1)
BUN SERPL-MCNC: 11 MG/DL (ref 8–23)
CALCIUM SERPL-MCNC: 9.3 MG/DL (ref 8.7–10.5)
CHLORIDE SERPL-SCNC: 105 MMOL/L (ref 95–110)
CO2 SERPL-SCNC: 21 MMOL/L (ref 23–29)
CREAT SERPL-MCNC: 0.8 MG/DL (ref 0.5–1.4)
EST. GFR  (NO RACE VARIABLE): >60 ML/MIN/1.73 M^2
GLUCOSE SERPL-MCNC: 465 MG/DL (ref 70–110)
POTASSIUM SERPL-SCNC: 4.1 MMOL/L (ref 3.5–5.1)
PROT SERPL-MCNC: 7.1 G/DL (ref 6–8.4)
SODIUM SERPL-SCNC: 138 MMOL/L (ref 136–145)

## 2023-02-23 ENCOUNTER — OFFICE VISIT (OUTPATIENT)
Dept: FAMILY MEDICINE | Facility: CLINIC | Age: 63
End: 2023-02-23
Payer: MEDICARE

## 2023-02-23 ENCOUNTER — TELEPHONE (OUTPATIENT)
Dept: UROLOGY | Facility: CLINIC | Age: 63
End: 2023-02-23
Payer: MEDICARE

## 2023-02-23 VITALS
HEART RATE: 84 BPM | BODY MASS INDEX: 38.95 KG/M2 | WEIGHT: 206.13 LBS | DIASTOLIC BLOOD PRESSURE: 68 MMHG | SYSTOLIC BLOOD PRESSURE: 102 MMHG

## 2023-02-23 DIAGNOSIS — D64.9 ANEMIA, UNSPECIFIED TYPE: ICD-10-CM

## 2023-02-23 DIAGNOSIS — R05.9 COUGH, UNSPECIFIED TYPE: Primary | ICD-10-CM

## 2023-02-23 PROCEDURE — 3060F POS MICROALBUMINURIA REV: CPT | Mod: CPTII,S$GLB,, | Performed by: PHYSICIAN ASSISTANT

## 2023-02-23 PROCEDURE — 3046F HEMOGLOBIN A1C LEVEL >9.0%: CPT | Mod: CPTII,S$GLB,, | Performed by: PHYSICIAN ASSISTANT

## 2023-02-23 PROCEDURE — 3066F PR DOCUMENTATION OF TREATMENT FOR NEPHROPATHY: ICD-10-PCS | Mod: CPTII,S$GLB,, | Performed by: PHYSICIAN ASSISTANT

## 2023-02-23 PROCEDURE — 3046F PR MOST RECENT HEMOGLOBIN A1C LEVEL > 9.0%: ICD-10-PCS | Mod: CPTII,S$GLB,, | Performed by: PHYSICIAN ASSISTANT

## 2023-02-23 PROCEDURE — 4010F ACE/ARB THERAPY RXD/TAKEN: CPT | Mod: CPTII,S$GLB,, | Performed by: PHYSICIAN ASSISTANT

## 2023-02-23 PROCEDURE — 4010F PR ACE/ARB THEARPY RXD/TAKEN: ICD-10-PCS | Mod: CPTII,S$GLB,, | Performed by: PHYSICIAN ASSISTANT

## 2023-02-23 PROCEDURE — 3008F PR BODY MASS INDEX (BMI) DOCUMENTED: ICD-10-PCS | Mod: CPTII,S$GLB,, | Performed by: PHYSICIAN ASSISTANT

## 2023-02-23 PROCEDURE — 3060F PR POS MICROALBUMINURIA RESULT DOCUMENTED/REVIEW: ICD-10-PCS | Mod: CPTII,S$GLB,, | Performed by: PHYSICIAN ASSISTANT

## 2023-02-23 PROCEDURE — 3078F PR MOST RECENT DIASTOLIC BLOOD PRESSURE < 80 MM HG: ICD-10-PCS | Mod: CPTII,S$GLB,, | Performed by: PHYSICIAN ASSISTANT

## 2023-02-23 PROCEDURE — 3074F PR MOST RECENT SYSTOLIC BLOOD PRESSURE < 130 MM HG: ICD-10-PCS | Mod: CPTII,S$GLB,, | Performed by: PHYSICIAN ASSISTANT

## 2023-02-23 PROCEDURE — 3066F NEPHROPATHY DOC TX: CPT | Mod: CPTII,S$GLB,, | Performed by: PHYSICIAN ASSISTANT

## 2023-02-23 PROCEDURE — 3078F DIAST BP <80 MM HG: CPT | Mod: CPTII,S$GLB,, | Performed by: PHYSICIAN ASSISTANT

## 2023-02-23 PROCEDURE — 99214 PR OFFICE/OUTPT VISIT, EST, LEVL IV, 30-39 MIN: ICD-10-PCS | Mod: S$GLB,,, | Performed by: PHYSICIAN ASSISTANT

## 2023-02-23 PROCEDURE — 3074F SYST BP LT 130 MM HG: CPT | Mod: CPTII,S$GLB,, | Performed by: PHYSICIAN ASSISTANT

## 2023-02-23 PROCEDURE — 3008F BODY MASS INDEX DOCD: CPT | Mod: CPTII,S$GLB,, | Performed by: PHYSICIAN ASSISTANT

## 2023-02-23 PROCEDURE — 99214 OFFICE O/P EST MOD 30 MIN: CPT | Mod: S$GLB,,, | Performed by: PHYSICIAN ASSISTANT

## 2023-02-23 RX ORDER — DESLORATADINE 5 MG/1
5 TABLET ORAL DAILY
Qty: 30 TABLET | Refills: 11 | Status: SHIPPED | OUTPATIENT
Start: 2023-02-23 | End: 2023-10-02 | Stop reason: SDUPTHER

## 2023-02-23 RX ORDER — OXYBUTYNIN CHLORIDE 10 MG/1
10 TABLET, EXTENDED RELEASE ORAL DAILY
Qty: 30 TABLET | Refills: 11 | Status: SHIPPED | OUTPATIENT
Start: 2023-02-23 | End: 2023-10-02 | Stop reason: SDUPTHER

## 2023-02-23 RX ORDER — PROMETHAZINE HYDROCHLORIDE AND DEXTROMETHORPHAN HYDROBROMIDE 6.25; 15 MG/5ML; MG/5ML
5 SYRUP ORAL 3 TIMES DAILY PRN
Qty: 240 ML | Refills: 0 | Status: SHIPPED | OUTPATIENT
Start: 2023-02-23 | End: 2023-03-05

## 2023-02-23 RX ORDER — ALPRAZOLAM 1 MG/1
1 TABLET ORAL 2 TIMES DAILY
Qty: 60 TABLET | Refills: 2 | Status: SHIPPED | OUTPATIENT
Start: 2023-02-23 | End: 2023-05-19

## 2023-02-23 NOTE — TELEPHONE ENCOUNTER
----- Message from Boogie Clarke sent at 2/23/2023 10:43 AM CST -----  Regarding: sooner appt  Contact: patient  Patient want to speak with a nurse regarding scheduling appointment tomorrow for leakage bladder, please call back at [phone]    Case number [id number]

## 2023-02-23 NOTE — PROGRESS NOTES
Subjective:       Patient ID: Amanda Barton is a 62 y.o. female.    Chief Complaint: Follow-up (F/u labs)    Patient is a 63 yo female who is coming in for a follow up.     Cough  This is a recurrent problem. The current episode started more than 1 month ago. The problem has been unchanged. The problem occurs every few minutes. The cough is Non-productive. Associated symptoms include shortness of breath. Pertinent negatives include no chest pain, chills or fever. Nothing aggravates the symptoms. Treatments tried: symbocort. Her past medical history is significant for COPD.   Diabetes  She presents for her follow-up diabetic visit. She has type 2 diabetes mellitus. Her disease course has been worsening. There are no hypoglycemic associated symptoms. There are no diabetic associated symptoms. Pertinent negatives for diabetes include no chest pain and no fatigue. There are no hypoglycemic complications. Risk factors for coronary artery disease include diabetes mellitus, dyslipidemia, hypertension, post-menopausal, sedentary lifestyle and stress. Current diabetic treatment includes diet and intensive insulin program. She is compliant with treatment some of the time. Her weight is fluctuating minimally. She is following a generally healthy diet. She never participates in exercise. Her home blood glucose trend is fluctuating dramatically. Her breakfast blood glucose range is generally 110-130 mg/dl. An ACE inhibitor/angiotensin II receptor blocker is being taken. She does not see a podiatrist.Eye exam is not current.   Anemia  Presents for follow-up visit. There has been no abdominal pain, fever or palpitations.   Review of Systems   Constitutional:  Negative for activity change, chills, fatigue and fever.   HENT: Negative.     Respiratory:  Positive for cough and shortness of breath.    Cardiovascular: Negative.  Negative for chest pain, palpitations, leg swelling and claudication.   Gastrointestinal:  Negative for  abdominal pain, blood in stool and diarrhea.   Musculoskeletal:  Negative for arthralgias.       Patient Active Problem List   Diagnosis    Type 2 diabetes mellitus without complication, with long-term current use of insulin    History of non-ST elevation myocardial infarction (NSTEMI)    BJ (obstructive sleep apnea)    Bacteremia due to methicillin resistant Staphylococcus aureus    Chronic diastolic heart failure    Diastolic dysfunction    Paroxysmal atrial fibrillation    Pericarditis    Cough    Sepsis    Febrile illness    Iron deficiency anemia    Troponin level elevated    Obesity    Chronic anticoagulation    Symptomatic anemia    Constipation    GI bleed    Diabetes mellitus       Objective:      Physical Exam  Vitals reviewed.   Constitutional:       General: She is not in acute distress.     Appearance: Normal appearance. She is not ill-appearing, toxic-appearing or diaphoretic.   HENT:      Head: Normocephalic and atraumatic.   Cardiovascular:      Rate and Rhythm: Normal rate and regular rhythm.      Pulses: Normal pulses.      Heart sounds: Normal heart sounds. No murmur heard.    No friction rub. No gallop.   Pulmonary:      Effort: Pulmonary effort is normal. No respiratory distress.      Breath sounds: Normal breath sounds. No stridor. No wheezing, rhonchi or rales.   Chest:      Chest wall: No tenderness.   Abdominal:      Palpations: Abdomen is soft.      Tenderness: There is no abdominal tenderness.   Musculoskeletal:         General: No swelling or tenderness.   Neurological:      Mental Status: She is alert.       Lab Results   Component Value Date    WBC 6.62 02/16/2023    HGB 9.6 (L) 02/16/2023    HCT 33.4 (L) 02/16/2023    MCV 81 (L) 02/16/2023     02/16/2023     CMP  Sodium   Date Value Ref Range Status   02/16/2023 138 136 - 145 mmol/L Final     Potassium   Date Value Ref Range Status   02/16/2023 4.1 3.5 - 5.1 mmol/L Final     Chloride   Date Value Ref Range Status   02/16/2023  105 95 - 110 mmol/L Final     CO2   Date Value Ref Range Status   02/16/2023 21 (L) 23 - 29 mmol/L Final     Glucose   Date Value Ref Range Status   02/16/2023 465 (HH) 70 - 110 mg/dL Final     Comment:     *Critical value notification by ml__ with confirmation of receipt to  _irma gambino rn__ at Date_02/17___Time_08:04am___       BUN   Date Value Ref Range Status   02/16/2023 11 8 - 23 mg/dL Final     Creatinine   Date Value Ref Range Status   02/16/2023 0.8 0.5 - 1.4 mg/dL Final     Calcium   Date Value Ref Range Status   02/16/2023 9.3 8.7 - 10.5 mg/dL Final     Total Protein   Date Value Ref Range Status   02/16/2023 7.1 6.0 - 8.4 g/dL Final     Albumin   Date Value Ref Range Status   02/16/2023 3.5 3.5 - 5.2 g/dL Final     Total Bilirubin   Date Value Ref Range Status   02/16/2023 0.3 0.1 - 1.0 mg/dL Final     Comment:     For infants and newborns, interpretation of results should be based  on gestational age, weight and in agreement with clinical  observations.    Premature Infant recommended reference ranges:  Up to 24 hours.............<8.0 mg/dL  Up to 48 hours............<12.0 mg/dL  3-5 days..................<15.0 mg/dL  6-29 days.................<15.0 mg/dL       Alkaline Phosphatase   Date Value Ref Range Status   02/16/2023 150 (H) 55 - 135 U/L Final     AST   Date Value Ref Range Status   02/16/2023 10 10 - 40 U/L Final     ALT   Date Value Ref Range Status   02/16/2023 14 10 - 44 U/L Final     Anion Gap   Date Value Ref Range Status   02/16/2023 12 8 - 16 mmol/L Final     eGFR if    Date Value Ref Range Status   11/21/2019 >60 >60 mL/min/1.73 m^2 Final     eGFR if non    Date Value Ref Range Status   11/21/2019 >60 >60 mL/min/1.73 m^2 Final     Comment:     Calculation used to obtain the estimated glomerular filtration  rate (eGFR) is the CKD-EPI equation.        Lab Results   Component Value Date    CHOL 183 02/16/2023     Lab Results   Component Value Date    HDL  46 02/16/2023     Lab Results   Component Value Date    LDLCALC 86.0 02/16/2023     Lab Results   Component Value Date    TRIG 255 (H) 02/16/2023     Lab Results   Component Value Date    CHOLHDL 25.1 02/16/2023     Lab Results   Component Value Date    HGBA1C 10.3 (H) 02/16/2023    Amanda Batron  Cardiac catheterization  Order# [id number]  Reading physician: Osman Williamson MD Ordering physician: Osman Williamson MD Study date: 2/22/23     Patient Information    Name MRN Description   Amanda Barton [id number] 62 y.o. female     Physicians    Panel Physicians Referring Physician Case Authorizing Physician   Osman Williamson MD (Primary)       Indications    Atherosclerosis of native coronary artery of native heart with unstable angina pectoris [I25.110 (ICD-10-CM)]     Summary         The estimated blood loss was none.     Procedure:    Right and Left heart catheterization, coronary angiography left ventriculography with cardiac output measurement    Monitored conscious sedation 30 minutes     Indication:     Angina abnormal stress test multiple cardiac risk factors     Procedure in detail:    After informed consent patient was brought to cath lab.  The right and we will space had an IV.  This was anesthetized.  Prepped and draped.  We upsized over a microcatheter wire to a 6 [place] sheath.  This was flushed.  We accessed the right radial artery with a 4 [place] sheath.  Diagnostic left heart catheterization was performed.  JL4 and JR4 catheters were used.  The JR4 was used to perform left ventriculography.  We then performed right heart catheterization with cardiac output measurements.  Radial band was applied.  Manual pressure was applied to the venous access.  No immediate complications were noted.  The patient was returned to her room in stable vitals hemodynamics no complaints in no distress.      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  It  is free of angiographic disease.      Left anterior descending coronary artery:  This is a large vessel.  Has mild (20-30%) disease in its proximal portion circumflex coronary artery:  This is a large vessel.  Gives rise to a medium is no significant disease of the circumflex or its branches.      Right coronary artery:  This is a large dominant vessel.  It is free of angiographic disease.    Left ventriculogram:  BAKER 30 left ventriculogram shows normal LV function.  LVEDP was 15-20 mm Hg.  No pullback gradient.      Right heart catheterization data:    Right atrium 20-25 mm Hg, right ventricle 45/25 mm Hg, pulmonary artery 45/30 mm Hg, capillary wedge 20-25 mm Hg.  Cardiac output 5.6 liters/minute.  Cardiac index 2.9 liters/minute per meter squared.    Impression:    Mild atherosclerotic plaquing involving the proximal LAD with otherwise no significant obstructive angiographic coronary artery disease.      Normal LV systolic function with upper normal or slightly elevated left ventricular end-diastolic pressure.      Mildly increased right heart pressures with normal cardiac output and index.      Plan:    Continue present management consider increasing diuretics to help lower LVEDP and capillary wedge pressure    Suspect that the patient's dyspnea is multifactorial and related to underlying lung condition, age, deconditioning.      Consider outpatient Holter monitoring to further assess heart rate for arrhythmias.         Procedures    Right and Left Heart Cath     View Images Vital Vitrea     Show images for Cardiac catheterization    Pre Procedure Diagnosis    Atherosclerotic heart disease of native coronary artery with unstable angina pectoris [I25.110]      Post Procedure Diagnosis    Atherosclerotic heart disease of native coronary artery with unstable angina pectoris [I25.110]        Procedural Details    Amanda Barton was counseled regarding the potential benefits, risks, and alternatives to the  procedure(s). The patient gave informed consent and consent forms were signed. The patient was brought to the cath lab in a fasting state, prepped, and draped in the usual sterile manner.      Signed    Procedure Log and Final Result signed by Osman Williamson MD on 2/22/23 at 0820 CST     Physician Certification    I certify that I was present for catheter insertion, catheter manipulation, angiography, and angiographic interpretation of this patient.      Assessment:       Problem List Items Addressed This Visit       Cough - Primary    Relevant Medications    promethazine-dextromethorphan (PROMETHAZINE-DM) 6.25-15 mg/5 mL Syrp    Other Relevant Orders    Ambulatory referral/consult to Pulmonology   DM 2 uncontrolled    Other Visit Diagnoses       Anemia, unspecified type        Relevant Orders    Ambulatory referral/consult to Hematology / Oncology              Plan:       Cough, unspecified type  -     Ambulatory referral/consult to Pulmonology; Future; Expected date: 03/02/2023  -     promethazine-dextromethorphan (PROMETHAZINE-DM) 6.25-15 mg/5 mL Syrp; Take 5 mLs by mouth 3 (three) times daily as needed (cough).  Dispense: 240 mL; Refill: 0    DM 2 uncontrolled.   Endocrinology referral done.     Anemia, unspecified type  -     Ambulatory referral/consult to Hematology / Oncology; Future; Expected date: 03/02/2023    Other orders  -     desloratadine (CLARINEX) 5 mg tablet; Take 1 tablet (5 mg total) by mouth once daily.  Dispense: 30 tablet; Refill: 11  -     oxybutynin (DITROPAN-XL) 10 MG 24 hr tablet; Take 1 tablet (10 mg total) by mouth once daily.  Dispense: 30 tablet; Refill: 11  -     ALPRAZolam (XANAX) 1 MG tablet; Take 1 tablet (1 mg total) by mouth 2 (two) times daily.  Dispense: 60 tablet; Refill: 2         I spent 30 minutes on this encounter, time includes face-to-face, chart review, documentation, test review and orders.     normal...

## 2023-02-24 ENCOUNTER — TELEPHONE (OUTPATIENT)
Dept: HEMATOLOGY/ONCOLOGY | Facility: CLINIC | Age: 63
End: 2023-02-24
Payer: MEDICARE

## 2023-02-24 ENCOUNTER — DOCUMENTATION ONLY (OUTPATIENT)
Dept: HEMATOLOGY/ONCOLOGY | Facility: CLINIC | Age: 63
End: 2023-02-24
Payer: MEDICARE

## 2023-02-24 NOTE — TELEPHONE ENCOUNTER
Pt scheduled incorrectly on hemonc schedule w/ Dr Diamond for Monday 2/27 at 10am.  Pt dx is anemia and should be on the benign hem schedule.    LVM for pt to call back.     If no contact w/ pt, will have slot changed to benign hem by advanced schedulers,.

## 2023-02-26 NOTE — PROGRESS NOTES
Name: Amanda Barton  MRN:  [id number]  :  1960 Age 62 y.o.  Date of Service: 2023    Reason for visit:  Amanda Barton is a 62 y.o. female here regarding anemia.    Hematology History/History of Present Illness:     Ms. Barton is a 62-year-old female with history of paroxysmal atrial fibrillation, pericarditis with prolonged hospitalization in 2018, NSTEMI who presents to us as a new patient in hematology clinic on 23.    Regarding her anemia, she is had longstanding profound anemia dating back to 2018.  She is never had a hemoglobin greater than 10.   She is had consistent microcytosis with a MCV between 78 and 80.  RDW is markedly elevated up to 18-19.    Regarding blood losses she denies hematochezia, melena, hemoptysis, epistaxis, reports she had a hysterectomy many years ago so no vaginal bleeding.  She had a colonoscopy/EGD approximately 3 weeks ago (2023) with no etiology of anemia.  Gastroenterologist recommended PillCam which they plan to complete after she has workup for chronic cough.    She reports she has numerous medical problems and is seeing a number of subspecialists including a endocrinologist, cardiologist, and pulmonologist.  She says she has profound fatigue and feels tired all the time.  She has a chronic cough that has been going on for weeks which is distressing to her, her daughter reports she is had an extensive workup for the cough without a diagnosis, she is scheduled to see a pulmonologist for further workup soon.    Past Medical History:   Diagnosis Date    Acute bacterial pericarditis 2017    Anemia     Bacteremia due to methicillin resistant Staphylococcus aureus 2017    Diabetes mellitus     Encounter for blood transfusion     GERD (gastroesophageal reflux disease)     Heart murmur     History of pneumonia     History of UTI     Hypertension     Migraine headache     Sleep apnea     no machine yet    Type 2 diabetes mellitus without complication,  "with long-term current use of insulin 2017       Past Surgical History:   Procedure Laterality Date    APPENDECTOMY      CATHETERIZATION OF BOTH LEFT AND RIGHT HEART N/A 2023    Procedure: Right and Left Heart Cath;  Surgeon: Osman Williamson MD;  Location: [facility] CATH;  Service: Cardiology;  Laterality: N/A;     SECTION      CHOLECYSTECTOMY      COLONOSCOPY N/A 2017    Procedure: COLONOSCOPY;  Surgeon: Juan Lepe MD;  Location: [facility] ENDO;  Service: Endoscopy;  Laterality: N/A;    ESOPHAGOGASTRODUODENOSCOPY N/A 2019    Procedure: EGD (ESOPHAGOGASTRODUODENOSCOPY);  Surgeon: Gustavo Austin MD;  Location: [facility] ENDO;  Service: Endoscopy;  Laterality: N/A;  with Push Enteroscopy    ESOPHAGOGASTRODUODENOSCOPY N/A 2019    Procedure: EGD (ESOPHAGOGASTRODUODENOSCOPY);  Surgeon: Gustavo Austin MD;  Location: [facility] ENDO;  Service: Endoscopy;  Laterality: N/A;  Push enteroscopy    HYSTERECTOMY      IRRIGATION AND DEBRIDEMENT OF LUMBAR SPINE  2022    LUMBAR FUSION  2021    L4-L5       Allergies as of 2023 - Reviewed 2023   Allergen Reaction Noted    Iodine Nausea Only 2019       Family History   Problem Relation Age of Onset    No Known Problems Mother     Heart disease Father     Heart failure Father     Arrhythmia Father        Social History     Tobacco Use    Smoking status: Never    Smokeless tobacco: Never   Substance Use Topics    Alcohol use: No       PHYSICAL EXAMINATION:  /69 (BP Location: Left arm, Patient Position: Sitting, BP Method: Medium (Automatic))   Pulse 81   Temp 97.8 °F (36.6 °C) (Temporal)   Resp 16   Ht 5' 1" (1.549 m)   Wt 95.1 kg (209 lb 10.5 oz)   SpO2 (!) 92%   BMI 39.61 kg/m²   Wt Readings from Last 3 Encounters:   23 95.1 kg (209 lb 10.5 oz)   23 93.5 kg (206 lb 2.1 oz)   23 93.4 kg (206 lb)     ECOG PERFORMANCE STATUS: 2  Physical Exam   General:  chronically ill-appearing, overweight  Eyes:  " Equal and round pupils, EOMI, no scleral icterus  Mouth:  No lesions, moist  Cardiovascular:  Warm, well-perfused, + peripheral edema  Lungs:  Unlabored on room air, + cough  Neurologic:  Awake, alert and oriented, participating in the exam  Psych:  Appropriate mood and affect  Skin:  Normal pallor, No rashes  Heme:  No petechiae, no purpura    LABORATORY:  CBC  Lab Results   Component Value Date    WBC 6.62 02/16/2023    HGB 9.6 (L) 02/16/2023    HCT 33.4 (L) 02/16/2023    MCV 81 (L) 02/16/2023     02/16/2023         BMP  Lab Results   Component Value Date     02/16/2023    K 4.1 02/16/2023     02/16/2023    CO2 21 (L) 02/16/2023    BUN 11 02/16/2023    CREATININE 0.8 02/16/2023    CALCIUM 9.3 02/16/2023    ANIONGAP 12 02/16/2023    ESTGFRAFRICA >60 11/21/2019    EGFRNONAA >60 11/21/2019         PATHOLOGY:  N/a     RADIOLOGY:  N/a    ASSESSMENT AND PLAN:    Amanda Barton is a 62 y.o. female with longstanding iron-deficiency anemia    # iron-deficiency anemia  -she is had longstanding profound anemia dating back to 2018.  She is never had a hemoglobin greater than 10.  She ihad consistent microcytosis with MCV between 78 and 80.  RDW is markedly elevated up to 18-19.  -her last iron studies were 3 years ago with a ferritin of 10, confirming iron-deficiency anemia  -last colonoscopy and EGD patient reports was 3 weeks ago (outside [facility]) was relatively normal with no source of blood loss identified,gastroenterologist recommended PillCam which they plan on completing once they workup her chronic cough  -patient reports she has previously been on oral iron and is unable to tolerate this due to GI side effects including vomiting  -she needs rapid replacement of her iron, I think this is best suited with IV iron supplementation, calculation of her iron deficit shows she has approximately a 1400mg deficit.  Will obtain complete iron studies today and replace 1000 mg to start- venofer 200mg x 5  infusions, patient is agreeable to this plan, return to clinic in 2 months w/repeat labs.     # NSTEMI  -followed by Cardiology, on carvedilol and losartan    # type 2 diabetes  -on Lantus, managed by primary care    # obesity with BMI of 38  -with complication, type 2 diabetes and hypertension    Brenda Diamond M.D.  Hematology/Oncology     Follow up as noted below:     Route Chart for Scheduling    Med Onc Chart Routing      Follow up with physician 2 months.   Follow up with IZZY    Infusion scheduling note    Injection scheduling note iron infusions x5   Labs CMP, CBC, ferritin and iron and TIBC   Lab interval:  iron labs today and again in 2 months   Imaging    Pharmacy appointment    Other referrals          Supportive Plan Information  OP IRON SUCROSE IVPB QW   Brenda Diamond MD   Upcoming Treatment Dates - OP IRON SUCROSE IVPB QW    3/6/2023       Medications       iron sucrose (VENOFER) 200 mg in sodium chloride 0.9% 100 mL IVPB  3/13/2023       Medications       iron sucrose (VENOFER) in sodium chloride 0.9% 100 mL IVPB  3/20/2023       Medications       iron sucrose (VENOFER) in sodium chloride 0.9% 100 mL IVPB  3/27/2023       Medications       iron sucrose (VENOFER) in sodium chloride 0.9% 100 mL IVPB    Therapy Plan Information  Flushes  heparin, porcine (PF) 100 unit/mL injection flush 500 Units  500 Units, Intravenous, PRN  sodium chloride 0.9% 100 mL flush bag  Intravenous, Every visit  PRN Medications  EPINEPHrine (EPIPEN) 0.3 mg/0.3 mL pen injection 0.3 mg  0.3 mg, Intramuscular, PRN  diphenhydrAMINE injection 50 mg  50 mg, Intravenous, PRN  methylPREDNISolone sodium succinate injection 125 mg  125 mg, Intravenous, PRN  sodium chloride 0.9% bolus 1,000 mL 1,000 mL  1,000 mL, Intravenous, PRN

## 2023-02-27 ENCOUNTER — OFFICE VISIT (OUTPATIENT)
Dept: HEMATOLOGY/ONCOLOGY | Facility: CLINIC | Age: 63
End: 2023-02-27
Payer: MEDICARE

## 2023-02-27 ENCOUNTER — LAB VISIT (OUTPATIENT)
Dept: LAB | Facility: HOSPITAL | Age: 63
End: 2023-02-27
Attending: INTERNAL MEDICINE
Payer: MEDICARE

## 2023-02-27 ENCOUNTER — TELEPHONE (OUTPATIENT)
Dept: FAMILY MEDICINE | Facility: CLINIC | Age: 63
End: 2023-02-27
Payer: MEDICARE

## 2023-02-27 VITALS
SYSTOLIC BLOOD PRESSURE: 116 MMHG | TEMPERATURE: 98 F | HEIGHT: 61 IN | RESPIRATION RATE: 16 BRPM | DIASTOLIC BLOOD PRESSURE: 69 MMHG | HEART RATE: 81 BPM | OXYGEN SATURATION: 92 % | WEIGHT: 209.69 LBS | BODY MASS INDEX: 39.59 KG/M2

## 2023-02-27 DIAGNOSIS — Z79.4 TYPE 2 DIABETES MELLITUS WITHOUT COMPLICATION, WITH LONG-TERM CURRENT USE OF INSULIN: ICD-10-CM

## 2023-02-27 DIAGNOSIS — D64.9 ANEMIA, UNSPECIFIED TYPE: ICD-10-CM

## 2023-02-27 DIAGNOSIS — E66.01 CLASS 2 SEVERE OBESITY WITH SERIOUS COMORBIDITY AND BODY MASS INDEX (BMI) OF 38.0 TO 38.9 IN ADULT, UNSPECIFIED OBESITY TYPE: ICD-10-CM

## 2023-02-27 DIAGNOSIS — E11.9 TYPE 2 DIABETES MELLITUS WITHOUT COMPLICATION, WITH LONG-TERM CURRENT USE OF INSULIN: ICD-10-CM

## 2023-02-27 DIAGNOSIS — D64.9 SYMPTOMATIC ANEMIA: ICD-10-CM

## 2023-02-27 DIAGNOSIS — D50.0 IRON DEFICIENCY ANEMIA DUE TO CHRONIC BLOOD LOSS: Primary | ICD-10-CM

## 2023-02-27 DIAGNOSIS — D50.0 IRON DEFICIENCY ANEMIA DUE TO CHRONIC BLOOD LOSS: ICD-10-CM

## 2023-02-27 LAB
ALBUMIN SERPL BCP-MCNC: 3.2 G/DL (ref 3.5–5.2)
ALP SERPL-CCNC: 133 U/L (ref 55–135)
ALT SERPL W/O P-5'-P-CCNC: 13 U/L (ref 10–44)
ANION GAP SERPL CALC-SCNC: 10 MMOL/L (ref 8–16)
AST SERPL-CCNC: 10 U/L (ref 10–40)
BASOPHILS # BLD AUTO: 0.03 K/UL (ref 0–0.2)
BASOPHILS NFR BLD: 0.5 % (ref 0–1.9)
BILIRUB SERPL-MCNC: 0.3 MG/DL (ref 0.1–1)
BUN SERPL-MCNC: 14 MG/DL (ref 8–23)
CALCIUM SERPL-MCNC: 8.8 MG/DL (ref 8.7–10.5)
CHLORIDE SERPL-SCNC: 101 MMOL/L (ref 95–110)
CO2 SERPL-SCNC: 26 MMOL/L (ref 23–29)
CREAT SERPL-MCNC: 0.9 MG/DL (ref 0.5–1.4)
DIFFERENTIAL METHOD: ABNORMAL
EOSINOPHIL # BLD AUTO: 0.1 K/UL (ref 0–0.5)
EOSINOPHIL NFR BLD: 2.1 % (ref 0–8)
ERYTHROCYTE [DISTWIDTH] IN BLOOD BY AUTOMATED COUNT: 16.4 % (ref 11.5–14.5)
EST. GFR  (NO RACE VARIABLE): >60 ML/MIN/1.73 M^2
FERRITIN SERPL-MCNC: 21 NG/ML (ref 20–300)
GLUCOSE SERPL-MCNC: 450 MG/DL (ref 70–110)
HCT VFR BLD AUTO: 31.8 % (ref 37–48.5)
HGB BLD-MCNC: 9.5 G/DL (ref 12–16)
IMM GRANULOCYTES # BLD AUTO: 0.02 K/UL (ref 0–0.04)
IMM GRANULOCYTES NFR BLD AUTO: 0.3 % (ref 0–0.5)
IRON SERPL-MCNC: 38 UG/DL (ref 30–160)
LYMPHOCYTES # BLD AUTO: 1.1 K/UL (ref 1–4.8)
LYMPHOCYTES NFR BLD: 17.4 % (ref 18–48)
MCH RBC QN AUTO: 22.8 PG (ref 27–31)
MCHC RBC AUTO-ENTMCNC: 29.9 G/DL (ref 32–36)
MCV RBC AUTO: 76 FL (ref 82–98)
MONOCYTES # BLD AUTO: 0.5 K/UL (ref 0.3–1)
MONOCYTES NFR BLD: 8.4 % (ref 4–15)
NEUTROPHILS # BLD AUTO: 4.5 K/UL (ref 1.8–7.7)
NEUTROPHILS NFR BLD: 71.3 % (ref 38–73)
NRBC BLD-RTO: 0 /100 WBC
PLATELET # BLD AUTO: 221 K/UL (ref 150–450)
PMV BLD AUTO: 10.2 FL (ref 9.2–12.9)
POTASSIUM SERPL-SCNC: 3.6 MMOL/L (ref 3.5–5.1)
PROT SERPL-MCNC: 7.2 G/DL (ref 6–8.4)
RBC # BLD AUTO: 4.16 M/UL (ref 4–5.4)
SATURATED IRON: 7 % (ref 20–50)
SODIUM SERPL-SCNC: 137 MMOL/L (ref 136–145)
TOTAL IRON BINDING CAPACITY: 511 UG/DL (ref 250–450)
TRANSFERRIN SERPL-MCNC: 345 MG/DL (ref 200–375)
WBC # BLD AUTO: 6.33 K/UL (ref 3.9–12.7)

## 2023-02-27 PROCEDURE — 4010F PR ACE/ARB THEARPY RXD/TAKEN: ICD-10-PCS | Mod: CPTII,S$GLB,, | Performed by: INTERNAL MEDICINE

## 2023-02-27 PROCEDURE — 99999 PR PBB SHADOW E&M-EST. PATIENT-LVL V: ICD-10-PCS | Mod: PBBFAC,,, | Performed by: INTERNAL MEDICINE

## 2023-02-27 PROCEDURE — 3078F PR MOST RECENT DIASTOLIC BLOOD PRESSURE < 80 MM HG: ICD-10-PCS | Mod: CPTII,S$GLB,, | Performed by: INTERNAL MEDICINE

## 2023-02-27 PROCEDURE — 4010F ACE/ARB THERAPY RXD/TAKEN: CPT | Mod: CPTII,S$GLB,, | Performed by: INTERNAL MEDICINE

## 2023-02-27 PROCEDURE — 99205 PR OFFICE/OUTPT VISIT, NEW, LEVL V, 60-74 MIN: ICD-10-PCS | Mod: S$GLB,,, | Performed by: INTERNAL MEDICINE

## 2023-02-27 PROCEDURE — 3066F NEPHROPATHY DOC TX: CPT | Mod: CPTII,S$GLB,, | Performed by: INTERNAL MEDICINE

## 2023-02-27 PROCEDURE — 99205 OFFICE O/P NEW HI 60 MIN: CPT | Mod: S$GLB,,, | Performed by: INTERNAL MEDICINE

## 2023-02-27 PROCEDURE — 3074F PR MOST RECENT SYSTOLIC BLOOD PRESSURE < 130 MM HG: ICD-10-PCS | Mod: CPTII,S$GLB,, | Performed by: INTERNAL MEDICINE

## 2023-02-27 PROCEDURE — 1159F MED LIST DOCD IN RCRD: CPT | Mod: CPTII,S$GLB,, | Performed by: INTERNAL MEDICINE

## 2023-02-27 PROCEDURE — 82728 ASSAY OF FERRITIN: CPT | Performed by: INTERNAL MEDICINE

## 2023-02-27 PROCEDURE — 1159F PR MEDICATION LIST DOCUMENTED IN MEDICAL RECORD: ICD-10-PCS | Mod: CPTII,S$GLB,, | Performed by: INTERNAL MEDICINE

## 2023-02-27 PROCEDURE — 3060F POS MICROALBUMINURIA REV: CPT | Mod: CPTII,S$GLB,, | Performed by: INTERNAL MEDICINE

## 2023-02-27 PROCEDURE — 36415 COLL VENOUS BLD VENIPUNCTURE: CPT | Mod: PN | Performed by: INTERNAL MEDICINE

## 2023-02-27 PROCEDURE — 3066F PR DOCUMENTATION OF TREATMENT FOR NEPHROPATHY: ICD-10-PCS | Mod: CPTII,S$GLB,, | Performed by: INTERNAL MEDICINE

## 2023-02-27 PROCEDURE — 85025 COMPLETE CBC W/AUTO DIFF WBC: CPT | Mod: PN | Performed by: INTERNAL MEDICINE

## 2023-02-27 PROCEDURE — 3008F BODY MASS INDEX DOCD: CPT | Mod: CPTII,S$GLB,, | Performed by: INTERNAL MEDICINE

## 2023-02-27 PROCEDURE — 3046F PR MOST RECENT HEMOGLOBIN A1C LEVEL > 9.0%: ICD-10-PCS | Mod: CPTII,S$GLB,, | Performed by: INTERNAL MEDICINE

## 2023-02-27 PROCEDURE — 3060F PR POS MICROALBUMINURIA RESULT DOCUMENTED/REVIEW: ICD-10-PCS | Mod: CPTII,S$GLB,, | Performed by: INTERNAL MEDICINE

## 2023-02-27 PROCEDURE — 99999 PR PBB SHADOW E&M-EST. PATIENT-LVL V: CPT | Mod: PBBFAC,,, | Performed by: INTERNAL MEDICINE

## 2023-02-27 PROCEDURE — 80053 COMPREHEN METABOLIC PANEL: CPT | Mod: PN | Performed by: INTERNAL MEDICINE

## 2023-02-27 PROCEDURE — 3008F PR BODY MASS INDEX (BMI) DOCUMENTED: ICD-10-PCS | Mod: CPTII,S$GLB,, | Performed by: INTERNAL MEDICINE

## 2023-02-27 PROCEDURE — 3074F SYST BP LT 130 MM HG: CPT | Mod: CPTII,S$GLB,, | Performed by: INTERNAL MEDICINE

## 2023-02-27 PROCEDURE — 3046F HEMOGLOBIN A1C LEVEL >9.0%: CPT | Mod: CPTII,S$GLB,, | Performed by: INTERNAL MEDICINE

## 2023-02-27 PROCEDURE — 3078F DIAST BP <80 MM HG: CPT | Mod: CPTII,S$GLB,, | Performed by: INTERNAL MEDICINE

## 2023-02-27 PROCEDURE — 84466 ASSAY OF TRANSFERRIN: CPT | Performed by: INTERNAL MEDICINE

## 2023-02-27 PROCEDURE — 84238 ASSAY NONENDOCRINE RECEPTOR: CPT | Performed by: INTERNAL MEDICINE

## 2023-02-27 RX ORDER — SODIUM CHLORIDE 0.9 % (FLUSH) 0.9 %
10 SYRINGE (ML) INJECTION
Status: CANCELLED | OUTPATIENT
Start: 2023-03-06

## 2023-02-27 RX ORDER — HEPARIN 100 UNIT/ML
500 SYRINGE INTRAVENOUS
Status: CANCELLED | OUTPATIENT
Start: 2023-03-06

## 2023-02-27 NOTE — TELEPHONE ENCOUNTER
----- Message from Keyana Aparicio sent at 2/27/2023 10:45 AM CST -----  Contact: pt  Type:  Needs Medical Advice    Who Called: pt  Would the patient rather a call back or a response via [facility]? call  Best Call Back Number: [phone] (home)     Additional Information: States a referral was supposed to be sent but it was never received. Please advise thank you.

## 2023-02-27 NOTE — TELEPHONE ENCOUNTER
Called pt and her daughter, they have tried to get in with [facility] and they are stating no referral found. Linked earlene to referral and also faxing it. They verbally understood.

## 2023-02-28 ENCOUNTER — PATIENT MESSAGE (OUTPATIENT)
Dept: HEMATOLOGY/ONCOLOGY | Facility: CLINIC | Age: 63
End: 2023-02-28
Payer: MEDICARE

## 2023-03-01 ENCOUNTER — PATIENT MESSAGE (OUTPATIENT)
Dept: FAMILY MEDICINE | Facility: CLINIC | Age: 63
End: 2023-03-01
Payer: MEDICARE

## 2023-03-01 LAB — STFR SERPL-MCNC: 9.6 MG/L (ref 1.8–4.6)

## 2023-03-02 RX ORDER — FLUCONAZOLE 150 MG/1
150 TABLET ORAL ONCE
Qty: 1 TABLET | Refills: 0 | Status: SHIPPED | OUTPATIENT
Start: 2023-03-02 | End: 2023-03-02

## 2023-03-05 ENCOUNTER — PATIENT MESSAGE (OUTPATIENT)
Dept: FAMILY MEDICINE | Facility: CLINIC | Age: 63
End: 2023-03-05
Payer: MEDICARE

## 2023-03-05 DIAGNOSIS — R30.0 DYSURIA: Primary | ICD-10-CM

## 2023-03-06 ENCOUNTER — TELEPHONE (OUTPATIENT)
Dept: INFUSION THERAPY | Facility: HOSPITAL | Age: 63
End: 2023-03-06
Payer: MEDICARE

## 2023-03-06 ENCOUNTER — INFUSION (OUTPATIENT)
Dept: INFUSION THERAPY | Facility: HOSPITAL | Age: 63
End: 2023-03-06
Attending: INTERNAL MEDICINE
Payer: MEDICARE

## 2023-03-06 VITALS
HEART RATE: 71 BPM | WEIGHT: 203.06 LBS | SYSTOLIC BLOOD PRESSURE: 94 MMHG | DIASTOLIC BLOOD PRESSURE: 55 MMHG | BODY MASS INDEX: 38.34 KG/M2 | RESPIRATION RATE: 16 BRPM | HEIGHT: 61 IN | TEMPERATURE: 98 F

## 2023-03-06 DIAGNOSIS — D64.9 SYMPTOMATIC ANEMIA: Primary | ICD-10-CM

## 2023-03-06 DIAGNOSIS — D50.0 IRON DEFICIENCY ANEMIA DUE TO CHRONIC BLOOD LOSS: ICD-10-CM

## 2023-03-06 PROCEDURE — 25000003 PHARM REV CODE 250: Mod: PN | Performed by: INTERNAL MEDICINE

## 2023-03-06 PROCEDURE — A4216 STERILE WATER/SALINE, 10 ML: HCPCS | Mod: PN | Performed by: INTERNAL MEDICINE

## 2023-03-06 PROCEDURE — 96365 THER/PROPH/DIAG IV INF INIT: CPT | Mod: PN

## 2023-03-06 PROCEDURE — 63600175 PHARM REV CODE 636 W HCPCS: Mod: PN | Performed by: INTERNAL MEDICINE

## 2023-03-06 RX ORDER — SODIUM CHLORIDE 0.9 % (FLUSH) 0.9 %
10 SYRINGE (ML) INJECTION
Status: DISCONTINUED | OUTPATIENT
Start: 2023-03-06 | End: 2023-03-06 | Stop reason: HOSPADM

## 2023-03-06 RX ADMIN — Medication 10 ML: at 03:03

## 2023-03-06 RX ADMIN — SODIUM CHLORIDE: 9 INJECTION, SOLUTION INTRAVENOUS at 01:03

## 2023-03-06 RX ADMIN — IRON SUCROSE 200 MG: 20 INJECTION, SOLUTION INTRAVENOUS at 01:03

## 2023-03-06 NOTE — PLAN OF CARE
Problem: Adult Inpatient Plan of Care  Goal: Plan of Care Review  Outcome: Ongoing, Progressing  Flowsheets (Taken 3/6/2023 1540)  Plan of Care Reviewed With:   patient   daughter  Goal: Patient-Specific Goal (Individualized)  Outcome: Ongoing, Progressing  Flowsheets (Taken 3/6/2023 1540)  Anxieties, Fears or Concerns: very fatigue  Individualized Care Needs: education, conversation, sleep, daughter at chairside, blanket, pillow, wheelchair   Pt arrived to clinic for Venofer infusion and tolerated well with no changes throughout therapy, with being monitored 30 min post infusion. Pt aware of side effects and f/u appts and discharged to home in NAD in wheelchair with daughter.

## 2023-03-06 NOTE — TELEPHONE ENCOUNTER
----- Message from Jelena Ivory sent at 3/3/2023  4:58 PM CST -----  Type: Need Medical Advice   Who Called: daughter of patient  Varun callback number: [phone]  Additional Information: Daughter called to schedule the above patient infusion  Please call to further assist, Thanks

## 2023-03-07 RX ORDER — SODIUM CHLORIDE 0.9 % (FLUSH) 0.9 %
10 SYRINGE (ML) INJECTION
Status: CANCELLED | OUTPATIENT
Start: 2023-03-20

## 2023-03-07 RX ORDER — SODIUM CHLORIDE 0.9 % (FLUSH) 0.9 %
10 SYRINGE (ML) INJECTION
Status: CANCELLED | OUTPATIENT
Start: 2023-03-13

## 2023-03-07 RX ORDER — SODIUM CHLORIDE 0.9 % (FLUSH) 0.9 %
10 SYRINGE (ML) INJECTION
Status: CANCELLED | OUTPATIENT
Start: 2023-05-08

## 2023-03-07 RX ORDER — SODIUM CHLORIDE 0.9 % (FLUSH) 0.9 %
10 SYRINGE (ML) INJECTION
Status: CANCELLED | OUTPATIENT
Start: 2023-04-03

## 2023-03-07 RX ORDER — HEPARIN 100 UNIT/ML
500 SYRINGE INTRAVENOUS
Status: CANCELLED | OUTPATIENT
Start: 2023-05-08

## 2023-03-07 RX ORDER — HEPARIN 100 UNIT/ML
500 SYRINGE INTRAVENOUS
Status: CANCELLED | OUTPATIENT
Start: 2023-03-20

## 2023-03-07 RX ORDER — HEPARIN 100 UNIT/ML
500 SYRINGE INTRAVENOUS
Status: CANCELLED | OUTPATIENT
Start: 2023-03-13

## 2023-03-07 RX ORDER — HEPARIN 100 UNIT/ML
500 SYRINGE INTRAVENOUS
Status: CANCELLED | OUTPATIENT
Start: 2023-04-03

## 2023-03-13 ENCOUNTER — INFUSION (OUTPATIENT)
Dept: INFUSION THERAPY | Facility: HOSPITAL | Age: 63
End: 2023-03-13
Attending: INTERNAL MEDICINE
Payer: MEDICARE

## 2023-03-13 VITALS
HEART RATE: 76 BPM | WEIGHT: 203.06 LBS | DIASTOLIC BLOOD PRESSURE: 72 MMHG | TEMPERATURE: 97 F | HEIGHT: 61 IN | SYSTOLIC BLOOD PRESSURE: 135 MMHG | RESPIRATION RATE: 16 BRPM | BODY MASS INDEX: 38.34 KG/M2

## 2023-03-13 DIAGNOSIS — D50.0 IRON DEFICIENCY ANEMIA DUE TO CHRONIC BLOOD LOSS: ICD-10-CM

## 2023-03-13 DIAGNOSIS — D64.9 SYMPTOMATIC ANEMIA: Primary | ICD-10-CM

## 2023-03-13 PROCEDURE — 25000003 PHARM REV CODE 250: Mod: PN | Performed by: INTERNAL MEDICINE

## 2023-03-13 PROCEDURE — 63600175 PHARM REV CODE 636 W HCPCS: Mod: PN | Performed by: INTERNAL MEDICINE

## 2023-03-13 PROCEDURE — 96365 THER/PROPH/DIAG IV INF INIT: CPT | Mod: PN

## 2023-03-13 RX ORDER — HEPARIN 100 UNIT/ML
500 SYRINGE INTRAVENOUS
Status: DISCONTINUED | OUTPATIENT
Start: 2023-03-13 | End: 2023-03-13 | Stop reason: HOSPADM

## 2023-03-13 RX ORDER — SODIUM CHLORIDE 0.9 % (FLUSH) 0.9 %
10 SYRINGE (ML) INJECTION
Status: DISCONTINUED | OUTPATIENT
Start: 2023-03-13 | End: 2023-03-13 | Stop reason: HOSPADM

## 2023-03-13 RX ADMIN — IRON SUCROSE 200 MG: 20 INJECTION, SOLUTION INTRAVENOUS at 09:03

## 2023-03-13 RX ADMIN — SODIUM CHLORIDE: 9 INJECTION, SOLUTION INTRAVENOUS at 09:03

## 2023-03-13 NOTE — PLAN OF CARE
Problem: Adult Inpatient Plan of Care  Goal: Plan of Care Review  Outcome: Ongoing, Progressing  Flowsheets (Taken 3/13/2023 1128)  Plan of Care Reviewed With:   patient   daughter  Goal: Patient-Specific Goal (Individualized)  Outcome: Ongoing, Progressing  Flowsheets (Taken 3/13/2023 1214)  Anxieties, Fears or Concerns: sleepy  Individualized Care Needs: recliner, warm blanket, pillow, daughter at chairside.     Problem: Fatigue  Goal: Improved Activity Tolerance  Outcome: Ongoing, Progressing  Intervention: Promote Improved Energy  Flowsheets (Taken 3/13/2023 1000)  Fatigue Management:   activity schedule adjusted   paced activity encouraged   frequent rest breaks encouraged  Sleep/Rest Enhancement:   natural light exposure provided   noise level reduced   reading promoted   family presence promoted   regular sleep/rest pattern promoted   room darkened  Activity Management:   Ambulated -L4   Ambulated to bathroom - L4   Up in stretcher chair - L1      Pt tolerated Venofer well today. Monitored for 30 min after infusions. NAD/no concerns voiced. Reviewed follow-up appointments. All questions were answered, daughter pushed mom in wheelchair down for discharge.

## 2023-03-20 ENCOUNTER — INFUSION (OUTPATIENT)
Dept: INFUSION THERAPY | Facility: HOSPITAL | Age: 63
End: 2023-03-20
Attending: INTERNAL MEDICINE
Payer: MEDICARE

## 2023-03-20 VITALS
DIASTOLIC BLOOD PRESSURE: 71 MMHG | TEMPERATURE: 98 F | HEART RATE: 74 BPM | BODY MASS INDEX: 40.08 KG/M2 | HEIGHT: 61 IN | RESPIRATION RATE: 16 BRPM | SYSTOLIC BLOOD PRESSURE: 137 MMHG | WEIGHT: 212.31 LBS

## 2023-03-20 DIAGNOSIS — D50.0 IRON DEFICIENCY ANEMIA DUE TO CHRONIC BLOOD LOSS: ICD-10-CM

## 2023-03-20 DIAGNOSIS — D64.9 SYMPTOMATIC ANEMIA: Primary | ICD-10-CM

## 2023-03-20 PROCEDURE — 63600175 PHARM REV CODE 636 W HCPCS: Mod: PN | Performed by: INTERNAL MEDICINE

## 2023-03-20 PROCEDURE — 96365 THER/PROPH/DIAG IV INF INIT: CPT | Mod: PN

## 2023-03-20 PROCEDURE — 25000003 PHARM REV CODE 250: Mod: PN | Performed by: INTERNAL MEDICINE

## 2023-03-20 RX ORDER — SODIUM CHLORIDE 0.9 % (FLUSH) 0.9 %
10 SYRINGE (ML) INJECTION
Status: DISCONTINUED | OUTPATIENT
Start: 2023-03-20 | End: 2023-03-20 | Stop reason: HOSPADM

## 2023-03-20 RX ADMIN — IRON SUCROSE 200 MG: 20 INJECTION, SOLUTION INTRAVENOUS at 09:03

## 2023-03-20 RX ADMIN — SODIUM CHLORIDE: 9 INJECTION, SOLUTION INTRAVENOUS at 09:03

## 2023-03-20 NOTE — PLAN OF CARE
Problem: Adult Inpatient Plan of Care  Goal: Plan of Care Review  Outcome: Ongoing, Progressing  Goal: Patient-Specific Goal (Individualized)  Outcome: Ongoing, Progressing  Flowsheets (Taken 3/20/2023 1001)  Anxieties, Fears or Concerns: None  Individualized Care Needs: Recliner, warm blanket, conversation     Problem: Fatigue  Goal: Improved Activity Tolerance  Outcome: Ongoing, Progressing  Intervention: Promote Improved Energy  Flowsheets (Taken 3/20/2023 1001)  Fatigue Management:   frequent rest breaks encouraged   paced activity encouraged  Sleep/Rest Enhancement: regular sleep/rest pattern promoted  Activity Management: Walk with assistive devise and /or staff member - L3       Patient to Infusion for Venofer. Treatment plan reviewed with patient. VSS. Tolerated infusion. Provided with copy of upcoming appointment schedule. Escorted to the front lobby for discharge to home.

## 2023-03-30 DIAGNOSIS — M25.561 RIGHT KNEE PAIN: Primary | ICD-10-CM

## 2023-03-31 ENCOUNTER — TELEPHONE (OUTPATIENT)
Dept: HEMATOLOGY/ONCOLOGY | Facility: CLINIC | Age: 63
End: 2023-03-31
Payer: MEDICARE

## 2023-03-31 NOTE — TELEPHONE ENCOUNTER
Spoke with the pt and got the pt rescheduled. Pt verbalized and understanding.  ----- Message from Lupe Willis sent at 3/31/2023  8:36 AM CDT -----  Regarding: Dr Diamond  Type:Needs Medical Advice    Who Called:PT  Best call back number:[phone]  Additional Info: Requesting a call back regarding Pt needs to reschedule her appt that is scheduled for 5/1. She can not make it at that time, she has another appt, can she reschedule to around lunchtime?  Please Advise- Thank you

## 2023-04-03 ENCOUNTER — OFFICE VISIT (OUTPATIENT)
Dept: ENDOCRINOLOGY | Facility: CLINIC | Age: 63
End: 2023-04-03
Payer: MEDICARE

## 2023-04-03 ENCOUNTER — HOSPITAL ENCOUNTER (OUTPATIENT)
Dept: RADIOLOGY | Facility: HOSPITAL | Age: 63
Discharge: HOME | End: 2023-04-03
Attending: ORTHOPAEDIC SURGERY
Payer: MEDICARE

## 2023-04-03 ENCOUNTER — TELEPHONE (OUTPATIENT)
Dept: PRIMARY CARE CLINIC | Facility: CLINIC | Age: 63
End: 2023-04-03
Payer: MEDICARE

## 2023-04-03 ENCOUNTER — OFFICE VISIT (OUTPATIENT)
Dept: ORTHOPEDICS | Facility: CLINIC | Age: 63
End: 2023-04-03
Payer: MEDICARE

## 2023-04-03 ENCOUNTER — INFUSION (OUTPATIENT)
Dept: INFUSION THERAPY | Facility: HOSPITAL | Age: 63
End: 2023-04-03
Attending: INTERNAL MEDICINE
Payer: MEDICARE

## 2023-04-03 VITALS
DIASTOLIC BLOOD PRESSURE: 70 MMHG | SYSTOLIC BLOOD PRESSURE: 125 MMHG | RESPIRATION RATE: 16 BRPM | TEMPERATURE: 98 F | HEIGHT: 61 IN | HEART RATE: 80 BPM | BODY MASS INDEX: 40.63 KG/M2 | WEIGHT: 215.19 LBS

## 2023-04-03 VITALS
HEIGHT: 61 IN | SYSTOLIC BLOOD PRESSURE: 122 MMHG | DIASTOLIC BLOOD PRESSURE: 60 MMHG | HEART RATE: 84 BPM | WEIGHT: 215.25 LBS | BODY MASS INDEX: 40.64 KG/M2 | OXYGEN SATURATION: 95 %

## 2023-04-03 VITALS — BODY MASS INDEX: 40.59 KG/M2 | HEIGHT: 61 IN | WEIGHT: 215 LBS

## 2023-04-03 DIAGNOSIS — E11.9 TYPE 2 DIABETES MELLITUS WITHOUT COMPLICATION, WITH LONG-TERM CURRENT USE OF INSULIN: ICD-10-CM

## 2023-04-03 DIAGNOSIS — Z79.4 TYPE 2 DIABETES MELLITUS WITH MICROALBUMINURIA, WITH LONG-TERM CURRENT USE OF INSULIN: ICD-10-CM

## 2023-04-03 DIAGNOSIS — E11.29 TYPE 2 DIABETES MELLITUS WITH MICROALBUMINURIA, WITH LONG-TERM CURRENT USE OF INSULIN: ICD-10-CM

## 2023-04-03 DIAGNOSIS — E11.00 DM (DIABETES MELLITUS), TYPE 2, UNCONTROLLED, WITH HYPEROSMOLARITY: ICD-10-CM

## 2023-04-03 DIAGNOSIS — M25.561 RIGHT KNEE PAIN: ICD-10-CM

## 2023-04-03 DIAGNOSIS — D50.0 IRON DEFICIENCY ANEMIA DUE TO CHRONIC BLOOD LOSS: ICD-10-CM

## 2023-04-03 DIAGNOSIS — Z79.4 TYPE 2 DIABETES MELLITUS WITHOUT COMPLICATION, WITH LONG-TERM CURRENT USE OF INSULIN: ICD-10-CM

## 2023-04-03 DIAGNOSIS — R80.9 TYPE 2 DIABETES MELLITUS WITH MICROALBUMINURIA, WITH LONG-TERM CURRENT USE OF INSULIN: ICD-10-CM

## 2023-04-03 DIAGNOSIS — Z79.4 TYPE 2 DIABETES MELLITUS WITH DIABETIC POLYNEUROPATHY, WITH LONG-TERM CURRENT USE OF INSULIN: ICD-10-CM

## 2023-04-03 DIAGNOSIS — E11.42 TYPE 2 DIABETES MELLITUS WITH DIABETIC POLYNEUROPATHY, WITH LONG-TERM CURRENT USE OF INSULIN: ICD-10-CM

## 2023-04-03 DIAGNOSIS — D64.9 SYMPTOMATIC ANEMIA: Primary | ICD-10-CM

## 2023-04-03 DIAGNOSIS — M25.561 RIGHT KNEE PAIN: Primary | ICD-10-CM

## 2023-04-03 DIAGNOSIS — E11.65 TYPE 2 DIABETES MELLITUS WITH HYPERGLYCEMIA, WITH LONG-TERM CURRENT USE OF INSULIN: Primary | ICD-10-CM

## 2023-04-03 DIAGNOSIS — Z79.4 TYPE 2 DIABETES MELLITUS WITH HYPERGLYCEMIA, WITH LONG-TERM CURRENT USE OF INSULIN: Primary | ICD-10-CM

## 2023-04-03 DIAGNOSIS — M17.11 OSTEOARTHRITIS OF RIGHT KNEE, UNSPECIFIED OSTEOARTHRITIS TYPE: ICD-10-CM

## 2023-04-03 PROCEDURE — 3066F PR DOCUMENTATION OF TREATMENT FOR NEPHROPATHY: ICD-10-PCS | Mod: CPTII,S$GLB,, | Performed by: NURSE PRACTITIONER

## 2023-04-03 PROCEDURE — 4010F ACE/ARB THERAPY RXD/TAKEN: CPT | Mod: CPTII,S$GLB,, | Performed by: NURSE PRACTITIONER

## 2023-04-03 PROCEDURE — 3008F BODY MASS INDEX DOCD: CPT | Mod: CPTII,S$GLB,, | Performed by: NURSE PRACTITIONER

## 2023-04-03 PROCEDURE — 20610 LARGE JOINT ASPIRATION/INJECTION: R KNEE: ICD-10-PCS | Mod: RT,S$GLB,, | Performed by: ORTHOPAEDIC SURGERY

## 2023-04-03 PROCEDURE — 3074F PR MOST RECENT SYSTOLIC BLOOD PRESSURE < 130 MM HG: ICD-10-PCS | Mod: CPTII,S$GLB,, | Performed by: NURSE PRACTITIONER

## 2023-04-03 PROCEDURE — 73560 X-RAY EXAM OF KNEE 1 OR 2: CPT | Mod: 26,LT,, | Performed by: RADIOLOGY

## 2023-04-03 PROCEDURE — 3060F PR POS MICROALBUMINURIA RESULT DOCUMENTED/REVIEW: ICD-10-PCS | Mod: CPTII,S$GLB,, | Performed by: ORTHOPAEDIC SURGERY

## 2023-04-03 PROCEDURE — 3046F PR MOST RECENT HEMOGLOBIN A1C LEVEL > 9.0%: ICD-10-PCS | Mod: CPTII,S$GLB,, | Performed by: ORTHOPAEDIC SURGERY

## 2023-04-03 PROCEDURE — 96365 THER/PROPH/DIAG IV INF INIT: CPT | Mod: PN

## 2023-04-03 PROCEDURE — 3066F PR DOCUMENTATION OF TREATMENT FOR NEPHROPATHY: ICD-10-PCS | Mod: CPTII,S$GLB,, | Performed by: ORTHOPAEDIC SURGERY

## 2023-04-03 PROCEDURE — 3078F PR MOST RECENT DIASTOLIC BLOOD PRESSURE < 80 MM HG: ICD-10-PCS | Mod: CPTII,S$GLB,, | Performed by: NURSE PRACTITIONER

## 2023-04-03 PROCEDURE — 99214 PR OFFICE/OUTPT VISIT, EST, LEVL IV, 30-39 MIN: ICD-10-PCS | Mod: 25,S$GLB,, | Performed by: ORTHOPAEDIC SURGERY

## 2023-04-03 PROCEDURE — 4010F PR ACE/ARB THEARPY RXD/TAKEN: ICD-10-PCS | Mod: CPTII,S$GLB,, | Performed by: NURSE PRACTITIONER

## 2023-04-03 PROCEDURE — 3008F BODY MASS INDEX DOCD: CPT | Mod: CPTII,S$GLB,, | Performed by: ORTHOPAEDIC SURGERY

## 2023-04-03 PROCEDURE — 3074F SYST BP LT 130 MM HG: CPT | Mod: CPTII,S$GLB,, | Performed by: NURSE PRACTITIONER

## 2023-04-03 PROCEDURE — 25000003 PHARM REV CODE 250: Mod: PN | Performed by: INTERNAL MEDICINE

## 2023-04-03 PROCEDURE — 3060F POS MICROALBUMINURIA REV: CPT | Mod: CPTII,S$GLB,, | Performed by: ORTHOPAEDIC SURGERY

## 2023-04-03 PROCEDURE — 99999 PR PBB SHADOW E&M-EST. PATIENT-LVL V: CPT | Mod: PBBFAC,,, | Performed by: NURSE PRACTITIONER

## 2023-04-03 PROCEDURE — 73560 XR KNEE ORTHO RIGHT: ICD-10-PCS | Mod: 26,LT,, | Performed by: RADIOLOGY

## 2023-04-03 PROCEDURE — 1159F PR MEDICATION LIST DOCUMENTED IN MEDICAL RECORD: ICD-10-PCS | Mod: CPTII,S$GLB,, | Performed by: ORTHOPAEDIC SURGERY

## 2023-04-03 PROCEDURE — 20610 DRAIN/INJ JOINT/BURSA W/O US: CPT | Mod: RT,S$GLB,, | Performed by: ORTHOPAEDIC SURGERY

## 2023-04-03 PROCEDURE — 3066F NEPHROPATHY DOC TX: CPT | Mod: CPTII,S$GLB,, | Performed by: NURSE PRACTITIONER

## 2023-04-03 PROCEDURE — 4010F ACE/ARB THERAPY RXD/TAKEN: CPT | Mod: CPTII,S$GLB,, | Performed by: ORTHOPAEDIC SURGERY

## 2023-04-03 PROCEDURE — 3078F DIAST BP <80 MM HG: CPT | Mod: CPTII,S$GLB,, | Performed by: NURSE PRACTITIONER

## 2023-04-03 PROCEDURE — 73560 X-RAY EXAM OF KNEE 1 OR 2: CPT | Mod: TC,25,PO,LT

## 2023-04-03 PROCEDURE — 3066F NEPHROPATHY DOC TX: CPT | Mod: CPTII,S$GLB,, | Performed by: ORTHOPAEDIC SURGERY

## 2023-04-03 PROCEDURE — 99999 PR PBB SHADOW E&M-EST. PATIENT-LVL V: ICD-10-PCS | Mod: PBBFAC,,, | Performed by: NURSE PRACTITIONER

## 2023-04-03 PROCEDURE — 99214 OFFICE O/P EST MOD 30 MIN: CPT | Mod: S$GLB,,, | Performed by: NURSE PRACTITIONER

## 2023-04-03 PROCEDURE — 1159F PR MEDICATION LIST DOCUMENTED IN MEDICAL RECORD: ICD-10-PCS | Mod: CPTII,S$GLB,, | Performed by: NURSE PRACTITIONER

## 2023-04-03 PROCEDURE — 63600175 PHARM REV CODE 636 W HCPCS: Mod: PN | Performed by: INTERNAL MEDICINE

## 2023-04-03 PROCEDURE — 99999 PR PBB SHADOW E&M-EST. PATIENT-LVL IV: ICD-10-PCS | Mod: PBBFAC,,, | Performed by: ORTHOPAEDIC SURGERY

## 2023-04-03 PROCEDURE — 3046F HEMOGLOBIN A1C LEVEL >9.0%: CPT | Mod: CPTII,S$GLB,, | Performed by: NURSE PRACTITIONER

## 2023-04-03 PROCEDURE — 1160F PR REVIEW ALL MEDS BY PRESCRIBER/CLIN PHARMACIST DOCUMENTED: ICD-10-PCS | Mod: CPTII,S$GLB,, | Performed by: ORTHOPAEDIC SURGERY

## 2023-04-03 PROCEDURE — 3060F PR POS MICROALBUMINURIA RESULT DOCUMENTED/REVIEW: ICD-10-PCS | Mod: CPTII,S$GLB,, | Performed by: NURSE PRACTITIONER

## 2023-04-03 PROCEDURE — 1159F MED LIST DOCD IN RCRD: CPT | Mod: CPTII,S$GLB,, | Performed by: ORTHOPAEDIC SURGERY

## 2023-04-03 PROCEDURE — 3046F PR MOST RECENT HEMOGLOBIN A1C LEVEL > 9.0%: ICD-10-PCS | Mod: CPTII,S$GLB,, | Performed by: NURSE PRACTITIONER

## 2023-04-03 PROCEDURE — 99214 OFFICE O/P EST MOD 30 MIN: CPT | Mod: 25,S$GLB,, | Performed by: ORTHOPAEDIC SURGERY

## 2023-04-03 PROCEDURE — 3008F PR BODY MASS INDEX (BMI) DOCUMENTED: ICD-10-PCS | Mod: CPTII,S$GLB,, | Performed by: ORTHOPAEDIC SURGERY

## 2023-04-03 PROCEDURE — 99214 PR OFFICE/OUTPT VISIT, EST, LEVL IV, 30-39 MIN: ICD-10-PCS | Mod: S$GLB,,, | Performed by: NURSE PRACTITIONER

## 2023-04-03 PROCEDURE — 73562 XR KNEE ORTHO RIGHT: ICD-10-PCS | Mod: 26,RT,, | Performed by: RADIOLOGY

## 2023-04-03 PROCEDURE — 3046F HEMOGLOBIN A1C LEVEL >9.0%: CPT | Mod: CPTII,S$GLB,, | Performed by: ORTHOPAEDIC SURGERY

## 2023-04-03 PROCEDURE — 4010F PR ACE/ARB THEARPY RXD/TAKEN: ICD-10-PCS | Mod: CPTII,S$GLB,, | Performed by: ORTHOPAEDIC SURGERY

## 2023-04-03 PROCEDURE — 1159F MED LIST DOCD IN RCRD: CPT | Mod: CPTII,S$GLB,, | Performed by: NURSE PRACTITIONER

## 2023-04-03 PROCEDURE — 73562 X-RAY EXAM OF KNEE 3: CPT | Mod: 26,RT,, | Performed by: RADIOLOGY

## 2023-04-03 PROCEDURE — 3008F PR BODY MASS INDEX (BMI) DOCUMENTED: ICD-10-PCS | Mod: CPTII,S$GLB,, | Performed by: NURSE PRACTITIONER

## 2023-04-03 PROCEDURE — 1160F RVW MEDS BY RX/DR IN RCRD: CPT | Mod: CPTII,S$GLB,, | Performed by: ORTHOPAEDIC SURGERY

## 2023-04-03 PROCEDURE — 3060F POS MICROALBUMINURIA REV: CPT | Mod: CPTII,S$GLB,, | Performed by: NURSE PRACTITIONER

## 2023-04-03 PROCEDURE — 99999 PR PBB SHADOW E&M-EST. PATIENT-LVL IV: CPT | Mod: PBBFAC,,, | Performed by: ORTHOPAEDIC SURGERY

## 2023-04-03 RX ORDER — SEMAGLUTIDE 1.34 MG/ML
INJECTION, SOLUTION SUBCUTANEOUS
Qty: 1.5 ML | Refills: 11 | Status: SHIPPED | OUTPATIENT
Start: 2023-04-03 | End: 2023-06-05

## 2023-04-03 RX ORDER — INSULIN GLARGINE 100 [IU]/ML
45 INJECTION, SOLUTION SUBCUTANEOUS NIGHTLY
Qty: 5 EACH | Refills: 15 | Status: SHIPPED | OUTPATIENT
Start: 2023-04-03 | End: 2023-06-05

## 2023-04-03 RX ORDER — INSULIN ASPART 100 [IU]/ML
INJECTION, SOLUTION INTRAVENOUS; SUBCUTANEOUS
Qty: 6 EACH | Refills: 12 | Status: SHIPPED | OUTPATIENT
Start: 2023-04-03 | End: 2023-09-07 | Stop reason: SDUPTHER

## 2023-04-03 RX ORDER — SODIUM CHLORIDE 0.9 % (FLUSH) 0.9 %
10 SYRINGE (ML) INJECTION
Status: DISCONTINUED | OUTPATIENT
Start: 2023-04-03 | End: 2023-04-03 | Stop reason: HOSPADM

## 2023-04-03 RX ORDER — PEN NEEDLE, DIABETIC 30 GX3/16"
NEEDLE, DISPOSABLE MISCELLANEOUS
Qty: 150 EACH | Refills: 12 | Status: SHIPPED | OUTPATIENT
Start: 2023-04-03

## 2023-04-03 RX ORDER — TRIAMCINOLONE ACETONIDE 40 MG/ML
40 INJECTION, SUSPENSION INTRA-ARTICULAR; INTRAMUSCULAR
Status: DISCONTINUED | OUTPATIENT
Start: 2023-04-03 | End: 2023-04-03 | Stop reason: HOSPADM

## 2023-04-03 RX ADMIN — SODIUM CHLORIDE: 9 INJECTION, SOLUTION INTRAVENOUS at 10:04

## 2023-04-03 RX ADMIN — IRON SUCROSE 200 MG: 20 INJECTION, SOLUTION INTRAVENOUS at 10:04

## 2023-04-03 RX ADMIN — TRIAMCINOLONE ACETONIDE 40 MG: 40 INJECTION, SUSPENSION INTRA-ARTICULAR; INTRAMUSCULAR at 01:04

## 2023-04-03 NOTE — PLAN OF CARE
Problem: Adult Inpatient Plan of Care  Goal: Plan of Care Review  Outcome: Ongoing, Progressing  Flowsheets (Taken 4/3/2023 1019)  Plan of Care Reviewed With: patient  Goal: Patient-Specific Goal (Individualized)  Outcome: Ongoing, Progressing  Flowsheets (Taken 4/3/2023 1019)  Anxieties, Fears or Concerns: None  Individualized Care Needs: Recliner, warm blanket, conversation     Problem: Fatigue  Goal: Improved Activity Tolerance  Outcome: Ongoing, Progressing  Intervention: Promote Improved Energy  Flowsheets (Taken 4/3/2023 1019)  Fatigue Management:   frequent rest breaks encouraged   paced activity encouraged  Sleep/Rest Enhancement: regular sleep/rest pattern promoted  Activity Management: Walk with assistive devise and /or staff member - L3    Patient to Infusion for Venofer. Using a w/c. Treatment plan reviewed with patient. VSS. Tolerated infusion. Provided with copy of upcoming appointment schedule. Escorted to the front lobby for discharge to home.

## 2023-04-03 NOTE — PROCEDURES
Large Joint Aspiration/Injection: R knee    Date/Time: 4/3/2023 1:30 PM  Performed by: Sumit Gil MD  Authorized by: Sumit Gil MD     Consent Done?:  Yes (Verbal)  Timeout: prior to procedure the correct patient, procedure, and site was verified    Prep: patient was prepped and draped in usual sterile fashion      Details:  Needle Size:  21 G  Approach:  Anterolateral  Location:  Knee  Site:  R knee  Medications:  40 mg triamcinolone acetonide 40 mg/mL  Patient tolerance:  Patient tolerated the procedure well with no immediate complications

## 2023-04-03 NOTE — PROGRESS NOTES
62 years old right knee pain for year and a half off and on come on gradually throbbing aching pain which is 3 on good days 8 on bad days.  She has been using oral medications with partial relief.  Difficulty going to stand up after she has been seated.  Prolonged walking is painful.  Patient reports history of lumbar spine surgery in 2021 with subsequent infection requiring repeat surgery.  Describes that she has nerve damage to her legs with burning in her legs    Exam shows tenderness the joint line no signs infection instability extensor mechanism is intact well-perfused distally    X-rays show arthritic changes     Assessment: Right knee arthrosis    Plan: Kenalog injection right knee, encourage strengthening and weight loss over time, follow-up as needed    Imaging studies ordered and reviewed by me    Further History  Aching pain  Worse with activity  Relieved with rest  No other associated symptoms  No other radiation    Further Exam  Alert and oriented  Pleasant  Contralateral limb has appropriate range of motion for age and condition  Contralateral limb has appropriate strength for age and condition  Contralateral limb has appropriate stability  for age and condition  No adenopathy  Pulses are appropriate for current condition  Skin is intact        Chief Complaint    Chief Complaint   Patient presents with    Right Knee - Pain       HPI  Amanda Barton is a 62 y.o.  female who presents with       Past Medical History  Past Medical History:   Diagnosis Date    Acute bacterial pericarditis 12/02/2017    Anemia     Bacteremia due to methicillin resistant Staphylococcus aureus 11/20/2017    Diabetes mellitus     Encounter for blood transfusion     GERD (gastroesophageal reflux disease)     Heart murmur     History of pneumonia     History of UTI     Hypertension     Migraine headache     Sleep apnea     no machine yet    Type 2 diabetes mellitus without complication, with long-term current use of insulin  2017       Past Surgical History  Past Surgical History:   Procedure Laterality Date    APPENDECTOMY      CATHETERIZATION OF BOTH LEFT AND RIGHT HEART N/A 2023    Procedure: Right and Left Heart Cath;  Surgeon: Osman Williamson MD;  Location: [facility] CATH;  Service: Cardiology;  Laterality: N/A;     SECTION      CHOLECYSTECTOMY      COLONOSCOPY N/A 2017    Procedure: COLONOSCOPY;  Surgeon: Juan Lepe MD;  Location: [facility] ENDO;  Service: Endoscopy;  Laterality: N/A;    ESOPHAGOGASTRODUODENOSCOPY N/A 2019    Procedure: EGD (ESOPHAGOGASTRODUODENOSCOPY);  Surgeon: Gustavo Austin MD;  Location: [facility] ENDO;  Service: Endoscopy;  Laterality: N/A;  with Push Enteroscopy    ESOPHAGOGASTRODUODENOSCOPY N/A 2019    Procedure: EGD (ESOPHAGOGASTRODUODENOSCOPY);  Surgeon: Gustavo Austin MD;  Location: [facility] ENDO;  Service: Endoscopy;  Laterality: N/A;  Push enteroscopy    HYSTERECTOMY      IRRIGATION AND DEBRIDEMENT OF LUMBAR SPINE  2022    LUMBAR FUSION  2021    L4-L5       Medications  Current Outpatient Medications   Medication Sig    ALPRAZolam (XANAX) 1 MG tablet Take 1 tablet (1 mg total) by mouth 2 (two) times daily.    blood sugar diagnostic Strp     carvediloL (COREG) 3.125 MG tablet Take 3.125 mg by mouth 2 (two) times daily with meals.    desloratadine (CLARINEX) 5 mg tablet Take 1 tablet (5 mg total) by mouth once daily.    doxepin (SINEQUAN) 50 MG capsule TAKE ONE CAPSULE BY MOUTH NIGHTLY    DULoxetine (CYMBALTA) 60 MG capsule Take 60 mg by mouth once daily.     ergocalciferol (ERGOCALCIFEROL) 50,000 unit Cap Take 50,000 Units by mouth every 7 days.    furosemide (LASIX) 40 MG tablet Take 40 mg by mouth 2 (two) times daily.    gabapentin (NEURONTIN) 800 MG tablet Take 800 mg by mouth 3 (three) times daily.     ibuprofen (ADVIL,MOTRIN) 800 MG tablet Take 800 mg by mouth 3 (three) times daily as needed. As needed    insulin (LANTUS SOLOSTAR U-100 INSULIN) glargine  "100 units/mL SubQ pen Inject 45 Units into the skin every evening.    insulin aspart U-100 (NOVOLOG FLEXPEN U-100 INSULIN) 100 unit/mL (3 mL) InPn pen 8 u Ac + correction  Max TDD 54u    losartan (COZAAR) 50 MG tablet Take 50 mg by mouth once daily.    metFORMIN (GLUCOPHAGE) 1000 MG tablet Take 1 tablet by mouth 2 (two) times daily.    montelukast (SINGULAIR) 10 mg tablet TAKE 1 TABLET (10 MG TOTAL) BY MOUTH EVERY EVENING.    oxybutynin (DITROPAN-XL) 10 MG 24 hr tablet Take 1 tablet (10 mg total) by mouth once daily.    oxyCODONE-acetaminophen (PERCOCET) 5-325 mg per tablet Take 1 tablet by mouth every 4 to 6 hours as needed for Pain.    pantoprazole (PROTONIX) 40 MG tablet Take 1 tablet (40 mg total) by mouth once daily.    pen needle, diabetic (BD KARYN 2ND GEN PEN NEEDLE) 32 gauge x 5/32" Ndle Checks bg 4 times a day    pregabalin (LYRICA) 150 MG capsule Take 150 mg by mouth 3 (three) times daily.    promethazine (PHENERGAN) 25 MG tablet Take 25 mg by mouth before meals as needed.    rOPINIRole (REQUIP) 2 MG tablet Take 2 mg by mouth 2 (two) times daily.    rosuvastatin (CRESTOR) 20 MG tablet Take 20 mg by mouth every evening.    semaglutide (OZEMPIC) 0.25 mg or 0.5 mg(2 mg/1.5 mL) pen injector Start Ozempic 0.25 mg once weekly x 4 weeks. At week 5 increase to 0.5 mg weekly    spironolactone (ALDACTONE) 50 MG tablet Take 50 mg by mouth once daily.    SYMBICORT 80-4.5 mcg/actuation HFAA INHALE 2 PUFFS INTO THE LUNGS TWICE DAILY. **CONTROLLER**    temazepam (RESTORIL) 30 mg capsule Take 30 mg by mouth nightly as needed for Insomnia.    tiZANidine (ZANAFLEX) 4 MG tablet Take 4 mg by mouth 3 (three) times daily. Take three times daily     No current facility-administered medications for this visit.     Facility-Administered Medications Ordered in Other Visits   Medication    sodium chloride 0.9% flush 10 mL       Allergies  Review of patient's allergies indicates:  No Known Allergies    Family History  Family History "   Problem Relation Age of Onset    No Known Problems Mother     Heart disease Father     Heart failure Father     Arrhythmia Father        Social History  Social History     Socioeconomic History    Marital status:    Tobacco Use    Smoking status: Never     Passive exposure: Never    Smokeless tobacco: Never   Substance and Sexual Activity    Alcohol use: No               Review of Systems     Constitutional: Negative    HENT: Negative  Eyes: Negative  Respiratory: Negative  Cardiovascular: Negative  Musculoskeletal: HPI  Skin: Negative  Neurological: Negative  Hematological: Negative  Endocrine: Negative                 Physical Exam    There were no vitals filed for this visit.  Body mass index is 40.62 kg/m².  Physical Examination:     General appearance -  well appearing, and in no distress  Mental status - awake  Neck - supple  Chest -  symmetric air entry  Heart - normal rate   Abdomen - soft      Assessment     1. Right knee pain    2. Osteoarthritis of right knee, unspecified osteoarthritis type          Plan

## 2023-04-03 NOTE — TELEPHONE ENCOUNTER
----- Message from Leonela Gomes sent at 4/3/2023  5:03 PM CDT -----  Type:  Sooner Apoointment Request    Caller is requesting a sooner appointment.  Caller declined first available appointment listed below.  Caller will not accept being placed on the waitlist and is requesting a message be sent to doctor.  Name of Caller:pt  When is the first available appointment?books are closed  Symptoms:  Would the patient rather a call back or a response via [facility]? call  Best Call Back Number:[phone]  Additional Information:

## 2023-04-03 NOTE — PROGRESS NOTES
CC: This 62 y.o. female presents for management of diabetes  and chronic conditions pending review including , HLP, afib, BJ, CHF, morbid obesity    HPI: She was diagnosed with T2DM in the 1990s. Has  been hospitalized r/t DM for bg in the 500s.   Family hx of DM: daughter    Arrives new to endocrine, daughter is present for the visit   Reports lots of depression in past few yrs, mom and dad passing away, stress eating  Also had a few back sxs which were complicated     hypoglycemia at home- none  monitoring BG at home:  Only checking her bg when she feels high  So last check was about 1 week- bg > 400  When bg < 400 she gets a HA    Diet: Eats 2 Meals a day, snacks- fruit, Chex Mix  Eats out ~ twice a week   Skips breakfast normally  Exercise: none- mobility limited r/t back issues   CURRENT DM MEDS: Lantus 30 u qhs, 5 u qam, metformin 1000 mg bid, Trulicity 1.5 mg weekly (Sunday), Novolog correction  Vial/pen:  Uses pens  Glucometer type:  Jocelyn contour    Standards of Care:  Eye exam: 2022 - will be seeing Dr. Raymundo    Cardiologist follows w Dr Williamson   Getting iron infusions     ROS:   Gen: Appetite good,   Resp: no SOB or LEMOS   Cardiac: No palpitations, chest pain, +2+ BLE edema   GI: No nausea or vomiting, diarrhea, constipation, or abdominal pain.  /GYN: 1+ nocturia, no burning or pain.   MS/Neuro: + numbness/ tingling in BLE;  speech clear  Psych: Denies drug/ETOH abuse, + depression.  Other systems: negative.    PE:  GENERAL: Well developed, well nourished.  PSYCH: AAOx3, appropriate mood and affect, pleasant expression, conversant, appears relaxed, well groomed.   EYES: Conjunctiva, corneas clear  NECK: Supple, trachea midline,   ABDOMEN: Soft, non-tender, non-distended   SKIN: no acanthosis nigracans.  FOOT EXAMINATION: 2/2023      Personally reviewed Past Medical, Surgical, Social History.    /60 (BP Location: Right arm, Patient Position: Sitting, BP Method: Large (Manual))   Pulse 84   Ht 5'  "1" (1.549 m)   Wt 97.7 kg (215 lb 4.5 oz)   SpO2 95%   BMI 40.68 kg/m²      Personally reviewed the below labs:      Chemistry        Component Value Date/Time     02/27/2023 1033    K 3.6 02/27/2023 1033     02/27/2023 1033    CO2 26 02/27/2023 1033    BUN 14 02/27/2023 1033    CREATININE 0.9 02/27/2023 1033     (H) 02/27/2023 1033        Component Value Date/Time    CALCIUM 8.8 02/27/2023 1033    ALKPHOS 133 02/27/2023 1033    AST 10 02/27/2023 1033    ALT 13 02/27/2023 1033    BILITOT 0.3 02/27/2023 1033    ESTGFRAFRICA >60 11/21/2019 0526    EGFRNONAA >60 11/21/2019 0526            Lab Results   Component Value Date    TSH 1.620 02/16/2023       Recent Labs   Lab 02/16/23  0753   LDL Cholesterol 86.0   HDL 46   Cholesterol 183        No results found for this or any previous visit.  No results found for this or any previous visit.    Lab Results   Component Value Date    MICALBCREAT 94.1 (H) 02/16/2023       Hemoglobin A1C   Date Value Ref Range Status   02/16/2023 10.3 (H) 4.0 - 5.6 % Final     Comment:     ADA Screening Guidelines:  5.7-6.4%  Consistent with prediabetes  >or=6.5%  Consistent with diabetes    High levels of fetal hemoglobin interfere with the HbA1C  assay. Heterozygous hemoglobin variants (HbS, HgC, etc)do  not significantly interfere with this assay.   However, presence of multiple variants may affect accuracy.     11/20/2019 6.8 (H) 0.0 - 5.6 % Final     Comment:     Reference Interval:  5.0 - 5.6 Normal   5.7 - 6.4 High Risk   > 6.5 Diabetic    Hgb A1c results are standardized based on the (NGSP) National   Glycohemoglobin Standardization Program.    Hemoglobin A1C levels are related to mean serum/plasma glucose   during the preceding 2-3 months.        02/17/2019 9.0 (H) 0.0 - 5.6 % Final     Comment:     Reference Interval:  5.0 - 5.6 Normal   5.7 - 6.4 High Risk   > 6.5 Diabetic    Hgb A1c results are standardized based on the (NGSP) National   Glycohemoglobin " Standardization Program.    Hemoglobin A1C levels are related to mean serum/plasma glucose   during the preceding 2-3 months.             ASSESSMENT and PLAN:      1. T2DM with hyperglycemia, DM PN, microalbuminuria   Change and Increase Lantus to 45 u qhs, Start Novolog 8 u AC +correction 150/25  Flip trulicity to ozempic for patient preference  Start Ozempic 0.25 mg once weekly x 4 weeks.  At week 5 increase to 0.5 mg weekly    No fmh MEN or medullary thyroid cancer (adopted)  No personal hx of pancreatitis    Continue metformin 1000 mg bid  RX for Continuous Glucose Monitor   -Recommend Dexcom  Continuous Glucose monitor                         Pt would benefit from therapeutic continuous glucose monitor to be able                         to make frequent insulin adjustments, based on current glucoses.   On arb  DM edu  F/u in 6weeks    2. HLP - on statin therapy,    3. H/o afib, CHF- follows w Dr Williamson, avoid TZD      4. Depression- referral to psychology    5. Morbid obesity- activity limited r/t limited mobility- refer to DM edu       Follow-up: in 3 months with lab prior

## 2023-04-10 ENCOUNTER — TELEPHONE (OUTPATIENT)
Dept: INFUSION THERAPY | Facility: HOSPITAL | Age: 63
End: 2023-04-10
Payer: MEDICARE

## 2023-04-10 NOTE — TELEPHONE ENCOUNTER
----- Message from Jelena Ivory sent at 4/10/2023  9:16 AM CDT -----  Type: Need Medical Advice   Who Called: Patient   Best callback number: [phone]  Additional Information: Patient called to reschedule today's appointment. I do not see an appointment scheduled for the patient today  Please call to further assist, Thanks

## 2023-04-11 ENCOUNTER — PATIENT MESSAGE (OUTPATIENT)
Dept: ADMINISTRATIVE | Facility: HOSPITAL | Age: 63
End: 2023-04-11
Payer: MEDICARE

## 2023-04-18 ENCOUNTER — PATIENT OUTREACH (OUTPATIENT)
Dept: ADMINISTRATIVE | Facility: HOSPITAL | Age: 63
End: 2023-04-18
Payer: MEDICARE

## 2023-04-18 NOTE — PROGRESS NOTES
UNCONTROLLED A1C REPORT.  PATIENT HAS AN UPCOMING LAB APPOINTMENT.  CHART REVIEWED;  LABS ORDERED AND LINKED WHAT IS NEEDED    Hemoglobin A1C   Date Value Ref Range Status   02/16/2023 10.3 (H) 4.0 - 5.6 % Final     Comment:     ADA Screening Guidelines:  5.7-6.4%  Consistent with prediabetes  >or=6.5%  Consistent with diabetes    High levels of fetal hemoglobin interfere with the HbA1C  assay. Heterozygous hemoglobin variants (HbS, HgC, etc)do  not significantly interfere with this assay.   However, presence of multiple variants may affect accuracy.     11/20/2019 6.8 (H) 0.0 - 5.6 % Final     Comment:     Reference Interval:  5.0 - 5.6 Normal   5.7 - 6.4 High Risk   > 6.5 Diabetic    Hgb A1c results are standardized based on the (NGSP) National   Glycohemoglobin Standardization Program.    Hemoglobin A1C levels are related to mean serum/plasma glucose   during the preceding 2-3 months.        02/17/2019 9.0 (H) 0.0 - 5.6 % Final     Comment:     Reference Interval:  5.0 - 5.6 Normal   5.7 - 6.4 High Risk   > 6.5 Diabetic    Hgb A1c results are standardized based on the (NGSP) National   Glycohemoglobin Standardization Program.    Hemoglobin A1C levels are related to mean serum/plasma glucose   during the preceding 2-3 months.

## 2023-04-21 ENCOUNTER — PATIENT MESSAGE (OUTPATIENT)
Dept: ADMINISTRATIVE | Facility: HOSPITAL | Age: 63
End: 2023-04-21
Payer: MEDICARE

## 2023-04-21 ENCOUNTER — PATIENT OUTREACH (OUTPATIENT)
Dept: ADMINISTRATIVE | Facility: HOSPITAL | Age: 63
End: 2023-04-21
Payer: MEDICARE

## 2023-04-21 NOTE — PROGRESS NOTES
2023 Care Everywhere updates requested and reviewed.  Immunizations reconciled. Media reports reviewed.  Duplicate HM overrides and  orders removed.  Overdue HM topic chart audit and/or requested.  Overdue lab testing linked to upcoming lab appointments if applies.  Lab [employer], and [employer] reviewed    Lab testing     DIS reviewed      Mammogram     Health Maintenance Due   Topic Date Due    Hepatitis C Screening  Never done    Pneumococcal Vaccines (Age 0-64) (1 - PCV) Never done    Eye Exam  Never done    HIV Screening  Never done    TETANUS VACCINE  Never done    Mammogram  Never done    Shingles Vaccine (1 of 2) Never done    Colorectal Cancer Screening  2018    COVID-19 Vaccine (4 - Booster for Moderna series) 2022    Influenza Vaccine (1) Never done

## 2023-04-21 NOTE — LETTER
May 1, 2023    Amanda Barton  [address] [facility] [place] [address]             [facility]  [address]  [facility] [address]  Phone: [phone] Dear Vick[facility] is committed to your overall health.  To help you get the most out of each of your visits, we will review your information to make sure you are up to date on all of your recommended tests and/or procedures.       Fer Herring III, PA-C  has found that your chart shows you may be due for :         Health Maintenance Due   Topic    Hepatitis C Screening     Pneumococcal Vaccines (Age 0-64) (1 - PCV)    Eye Exam     HIV Screening     TETANUS VACCINE     Mammogram     Shingles Vaccine (1 of 2)    Colorectal Cancer Screening     COVID-19 Vaccine (4 - Booster for Moderna series)    Influenza Vaccine (1)         If you have had any of the above done at another facility, please bring the records or information with you so that your record at [facility] will be complete.  If you would like to schedule any of these test, please call [phone] or send a message via [email or web address].        If you are currently taking medication, please bring it with you to your appointment for review.           Thank you for letting us care for you,        Fer Herring III, PA-C and your [facility] Team

## 2023-05-01 ENCOUNTER — TELEPHONE (OUTPATIENT)
Dept: HEMATOLOGY/ONCOLOGY | Facility: CLINIC | Age: 63
End: 2023-05-01
Payer: MEDICARE

## 2023-05-01 ENCOUNTER — NUTRITION (OUTPATIENT)
Dept: DIABETES | Facility: CLINIC | Age: 63
End: 2023-05-01
Payer: MEDICARE

## 2023-05-01 ENCOUNTER — TELEPHONE (OUTPATIENT)
Dept: PRIMARY CARE CLINIC | Facility: CLINIC | Age: 63
End: 2023-05-01

## 2023-05-01 ENCOUNTER — LAB VISIT (OUTPATIENT)
Dept: LAB | Facility: HOSPITAL | Age: 63
End: 2023-05-01
Attending: PHYSICIAN ASSISTANT
Payer: MEDICARE

## 2023-05-01 VITALS — BODY MASS INDEX: 41.04 KG/M2 | HEIGHT: 61 IN | WEIGHT: 217.38 LBS

## 2023-05-01 DIAGNOSIS — R41.0 CONFUSION: Primary | ICD-10-CM

## 2023-05-01 DIAGNOSIS — E11.65 TYPE 2 DIABETES MELLITUS WITH HYPERGLYCEMIA, WITH LONG-TERM CURRENT USE OF INSULIN: ICD-10-CM

## 2023-05-01 DIAGNOSIS — Z79.4 TYPE 2 DIABETES MELLITUS WITH HYPERGLYCEMIA, WITH LONG-TERM CURRENT USE OF INSULIN: ICD-10-CM

## 2023-05-01 DIAGNOSIS — R30.0 DYSURIA: ICD-10-CM

## 2023-05-01 DIAGNOSIS — D50.0 IRON DEFICIENCY ANEMIA DUE TO CHRONIC BLOOD LOSS: ICD-10-CM

## 2023-05-01 LAB
ALBUMIN SERPL BCP-MCNC: 3.1 G/DL (ref 3.5–5.2)
ALP SERPL-CCNC: 96 U/L (ref 55–135)
ALT SERPL W/O P-5'-P-CCNC: 13 U/L (ref 10–44)
ANION GAP SERPL CALC-SCNC: 13 MMOL/L (ref 8–16)
AST SERPL-CCNC: 11 U/L (ref 10–40)
BASOPHILS # BLD AUTO: 0.03 K/UL (ref 0–0.2)
BASOPHILS NFR BLD: 0.3 % (ref 0–1.9)
BILIRUB SERPL-MCNC: 0.4 MG/DL (ref 0.1–1)
BUN SERPL-MCNC: 11 MG/DL (ref 8–23)
CALCIUM SERPL-MCNC: 8.4 MG/DL (ref 8.7–10.5)
CHLORIDE SERPL-SCNC: 101 MMOL/L (ref 95–110)
CO2 SERPL-SCNC: 29 MMOL/L (ref 23–29)
CREAT SERPL-MCNC: 0.8 MG/DL (ref 0.5–1.4)
DIFFERENTIAL METHOD: ABNORMAL
EOSINOPHIL # BLD AUTO: 0.2 K/UL (ref 0–0.5)
EOSINOPHIL NFR BLD: 1.8 % (ref 0–8)
ERYTHROCYTE [DISTWIDTH] IN BLOOD BY AUTOMATED COUNT: 17.9 % (ref 11.5–14.5)
EST. GFR  (NO RACE VARIABLE): >60 ML/MIN/1.73 M^2
FERRITIN SERPL-MCNC: 80 NG/ML (ref 20–300)
GLUCOSE SERPL-MCNC: 152 MG/DL (ref 70–110)
HCT VFR BLD AUTO: 35.6 % (ref 37–48.5)
HGB BLD-MCNC: 11.2 G/DL (ref 12–16)
IMM GRANULOCYTES # BLD AUTO: 0.04 K/UL (ref 0–0.04)
IMM GRANULOCYTES NFR BLD AUTO: 0.4 % (ref 0–0.5)
IRON SERPL-MCNC: 59 UG/DL (ref 30–160)
LYMPHOCYTES # BLD AUTO: 1.7 K/UL (ref 1–4.8)
LYMPHOCYTES NFR BLD: 18.8 % (ref 18–48)
MCH RBC QN AUTO: 25.9 PG (ref 27–31)
MCHC RBC AUTO-ENTMCNC: 31.5 G/DL (ref 32–36)
MCV RBC AUTO: 82 FL (ref 82–98)
MONOCYTES # BLD AUTO: 0.5 K/UL (ref 0.3–1)
MONOCYTES NFR BLD: 5.7 % (ref 4–15)
NEUTROPHILS # BLD AUTO: 6.5 K/UL (ref 1.8–7.7)
NEUTROPHILS NFR BLD: 73 % (ref 38–73)
NRBC BLD-RTO: 0 /100 WBC
PLATELET # BLD AUTO: 197 K/UL (ref 150–450)
PMV BLD AUTO: 10.4 FL (ref 9.2–12.9)
POTASSIUM SERPL-SCNC: 3.2 MMOL/L (ref 3.5–5.1)
PROT SERPL-MCNC: 6.7 G/DL (ref 6–8.4)
RBC # BLD AUTO: 4.32 M/UL (ref 4–5.4)
SATURATED IRON: 16 % (ref 20–50)
SODIUM SERPL-SCNC: 143 MMOL/L (ref 136–145)
TOTAL IRON BINDING CAPACITY: 380 UG/DL (ref 250–450)
TRANSFERRIN SERPL-MCNC: 257 MG/DL (ref 200–375)
WBC # BLD AUTO: 8.96 K/UL (ref 3.9–12.7)

## 2023-05-01 PROCEDURE — 80053 COMPREHEN METABOLIC PANEL: CPT | Mod: PN | Performed by: INTERNAL MEDICINE

## 2023-05-01 PROCEDURE — G0108 DIAB MANAGE TRN  PER INDIV: HCPCS | Mod: S$GLB,,, | Performed by: DIETITIAN, REGISTERED

## 2023-05-01 PROCEDURE — 85025 COMPLETE CBC W/AUTO DIFF WBC: CPT | Mod: PN | Performed by: INTERNAL MEDICINE

## 2023-05-01 PROCEDURE — 84238 ASSAY NONENDOCRINE RECEPTOR: CPT | Performed by: INTERNAL MEDICINE

## 2023-05-01 PROCEDURE — 99999 PR PBB SHADOW E&M-EST. PATIENT-LVL II: CPT | Mod: PBBFAC,,, | Performed by: DIETITIAN, REGISTERED

## 2023-05-01 PROCEDURE — 84466 ASSAY OF TRANSFERRIN: CPT | Performed by: INTERNAL MEDICINE

## 2023-05-01 PROCEDURE — 82728 ASSAY OF FERRITIN: CPT | Performed by: INTERNAL MEDICINE

## 2023-05-01 PROCEDURE — 99999 PR PBB SHADOW E&M-EST. PATIENT-LVL II: ICD-10-PCS | Mod: PBBFAC,,, | Performed by: DIETITIAN, REGISTERED

## 2023-05-01 PROCEDURE — G0108 PR DIAB MANAGE TRN  PER INDIV: ICD-10-PCS | Mod: S$GLB,,, | Performed by: DIETITIAN, REGISTERED

## 2023-05-01 NOTE — PROGRESS NOTES
Diabetes Care Specialist Progress Note  Author: Melissa Maciel RD, CDE  Date: 5/1/2023    Program Intake  Reason for Diabetes Program Visit:: Initial Diabetes Assessment  Current diabetes risk level:: high  Permission to speak with others about care:: yes (Daughter Lisa is here with patient today for visit--she assists with care)    Lab Results   Component Value Date    HGBA1C 10.3 (H) 02/16/2023     Clinical    Patient Health Rating  Compared to other people your age, how would you rate your health?: Fair    Problem Review  Reviewed Problem List with Patient: yes  Active comorbidities affecting diabetes self-care.: yes  Comorbidities: Cardiovascular Disease, Other (comment) (hyperlipidemia, iron deficiency anemia)  Reviewed health maintenance: yes    Clinical Assessment  Current Diabetes Treatment: Oral Medication, Injectable, Insulin (metformin 1000 mg BID, Ozempic 0.25 mg weekly (Sundays--has 2 doses remaining and will increase to 0.5 mg dose), Lantus 45 units QHS, Novolog 8 units AC + correction scale (150/25))  Have you ever experienced hypoglycemia (low blood sugar)?: yes  In the last month, how often have you experienced low blood sugar?: once a week (only recently now that patient using Dexcom G7--tends to be stacking Novolog after meals when glucose elevated)  Are you able to tell when your blood sugar is low?: Yes  What symptoms do you experience?: Tiredness, Shaky, Dizzy/Light-headed (reports symptomatic for hypoglycemia when glucose < 100 mg/dL)  Have you ever been hospitalized because your blood sugar was too low?: no  How do you treat hypoglycemia (low blood sugar)?: other ([facility]--educated patient and daughter on proper treatment for hypoglycemia)  Have you ever experienced hyperglycemia (high blood sugar)?: yes  In the last month, how often have you experienced high blood sugar?: other (see comments) (not recently since establishing with endocrine 1 month ago)  Are you able  Per verbal order from Dr BRUNILDA Lindsey   Writer is to find out what is covered by pt insurance.    to tell when your blood sugar is high?: Yes  What are your symptoms?: fatigue, thirst (has high glucose alert set at 250 mg/dL)  Have you ever been hospitalized because your blood sugar was high?: yes (see comments) (once when glucose was > 500 mg/dL)    Medication Information  How do you obtain your medications?: Family picks up  How many days a week do you miss your medications?: Never  Do you sometimes have difficulty refilling your medications?: No  Medication adherence impacting ability to self-manage diabetes?: No    Labs  Do you have regular lab work to monitor your medications?: Yes  Type of Regular Lab Work: A1c, CBC  Where do you get your labs drawn?: [facility]  Lab Compliance Barriers: No    Nutritional Status  Diet: Regular (Frequently dining out due to increased medical appointments.  Both patient and daughter prepare meals at home.  Occasional intake of regular soda when diet soda not an option.  Does not like water.)  Meal Plan 24 Hour Recall: Lunch, Snack (Usually skips breakfast--eats 2 meals daily on average.  May snack in evening.)  Meal Plan 24 Hour Recall - Lunch: [employer] --2 chicken fingers, fries, [place] toast, kody slaw, diet soda OR [employer] nuggets + waffle fries, diet soda  Meal Plan 24 Hour Recall - Snack: has made an effort recently to decrease snacking and now keeping less snacks in home  Change in appetite?: Yes (does not have a large appetite)  Dentation:: Intact  Recent Changes in Weight: No Recent Weight Change (would like to lose weight)  Current nutritional status an area of need that is impacting patient's ability to self-manage diabetes?: No    Additional Social History    Support  Does anyone support you with your diabetes care?: yes  Who supports you?: son/daughter, self  Who takes you to your medical appointments?: son/daughter  Does the current support meet the patient's needs?: Yes  Is Support an area impacting ability to self-manage diabetes?: No    Access to [employer]  Technology  Does the patient have access to any of the following devices or technologies?: Smart phone  Media or technology needs impacting ability to self-manage diabetes?: No    Cognitive/Behavioral Health  Alert and Oriented: Yes  Difficulty Thinking: No  Requires Prompting: No  Requires assistance for routine expression?: No  Cognitive or behavioral barriers impacting ability to self-manage diabetes?: No    Culture/Zoroastrianism  Culture or Lutheran beliefs that may impact ability to access healthcare: No    Communication  Language preference: English  Hearing Problems: No  Vision Problems: No  Communication needs impacting ability to self-manage diabetes?: No    Health Literacy  Preferred Learning Method: Face to Face, Reading Materials  How often do you need to have someone help you read instructions, pamphlets, or written material from your doctor or pharmacy?: Never  Health literacy needs impacting ability to self-manage diabetes?: No    Diabetes Self-Management Skills Assessment    Diabetes Disease Process/Treatment Options  Patient/caregiver able to state what happens when someone has diabetes.: yes  Patient/caregiver knows what type of diabetes they have.: yes  Diabetes Type : Type II (diagnosed in 1990s)  Patient/caregiver able to identify at least three signs and symptoms of diabetes.: yes  Identified signs and symptoms:: fatigue, increased thirst  Patient able to identify at least three risk factors for diabetes.: yes  Identified risk factors:: age over 40, being overweight, reduced activity  Diabetes Disease Process/Treatment Options: Skills Assessment Completed: Yes  Assessment indicates:: Adequate understanding  Area of need?: No    Nutrition/Healthy Eating  Challenges to healthy eating:: eating out, going to parties  Method of carbohydrate measurement:: no method  Patient can identify foods that impact blood sugar.: yes  Patient-identified foods:: starches (bread, pasta, rice, cereal), sweets,  soda  Nutrition/Healthy Eating Skills Assessment Completed:: Yes  Assessment indicates:: Instruction Needed  Area of need?: Yes    Physical Activity/Exercise  Patient's daily activity level:: sedentary  Patient formally exercises outside of work.: no  Reasons for not exercising:: physically unable to exercise currently (2 recent back surgeries--limited as patient unable to ambulate safely, uses wheelchair)  Patient can identify forms of physical activity.: yes  Stated forms of physical activity:: moving to burn calories  Patient can identify reasons why exercise/physical activity is important in diabetes management.: yes  Identified reasons:: lowers blood glucose, blood pressure, and cholesterol  Physical Activity/Exercise Skills Assessment Completed: : Yes  Assessment indicates:: Adequate understanding  Area of need?: No    Medications  Patient is able to describe current diabetes management routine.: yes  Diabetes management routine:: injectable medications, insulin, oral medications  Patient is able to identify current diabetes medications, dosages, and appropriate timing of medications.: no (Daughter found out today that patient has been dosing additional Novolog (stacking after meals) for past week since initiating Dexcom G7 due to increased glucose data now available)  Patient understands the purpose of the medications taken for diabetes.: yes (re-educated on how Lantus and Novolog work to decrease glucose levels)  Patient reports problems or concerns with current medication regimen.: yes  Medication regimen problems/concerns:: unsure of doses  Medication Skills Assessment Completed:: Yes  Assessment indicates:: Instruction Needed  Area of need?: Yes    Home Blood Glucose Monitoring  Patient states that blood sugar is checked at home daily.: yes  Monitoring Method:: personal continuous glucose monitor  Personal CGM type:: Dexcom G7--self started device last week (4/24/23)  Patient is able to use personal CGM  appropriately.: yes (Did educate patient and daughter how to enter insulin doses into Dexcom G7 yasmin to better determine how much and when insulin being delivered)  CGM Report reviewed?: yes    Dexcom G7 download (4/25/23 to 5/1/23): See media file for details--patient initiated Dexcom G7 last week so only 1 week CGMS data available for review. Greater than 80% of glucose values are in target range. Dignificant improvement in glucose over past month since establishing with Endocrine for DM medication management.  Occasional episodes of hypoglycemia noted in the download--likely from stacking Novolog as patient now has more access to glucose readings and dosing Novolog after meals if glucose levels elevated. Re-educated on how to properly dose Novolog.    Sensor usage: 86%  Average glucose: 139 mg/dL    3% CGM readings greater than 250 mg/dL  13% CGM readings between 181-250 mg/dL  81% CGM readings in target glucose range of  mg/dL   2% CGM readings between 54-79%  <1% CGM readings less than 54 mg/dL     Home Blood Glucose Monitoring Skills Assessment Completed: : Yes  Assessment indicates:: Adequate understanding  Area of need?: No    Acute Complications  Patient is able to identify types of acute complications: Yes  Patient Identified:: Hypoglycemia, Hyperglycemia  Patient is able to state the basic meaning of hypoglycemia?: No  Able to state the blood sugar range for hypoglycemia?: no (see comments) (Patient symptomatic for hypoglycemia when glucose < 100 mg/dL but did discuss meaning of hypoglycemia to mean glucose < 70 mg/dL)  Patient can identify general symptoms of hypoglycemia: yes  Patient identified:: dizziness, fatigue, shakiness  Able to state proper treatment of hypoglycemia?: no (see comments) (Patient treated with [employer] snack cake--written handout and education provided today on 15-15 rule for treating hypoglycemia)  Patient is able to state the basic meaning of hyperglycemia?:  Yes  Patient able to state proper treatment of hyperglycemia?: yes  Patient identified:: monitor blood sugar, take medication as recommended, contact provider  Acute Complications Skills Assessment Completed: : Yes  Assessment indicates:: Instruction Needed  Area of need?: Yes    Chronic Complications  Patient can identify major chronic complications of diabetes.: yes  Stated chronic complications:: heart disease/heart attack  Patient can identify ways to prevent or delay diabetes complications.: yes  Stated ways to prevent complications:: controlling blood sugar  Patient is aware that having diabetes increases risk of heart disease?: Yes  Patient is aware that heart disease is the leading cause of death and disability in people with diabetes?: Yes  Patient able to state risk factors for heart disease?: Yes  Patient stated risk factors for heart disease:: Having diabetes, High cholesterol, Limited activity  Patient is taking statin?: Yes  Chronic Complications Skills Assessment Completed: : Yes  Assessment indicates:: Adequate understanding  Area of need?: No    Psychosocial/Coping  Patient can identify ways of coping with chronic disease.: yes  Patient-stated ways of coping with chronic disease:: support from loved ones  Psychosocial/Coping Skills Assessment Completed: : Yes  Assessment indicates:: Adequate understanding  Area of need?: No    Assessment Summary and Plan    Based on today's diabetes care assessment, the following areas of need were identified:      Social 5/1/2023   Support No   Access to Mass Media/Tech No   Cognitive/Behavioral Health No   Culture/Voodoo No   Communication No   Health Literacy No      Clinical 5/1/2023   Medication Adherence No   Lab Compliance No   Nutritional Status No      Diabetes Self-Management Skills 5/1/2023   Diabetes Disease Process/Treatment Options No   Nutrition/Healthy Eating Yes--see care plan   Physical Activity/Exercise No   Medication Yes--see care plan   Home  Blood Glucose Monitoring No--Dexcom G7 initiated last week   Acute Complications Yes--see care plan   Chronic Complications No   Psychosocial/Coping No      Today's interventions were provided through individual discussion, instruction, and written materials were provided.      Patient verbalized understanding of instruction and written materials.  Pt was able to return back demonstration of instructions today. Patient understood key points, needs reinforcement and further instruction.     Diabetes Self-Management Care Plan:    Today's Diabetes Self-Management Care Plan was developed with Amanda's input. Amanda has agreed to work toward the following goal(s) to improve his/her overall diabetes control.      Care Plan: Diabetes Management   Updates made since 4/1/2023 12:00 AM     Problem: Healthy Eating         Goal: Continue to avoid sweetened beverages--would benefit from increasing water intake (may add no sugar flavoring).  Limit carbs at meals to a fistful and include lean protein source at all meals/snacks.  Incorporate more non-starchy vegetables.    Start Date: 5/1/2023   Expected End Date: 11/1/2023   Priority: High   Barriers: No Barriers Identified        Task: Reviewed the sources and role of Carbohydrate, Protein, and Fat and how each nutrient impacts blood sugar. Completed 5/1/2023        Task: Provided visual examples using dry measuring cups, food models, and other familiar objects such as computer mouse, deck or cards, tennis ball etc. to help with visualization of portions. Completed 5/1/2023        Task: Discussed strategies for choosing healthier menu options when dining out. Completed 5/1/2023        Task: Recommended replacing beverages containing high sugar content with noncaloric/sugar free options and/or water. Completed 5/1/2023        Task: Provided Sample plate method and reviewed the use of the plate to estimate amounts of carbohydrate per meal. Completed 5/1/2023        Problem: Acute  Complications         Goal: Patient agrees to identify and manage signs and symptoms of high/low blood sugar (hyper/hypoglycemia) by keeping a log of events and using proper treatment.    Start Date: 5/1/2023   Expected End Date: 11/1/2023   Priority: Medium   Barriers: No Barriers Identified        Task: Reviewed proper treatment of hypoglycemia with the rule of 15--patient to eat 15g simple carbohydrate (4 glucose tablets, 1 glucose gel, 5 pieces hard candy, ½ cup fruit juice, ½ can regular soda, etc) and wait 15 minutes and recheck home glucose. Completed 5/1/2023        Task: Reviewed common causes and precautions to help prevent hyper/hypoglycemic events. Completed 5/1/2023        Task: Reviewed signs and symptoms of hyper/hypoglycemia, what range is considered to be hyper/hypoglycemia, and when to seek further medical attention. Completed 5/1/2023        Problem: Medications         Goal: Patient Agrees to take Diabetes Medication(s) as prescribed: Decrease Lantus from 45 units QHS to 40 units QHS per Endocrine provider.  Continue Novolog 8 units AC + correction (150/25), Ozempic 0.25 mg weekly (will increase to 0.5 mg), and metformin 1000 mg BID.    Start Date: 5/1/2023   Expected End Date: 11/1/2023   Priority: Low   Barriers: No Barriers Identified        Task: Reviewed with patient all current diabetes medications and provided basic review of the purpose, dosage, frequency, side effects, and storage of both oral and injectable diabetes medications. Completed 5/1/2023        Task: Instructed patient on how to enter Lantus and Novolog doses into Dexcom G7 mobile yasmin to better determine insulin usage. Completed 5/1/2023        Task: Discussed guidelines for preventing, detecting and treating hypoglycemia and hyperglycemia and reviewed the importance of meal and medication timing with diabetes mediations for prevention of hypoglycemia and maximum drug benefit. Completed 5/1/2023        Follow Up Plan      Follow up in about 6 months (around 11/1/2023) for continued DSMES. Patient/daughter self-started Dexcom G7 last week--patient found to be stacking Novolog when glucose elevated post prandial now that she has more frequent glucose readings. 1-2 episodes of hypoglycemia noted in Dexcom download likely from stacking Novolog. Educated on proper treatment of hypoglycemia (15-15 rule) and written education provided for home use. Long discussion on importance of limiting Novolog to before meals only.  Patient has discontinued most regular sodas--will drink on occasion when diet soda not available. Does not care for water--will consider trying no-sugar flavorings to water to increase intake.  Patient reporting she is feeling poorly--gets symptomatic for hypoglycemia when glucose < 100 mg/dL--may be due to having uncontrolled diabetes for some time and now glucose control nearing target.  Endocrine provider decreased Lantus from 45 to 40 units QHS.  Continue Novolog 8 units AC + correction (150/25), Ozempic (0.25 mg weekly and will be increasing to 0.5 mg in 2 weeks), and metformin 1000 mg BID.      Discussed more balanced approach to meal planning--discussed reducing portions of carbs when dining out.  Would benefit from increasing intake of non-starchy vegetables at meals.  Practiced meal planning with foods typically eaten--patient has been dining out more frequently due to increased medical appointments and being on the road for those more often.     Assisted patient with linking CGMS data to [facility] in Dexcom Clarity.  Also educated patient and daughter how to enter insulin doses into Dexcoom G7 mobile yasmin to better determine how frequently and how much insulin is being taken on a daily basis.      Today's care plan and follow up schedule was discussed with patient.  Amanda verbalized understanding of the care plan, goals, and agrees to follow up plan.        The patient was encouraged to communicate with  his/her health care provider/physician and care team regarding his/her condition(s) and treatment.  I provided the patient with my contact information today and encouraged to contact me via phone or [facility] Patient Portal as needed.     Length of Visit   Total Time: 60 Minutes

## 2023-05-01 NOTE — TELEPHONE ENCOUNTER
----- Message from Yamile Hernandez, Patient Care Assistant sent at 5/1/2023  8:37 AM CDT -----  Contact: Pt Mane Gonzalez  Type: Needs Medical Advice    Who Called: Pt Mane Gonzalez  Best Call Back Number: [phone]  Inquiry/Question: Lisa is calling to  see if the pt can have her labs scheduled for today and move appt to a later day this week. Please advise. Thank you~    Spoke with Pt mane/Lisa. Rescheduled labs for 5/1 @ 2:25 pm, appointment rescheduled 5/8 @ 2:40 pm. Will call pt back to confirm.

## 2023-05-01 NOTE — TELEPHONE ENCOUNTER
----- Message from Isauro Ngo sent at 5/1/2023  7:15 AM CDT -----  Contact: pt at [phone]  Type: Needs Medical Advice  Who Called:  pt  Best Call Back Number: [phone]  Additional Information: pt is calling the office requesting a call back from the nurse. Please call back and advise.

## 2023-05-01 NOTE — TELEPHONE ENCOUNTER
Pt requesting orders for Urine test to be done at [facility] also requesting a Neuro referral    Please advise

## 2023-05-01 NOTE — TELEPHONE ENCOUNTER
Pt daughter said the pt has been having some confusion and that they have a hx of CVA and she wanted her to get tested to be sure

## 2023-05-02 ENCOUNTER — PATIENT MESSAGE (OUTPATIENT)
Dept: PRIMARY CARE CLINIC | Facility: CLINIC | Age: 63
End: 2023-05-02
Payer: MEDICARE

## 2023-05-03 ENCOUNTER — PATIENT MESSAGE (OUTPATIENT)
Dept: PRIMARY CARE CLINIC | Facility: CLINIC | Age: 63
End: 2023-05-03
Payer: MEDICARE

## 2023-05-04 LAB — STFR SERPL-MCNC: 3.3 MG/L (ref 1.8–4.6)

## 2023-05-05 ENCOUNTER — TELEPHONE (OUTPATIENT)
Dept: PRIMARY CARE CLINIC | Facility: CLINIC | Age: 63
End: 2023-05-05
Payer: MEDICARE

## 2023-05-05 DIAGNOSIS — R30.0 DYSURIA: Primary | ICD-10-CM

## 2023-05-08 ENCOUNTER — TELEPHONE (OUTPATIENT)
Dept: HEMATOLOGY/ONCOLOGY | Facility: CLINIC | Age: 63
End: 2023-05-08
Payer: MEDICARE

## 2023-05-12 ENCOUNTER — TELEPHONE (OUTPATIENT)
Dept: HEMATOLOGY/ONCOLOGY | Facility: CLINIC | Age: 63
End: 2023-05-12
Payer: MEDICARE

## 2023-05-12 ENCOUNTER — TELEPHONE (OUTPATIENT)
Dept: ENDOCRINOLOGY | Facility: CLINIC | Age: 63
End: 2023-05-12
Payer: MEDICARE

## 2023-05-12 NOTE — TELEPHONE ENCOUNTER
Left message to call the office to schedule/reschedule appt. Recall number provided.  ----- Message from Ludwin Foster sent at 5/12/2023  9:05 AM CDT -----  Regarding: reschedule appts  Type:  Sooner Apoointment Request    Caller is requesting a sooner appointment.  Caller declined first available appointment listed below.  Caller will not accept being placed on the waitlist and is requesting a message be sent to doctor.  Name of Caller:Pt  When is the first available appointment?   Symptoms:reschedules several appts  Would the patient rather a call back or a response via [facility]? Call back  Best Call Back Number:[phone]  Additional Information: needs to reschedule Dr Diamond, infusion and labs.  Has to be Mondays or Thursdays.  Please advise -- Thank you

## 2023-05-12 NOTE — TELEPHONE ENCOUNTER
----- Message from Lucero Knight sent at 5/12/2023  3:38 PM CDT -----  Type: Needs Medical Advice  Who Called:  Patient    Best Call Back Number: [phone]    Additional Information: Pt states she would like to speak with office regarding getting appt audie thats on 5/18.Please call back

## 2023-05-12 NOTE — TELEPHONE ENCOUNTER
Spoke with daughter and got the pt iron scheduled and to see Bj on 05/29. Pt daughter verbalized and understanding.  ----- Message from Agustina Gonzalez sent at 5/12/2023  3:00 PM CDT -----  I spoke with patient. I have her jasson'd for her last iron infusion 5/25. She would like a call to [facility] her labs and MD appt.

## 2023-05-15 ENCOUNTER — OFFICE VISIT (OUTPATIENT)
Dept: PRIMARY CARE CLINIC | Facility: CLINIC | Age: 63
End: 2023-05-15
Payer: MEDICARE

## 2023-05-15 VITALS
BODY MASS INDEX: 42.26 KG/M2 | RESPIRATION RATE: 18 BRPM | WEIGHT: 223.69 LBS | TEMPERATURE: 98 F | HEART RATE: 92 BPM | OXYGEN SATURATION: 98 % | DIASTOLIC BLOOD PRESSURE: 72 MMHG | SYSTOLIC BLOOD PRESSURE: 122 MMHG

## 2023-05-15 DIAGNOSIS — Z12.31 ENCOUNTER FOR SCREENING MAMMOGRAM FOR MALIGNANT NEOPLASM OF BREAST: ICD-10-CM

## 2023-05-15 DIAGNOSIS — Z79.4 TYPE 2 DIABETES MELLITUS WITH MICROALBUMINURIA, WITH LONG-TERM CURRENT USE OF INSULIN: Primary | ICD-10-CM

## 2023-05-15 DIAGNOSIS — E11.29 TYPE 2 DIABETES MELLITUS WITH MICROALBUMINURIA, WITH LONG-TERM CURRENT USE OF INSULIN: Primary | ICD-10-CM

## 2023-05-15 DIAGNOSIS — R30.0 DYSURIA: ICD-10-CM

## 2023-05-15 DIAGNOSIS — I48.0 PAROXYSMAL ATRIAL FIBRILLATION: ICD-10-CM

## 2023-05-15 DIAGNOSIS — R80.9 TYPE 2 DIABETES MELLITUS WITH MICROALBUMINURIA, WITH LONG-TERM CURRENT USE OF INSULIN: Primary | ICD-10-CM

## 2023-05-15 DIAGNOSIS — Z12.39 ENCOUNTER FOR SCREENING FOR MALIGNANT NEOPLASM OF BREAST, UNSPECIFIED SCREENING MODALITY: ICD-10-CM

## 2023-05-15 DIAGNOSIS — J44.9 CHRONIC OBSTRUCTIVE PULMONARY DISEASE, UNSPECIFIED COPD TYPE: ICD-10-CM

## 2023-05-15 LAB
ALBUMIN SERPL BCP-MCNC: 3.3 G/DL (ref 3.5–5.2)
ALP SERPL-CCNC: 124 U/L (ref 55–135)
ALT SERPL W/O P-5'-P-CCNC: 15 U/L (ref 10–44)
ANION GAP SERPL CALC-SCNC: 10 MMOL/L (ref 8–16)
AST SERPL-CCNC: 16 U/L (ref 10–40)
BILIRUB SERPL-MCNC: 0.2 MG/DL (ref 0.1–1)
BILIRUBIN, UA POC OHS: NEGATIVE
BLOOD, UA POC OHS: NEGATIVE
BUN SERPL-MCNC: 13 MG/DL (ref 8–23)
CALCIUM SERPL-MCNC: 8.5 MG/DL (ref 8.7–10.5)
CHLORIDE SERPL-SCNC: 106 MMOL/L (ref 95–110)
CHOLEST SERPL-MCNC: 171 MG/DL (ref 120–199)
CHOLEST/HDLC SERPL: 5 {RATIO} (ref 2–5)
CLARITY, UA POC OHS: CLEAR
CO2 SERPL-SCNC: 28 MMOL/L (ref 23–29)
COLOR, UA POC OHS: YELLOW
CREAT SERPL-MCNC: 0.7 MG/DL (ref 0.5–1.4)
EST. GFR  (NO RACE VARIABLE): >60 ML/MIN/1.73 M^2
ESTIMATED AVG GLUCOSE: 177 MG/DL (ref 68–131)
GLUCOSE SERPL-MCNC: 146 MG/DL (ref 70–110)
GLUCOSE, UA POC OHS: NEGATIVE
HBA1C MFR BLD: 7.8 % (ref 4–5.6)
HDLC SERPL-MCNC: 34 MG/DL (ref 40–75)
HDLC SERPL: 19.9 % (ref 20–50)
KETONES, UA POC OHS: NEGATIVE
LDLC SERPL CALC-MCNC: 87 MG/DL (ref 63–159)
LEUKOCYTES, UA POC OHS: ABNORMAL
NITRITE, UA POC OHS: POSITIVE
NONHDLC SERPL-MCNC: 137 MG/DL
PH, UA POC OHS: 6
POTASSIUM SERPL-SCNC: 3.5 MMOL/L (ref 3.5–5.1)
PROT SERPL-MCNC: 6.5 G/DL (ref 6–8.4)
PROTEIN, UA POC OHS: NEGATIVE
SODIUM SERPL-SCNC: 144 MMOL/L (ref 136–145)
SPECIFIC GRAVITY, UA POC OHS: 1.02
TRIGL SERPL-MCNC: 250 MG/DL (ref 30–150)
UROBILINOGEN, UA POC OHS: 0.2

## 2023-05-15 PROCEDURE — 3046F HEMOGLOBIN A1C LEVEL >9.0%: CPT | Mod: CPTII,S$GLB,, | Performed by: PHYSICIAN ASSISTANT

## 2023-05-15 PROCEDURE — 99214 OFFICE O/P EST MOD 30 MIN: CPT | Mod: S$GLB,,, | Performed by: PHYSICIAN ASSISTANT

## 2023-05-15 PROCEDURE — 3060F PR POS MICROALBUMINURIA RESULT DOCUMENTED/REVIEW: ICD-10-PCS | Mod: CPTII,S$GLB,, | Performed by: PHYSICIAN ASSISTANT

## 2023-05-15 PROCEDURE — 4010F PR ACE/ARB THEARPY RXD/TAKEN: ICD-10-PCS | Mod: CPTII,S$GLB,, | Performed by: PHYSICIAN ASSISTANT

## 2023-05-15 PROCEDURE — 87088 URINE BACTERIA CULTURE: CPT | Performed by: PHYSICIAN ASSISTANT

## 2023-05-15 PROCEDURE — 81003 URINALYSIS AUTO W/O SCOPE: CPT | Mod: QW,S$GLB,, | Performed by: PHYSICIAN ASSISTANT

## 2023-05-15 PROCEDURE — 3008F BODY MASS INDEX DOCD: CPT | Mod: CPTII,S$GLB,, | Performed by: PHYSICIAN ASSISTANT

## 2023-05-15 PROCEDURE — 4010F ACE/ARB THERAPY RXD/TAKEN: CPT | Mod: CPTII,S$GLB,, | Performed by: PHYSICIAN ASSISTANT

## 2023-05-15 PROCEDURE — 3074F SYST BP LT 130 MM HG: CPT | Mod: CPTII,S$GLB,, | Performed by: PHYSICIAN ASSISTANT

## 2023-05-15 PROCEDURE — 87086 URINE CULTURE/COLONY COUNT: CPT | Performed by: PHYSICIAN ASSISTANT

## 2023-05-15 PROCEDURE — 80061 LIPID PANEL: CPT | Performed by: PHYSICIAN ASSISTANT

## 2023-05-15 PROCEDURE — 3078F PR MOST RECENT DIASTOLIC BLOOD PRESSURE < 80 MM HG: ICD-10-PCS | Mod: CPTII,S$GLB,, | Performed by: PHYSICIAN ASSISTANT

## 2023-05-15 PROCEDURE — 87077 CULTURE AEROBIC IDENTIFY: CPT | Performed by: PHYSICIAN ASSISTANT

## 2023-05-15 PROCEDURE — 3008F PR BODY MASS INDEX (BMI) DOCUMENTED: ICD-10-PCS | Mod: CPTII,S$GLB,, | Performed by: PHYSICIAN ASSISTANT

## 2023-05-15 PROCEDURE — 3066F PR DOCUMENTATION OF TREATMENT FOR NEPHROPATHY: ICD-10-PCS | Mod: CPTII,S$GLB,, | Performed by: PHYSICIAN ASSISTANT

## 2023-05-15 PROCEDURE — 36415 COLL VENOUS BLD VENIPUNCTURE: CPT | Mod: S$GLB,,, | Performed by: INTERNAL MEDICINE

## 2023-05-15 PROCEDURE — 3074F PR MOST RECENT SYSTOLIC BLOOD PRESSURE < 130 MM HG: ICD-10-PCS | Mod: CPTII,S$GLB,, | Performed by: PHYSICIAN ASSISTANT

## 2023-05-15 PROCEDURE — 80053 COMPREHEN METABOLIC PANEL: CPT | Performed by: PHYSICIAN ASSISTANT

## 2023-05-15 PROCEDURE — 3078F DIAST BP <80 MM HG: CPT | Mod: CPTII,S$GLB,, | Performed by: PHYSICIAN ASSISTANT

## 2023-05-15 PROCEDURE — 3066F NEPHROPATHY DOC TX: CPT | Mod: CPTII,S$GLB,, | Performed by: PHYSICIAN ASSISTANT

## 2023-05-15 PROCEDURE — 99214 PR OFFICE/OUTPT VISIT, EST, LEVL IV, 30-39 MIN: ICD-10-PCS | Mod: S$GLB,,, | Performed by: PHYSICIAN ASSISTANT

## 2023-05-15 PROCEDURE — 81003 POCT URINALYSIS(INSTRUMENT): ICD-10-PCS | Mod: QW,S$GLB,, | Performed by: PHYSICIAN ASSISTANT

## 2023-05-15 PROCEDURE — 3046F PR MOST RECENT HEMOGLOBIN A1C LEVEL > 9.0%: ICD-10-PCS | Mod: CPTII,S$GLB,, | Performed by: PHYSICIAN ASSISTANT

## 2023-05-15 PROCEDURE — 36415 PR COLLECTION VENOUS BLOOD,VENIPUNCTURE: ICD-10-PCS | Mod: S$GLB,,, | Performed by: INTERNAL MEDICINE

## 2023-05-15 PROCEDURE — 87186 SC STD MICRODIL/AGAR DIL: CPT | Performed by: PHYSICIAN ASSISTANT

## 2023-05-15 PROCEDURE — 83036 HEMOGLOBIN GLYCOSYLATED A1C: CPT | Performed by: PHYSICIAN ASSISTANT

## 2023-05-15 PROCEDURE — 3060F POS MICROALBUMINURIA REV: CPT | Mod: CPTII,S$GLB,, | Performed by: PHYSICIAN ASSISTANT

## 2023-05-15 RX ORDER — QUETIAPINE FUMARATE 50 MG/1
50 TABLET, FILM COATED ORAL NIGHTLY
Qty: 30 TABLET | Refills: 1 | Status: SHIPPED | OUTPATIENT
Start: 2023-05-15 | End: 2023-06-13

## 2023-05-15 NOTE — PROGRESS NOTES
Subjective     Patient ID: Amanda Barton is a 63 y.o. female.    Chief Complaint: Follow-up    Patient is a 62 yo female here for a follow up.     Patient Active Problem List:     History of non-ST elevation myocardial infarction (NSTEMI)     BJ (obstructive sleep apnea)     Bacteremia due to methicillin resistant Staphylococcus aureus     Chronic diastolic heart failure     Diastolic dysfunction     Paroxysmal atrial fibrillation     Pericarditis     Cough     Sepsis     Febrile illness     Iron deficiency anemia     Troponin level elevated     Obesity     Chronic anticoagulation     Symptomatic anemia     Constipation     GI bleed     Type 2 diabetes mellitus with hyperglycemia, with long-term current use of insulin     Type 2 diabetes mellitus with diabetic polyneuropathy, with long-term current use of insulin     Type 2 diabetes mellitus with microalbuminuria, with long-term current use of insulin     Chronic obstructive pulmonary disease, unspecified COPD type     Request something to help her sleep.     Past Medical History:  12/02/2017: Acute bacterial pericarditis  No date: Anemia  11/20/2017: Bacteremia due to methicillin resistant Staphylococcus   aureus  No date: Diabetes mellitus  No date: Encounter for blood transfusion  No date: GERD (gastroesophageal reflux disease)  No date: Heart murmur  No date: History of pneumonia  No date: History of UTI  No date: Hypertension  No date: Migraine headache  No date: Sleep apnea      Comment:  no machine yet  11/19/2017: Type 2 diabetes mellitus without complication, with long-  term current use of insulin      Review of Systems   Constitutional: Negative.    HENT: Negative.     Eyes: Negative.    Respiratory:  Negative for chest tightness, shortness of breath and wheezing.    Cardiovascular:  Negative for chest pain, palpitations and leg swelling.   Gastrointestinal:  Negative for abdominal pain, blood in stool and nausea.   Endocrine: Negative.     Genitourinary: Negative.    Musculoskeletal: Negative.    Integumentary:  Negative.   Neurological:  Negative for dizziness, seizures and weakness.        Objective     Physical Exam  Vitals reviewed.   Constitutional:       General: She is not in acute distress.     Appearance: Normal appearance. She is not ill-appearing, toxic-appearing or diaphoretic.   Cardiovascular:      Rate and Rhythm: Normal rate and regular rhythm.      Pulses: Normal pulses.      Heart sounds: Normal heart sounds. No murmur heard.    No friction rub. No gallop.   Pulmonary:      Effort: Pulmonary effort is normal. No respiratory distress.      Breath sounds: Normal breath sounds. No stridor. No wheezing, rhonchi or rales.   Chest:      Chest wall: No tenderness.   Neurological:      Mental Status: She is alert.          Assessment and Plan     Problem List Items Addressed This Visit       Type 2 diabetes mellitus with microalbuminuria, with long-term current use of insulin - Primary    Relevant Orders    Hemoglobin A1C    Comprehensive Metabolic Panel    Lipid Panel    Paroxysmal atrial fibrillation    Chronic obstructive pulmonary disease, unspecified COPD type     Other Visit Diagnoses       Dysuria        Relevant Orders    POCT Urinalysis(Instrument)    Urine culture    Encounter for screening for malignant neoplasm of breast, unspecified screening modality        Relevant Orders    Mammo Digital Screening Bilat w/ Vin    Encounter for screening mammogram for malignant neoplasm of breast        Relevant Orders    Mammo Digital Screening Bilat w/ Vin            Type 2 diabetes mellitus with microalbuminuria, with long-term current use of insulin  -     Hemoglobin A1C; Future; Expected date: 05/15/2023  -     Comprehensive Metabolic Panel; Future; Expected date: 05/15/2023  -     Lipid Panel; Future; Expected date: 05/15/2023    Dysuria  -     POCT Urinalysis(Instrument)  -     Urine culture    Encounter for screening for malignant  neoplasm of breast, unspecified screening modality  -     Mammo Digital Screening Bilat w/ Vin; Future; Expected date: 05/15/2023    Encounter for screening mammogram for malignant neoplasm of breast  -     Mammo Digital Screening Bilat w/ Vin; Future; Expected date: 05/15/2023    Chronic obstructive pulmonary disease, unspecified COPD type  The current medical regimen is effective;  continue present plan and medications.  Followed by pulmonary  Paroxysmal atrial fibrillation  Stable and controlled. Continue current treatment plan as previously prescribed with your PCP.    Other orders  -     QUEtiapine (SEROQUEL) 50 MG tablet; Take 1 tablet (50 mg total) by mouth every evening.  Dispense: 30 tablet; Refill: 1         I spent 30 minutes on this encounter, time includes face-to-face, chart review, documentation, test review and orders.

## 2023-05-16 ENCOUNTER — PATIENT MESSAGE (OUTPATIENT)
Dept: PRIMARY CARE CLINIC | Facility: CLINIC | Age: 63
End: 2023-05-16
Payer: MEDICARE

## 2023-05-17 LAB — BACTERIA UR CULT: ABNORMAL

## 2023-05-18 ENCOUNTER — TELEPHONE (OUTPATIENT)
Dept: FAMILY MEDICINE | Facility: CLINIC | Age: 63
End: 2023-05-18
Payer: MEDICARE

## 2023-05-18 RX ORDER — AMOXICILLIN AND CLAVULANATE POTASSIUM 500; 125 MG/1; MG/1
1 TABLET, FILM COATED ORAL 2 TIMES DAILY
Qty: 14 TABLET | Refills: 0 | Status: SHIPPED | OUTPATIENT
Start: 2023-05-18 | End: 2023-05-25

## 2023-05-18 NOTE — TELEPHONE ENCOUNTER
----- Message from Aidee Rowley sent at 5/18/2023 10:13 AM CDT -----  Contact: pt [phone]  Type:  Test Results    Who Called:  Pt   Name of Test (Lab/Mammo/Etc): Labs  Date of Test:  05/15  Ordering Provider:  Nevaeh   Where the test was performed:  In office   Best Call Back Number:  [phone]    Additional Information:  Pls call back and advise

## 2023-05-18 NOTE — TELEPHONE ENCOUNTER
----- Message from Fer Herring III, PA-C sent at 5/18/2023  6:25 AM CDT -----  Amanda Barton    The urine culture shows an infection. I sent antibiotics to your pharmacy.   Continue with antibiotics and new medicines until gone. May take tylenol PRN fever. Increase fluids and rest. Call the clinic if not better in 3 to 5 days. Suggest togo to the Emergency Room if symptoms get much worse. Otherwise follow up with your PCP as scheduled.

## 2023-05-19 LAB — BCS RECOMMENDATION EXT: NORMAL

## 2023-05-19 RX ORDER — ALPRAZOLAM 1 MG/1
1 TABLET ORAL 2 TIMES DAILY
Qty: 60 TABLET | Refills: 2 | Status: SHIPPED | OUTPATIENT
Start: 2023-05-19 | End: 2023-07-14

## 2023-05-22 RX ORDER — NITROFURANTOIN 25; 75 MG/1; MG/1
100 CAPSULE ORAL 2 TIMES DAILY
Qty: 14 CAPSULE | Refills: 0 | Status: SHIPPED | OUTPATIENT
Start: 2023-05-22 | End: 2023-05-29

## 2023-05-23 ENCOUNTER — TELEPHONE (OUTPATIENT)
Dept: PRIMARY CARE CLINIC | Facility: CLINIC | Age: 63
End: 2023-05-23
Payer: MEDICARE

## 2023-05-23 DIAGNOSIS — R92.8 ABNORMAL MAMMOGRAM OF BOTH BREASTS: Primary | ICD-10-CM

## 2023-05-23 NOTE — TELEPHONE ENCOUNTER
Pt aware of mammo results, no other concerns were voiced    Please place orders for diagnostic and US    Please advise

## 2023-05-25 ENCOUNTER — INFUSION (OUTPATIENT)
Dept: INFUSION THERAPY | Facility: HOSPITAL | Age: 63
End: 2023-05-25
Attending: ORTHOPAEDIC SURGERY
Payer: MEDICARE

## 2023-05-25 VITALS
HEART RATE: 78 BPM | BODY MASS INDEX: 41.94 KG/M2 | TEMPERATURE: 98 F | DIASTOLIC BLOOD PRESSURE: 60 MMHG | SYSTOLIC BLOOD PRESSURE: 119 MMHG | OXYGEN SATURATION: 95 % | HEIGHT: 61 IN | RESPIRATION RATE: 18 BRPM | WEIGHT: 222.13 LBS

## 2023-05-25 DIAGNOSIS — D50.0 IRON DEFICIENCY ANEMIA DUE TO CHRONIC BLOOD LOSS: ICD-10-CM

## 2023-05-25 DIAGNOSIS — D64.9 SYMPTOMATIC ANEMIA: Primary | ICD-10-CM

## 2023-05-25 PROCEDURE — 63600175 PHARM REV CODE 636 W HCPCS: Mod: PN | Performed by: INTERNAL MEDICINE

## 2023-05-25 PROCEDURE — 96374 THER/PROPH/DIAG INJ IV PUSH: CPT | Mod: PN

## 2023-05-25 PROCEDURE — 25000003 PHARM REV CODE 250: Mod: PN | Performed by: INTERNAL MEDICINE

## 2023-05-25 RX ORDER — SODIUM CHLORIDE 0.9 % (FLUSH) 0.9 %
10 SYRINGE (ML) INJECTION
Status: DISCONTINUED | OUTPATIENT
Start: 2023-05-25 | End: 2023-05-25 | Stop reason: HOSPADM

## 2023-05-25 RX ADMIN — SODIUM CHLORIDE: 9 INJECTION, SOLUTION INTRAVENOUS at 10:05

## 2023-05-25 RX ADMIN — IRON SUCROSE 200 MG: 20 INJECTION, SOLUTION INTRAVENOUS at 10:05

## 2023-05-25 NOTE — PLAN OF CARE
Problem: Adult Inpatient Plan of Care  Goal: Patient-Specific Goal (Individualized)  Outcome: Ongoing, Progressing  Flowsheets (Taken 5/25/2023 1100)  Anxieties, Fears or Concerns: None  Individualized Care Needs: Recben viramontes     Problem: Fatigue  Goal: Improved Activity Tolerance  Intervention: Promote Improved Energy  Flowsheets (Taken 5/25/2023 1130)  Fatigue Management:   activity schedule adjusted   paced activity encouraged   fatigue-related activity identified   frequent rest breaks encouraged  Sleep/Rest Enhancement:   regular sleep/rest pattern promoted   relaxation techniques promoted  Activity Management: Up in chair - L3     Problem: Adult Inpatient Plan of Care  Goal: Plan of Care Review  Outcome: Ongoing, Progressing  Flowsheets (Taken 5/25/2023 1100)  Plan of Care Reviewed With: patient  Tolerated treatment with no known distress.  D/C from infusion center via W/C.

## 2023-05-29 RX ORDER — CALCIUM CITRATE/VITAMIN D3 200MG-6.25
TABLET ORAL
Qty: 50 STRIP | Refills: 1 | Status: SHIPPED | OUTPATIENT
Start: 2023-05-29

## 2023-06-01 DIAGNOSIS — R05.9 COUGH, UNSPECIFIED TYPE: ICD-10-CM

## 2023-06-01 RX ORDER — MONTELUKAST SODIUM 10 MG/1
10 TABLET ORAL NIGHTLY
Qty: 30 TABLET | Refills: 0 | Status: SHIPPED | OUTPATIENT
Start: 2023-06-01 | End: 2023-06-30

## 2023-06-01 RX ORDER — DULOXETIN HYDROCHLORIDE 60 MG/1
CAPSULE, DELAYED RELEASE ORAL
Qty: 60 CAPSULE | Refills: 3 | Status: SHIPPED | OUTPATIENT
Start: 2023-06-01 | End: 2023-08-23

## 2023-06-05 ENCOUNTER — OFFICE VISIT (OUTPATIENT)
Dept: ENDOCRINOLOGY | Facility: CLINIC | Age: 63
End: 2023-06-05
Payer: MEDICARE

## 2023-06-05 DIAGNOSIS — E66.01 CLASS 2 SEVERE OBESITY WITH SERIOUS COMORBIDITY AND BODY MASS INDEX (BMI) OF 38.0 TO 38.9 IN ADULT, UNSPECIFIED OBESITY TYPE: ICD-10-CM

## 2023-06-05 DIAGNOSIS — Z79.4 TYPE 2 DIABETES MELLITUS WITH DIABETIC POLYNEUROPATHY, WITH LONG-TERM CURRENT USE OF INSULIN: ICD-10-CM

## 2023-06-05 DIAGNOSIS — E11.65 TYPE 2 DIABETES MELLITUS WITH HYPERGLYCEMIA, WITH LONG-TERM CURRENT USE OF INSULIN: ICD-10-CM

## 2023-06-05 DIAGNOSIS — R80.9 TYPE 2 DIABETES MELLITUS WITH MICROALBUMINURIA, WITH LONG-TERM CURRENT USE OF INSULIN: Primary | ICD-10-CM

## 2023-06-05 DIAGNOSIS — E78.5 HYPERLIPIDEMIA, UNSPECIFIED HYPERLIPIDEMIA TYPE: ICD-10-CM

## 2023-06-05 DIAGNOSIS — I50.32 CHRONIC DIASTOLIC HEART FAILURE: ICD-10-CM

## 2023-06-05 DIAGNOSIS — Z79.4 TYPE 2 DIABETES MELLITUS WITH MICROALBUMINURIA, WITH LONG-TERM CURRENT USE OF INSULIN: Primary | ICD-10-CM

## 2023-06-05 DIAGNOSIS — E11.29 TYPE 2 DIABETES MELLITUS WITH MICROALBUMINURIA, WITH LONG-TERM CURRENT USE OF INSULIN: Primary | ICD-10-CM

## 2023-06-05 DIAGNOSIS — E11.42 TYPE 2 DIABETES MELLITUS WITH DIABETIC POLYNEUROPATHY, WITH LONG-TERM CURRENT USE OF INSULIN: ICD-10-CM

## 2023-06-05 DIAGNOSIS — Z79.4 TYPE 2 DIABETES MELLITUS WITH HYPERGLYCEMIA, WITH LONG-TERM CURRENT USE OF INSULIN: ICD-10-CM

## 2023-06-05 PROCEDURE — 3051F HG A1C>EQUAL 7.0%<8.0%: CPT | Mod: CPTII,95,, | Performed by: NURSE PRACTITIONER

## 2023-06-05 PROCEDURE — 99214 OFFICE O/P EST MOD 30 MIN: CPT | Mod: 95,,, | Performed by: NURSE PRACTITIONER

## 2023-06-05 PROCEDURE — 3066F PR DOCUMENTATION OF TREATMENT FOR NEPHROPATHY: ICD-10-PCS | Mod: CPTII,95,, | Performed by: NURSE PRACTITIONER

## 2023-06-05 PROCEDURE — 3066F NEPHROPATHY DOC TX: CPT | Mod: CPTII,95,, | Performed by: NURSE PRACTITIONER

## 2023-06-05 PROCEDURE — 3051F PR MOST RECENT HEMOGLOBIN A1C LEVEL 7.0 - < 8.0%: ICD-10-PCS | Mod: CPTII,95,, | Performed by: NURSE PRACTITIONER

## 2023-06-05 PROCEDURE — 3060F POS MICROALBUMINURIA REV: CPT | Mod: CPTII,95,, | Performed by: NURSE PRACTITIONER

## 2023-06-05 PROCEDURE — 3060F PR POS MICROALBUMINURIA RESULT DOCUMENTED/REVIEW: ICD-10-PCS | Mod: CPTII,95,, | Performed by: NURSE PRACTITIONER

## 2023-06-05 PROCEDURE — 99214 PR OFFICE/OUTPT VISIT, EST, LEVL IV, 30-39 MIN: ICD-10-PCS | Mod: 95,,, | Performed by: NURSE PRACTITIONER

## 2023-06-05 PROCEDURE — 4010F ACE/ARB THERAPY RXD/TAKEN: CPT | Mod: CPTII,95,, | Performed by: NURSE PRACTITIONER

## 2023-06-05 PROCEDURE — 4010F PR ACE/ARB THEARPY RXD/TAKEN: ICD-10-PCS | Mod: CPTII,95,, | Performed by: NURSE PRACTITIONER

## 2023-06-05 RX ORDER — INSULIN GLARGINE 100 [IU]/ML
42 INJECTION, SOLUTION SUBCUTANEOUS NIGHTLY
Qty: 5 EACH | Refills: 15
Start: 2023-06-05 | End: 2023-10-26

## 2023-06-05 RX ORDER — SEMAGLUTIDE 1.34 MG/ML
1 INJECTION, SOLUTION SUBCUTANEOUS
Qty: 3 ML | Refills: 11 | Status: SHIPPED | OUTPATIENT
Start: 2023-06-05 | End: 2023-06-27

## 2023-06-05 NOTE — PROGRESS NOTES
The patient location is: home  The chief complaint leading to consultation is: diabetes    Visit type: audiovisual    Face to Face time with patient:  15 mins  20 minutes of total time spent on the encounter, which includes face to face time and non-face to face time preparing to see the patient (eg, review of tests), Obtaining and/or reviewing separately obtained history, Documenting clinical information in the electronic or other health record, Independently interpreting results (not separately reported) and communicating results to the patient/family/caregiver, or Care coordination (not separately reported).     Each patient to whom he or she provides medical services by telemedicine is:  (1) informed of the relationship between the physician and patient and the respective role of any other health care provider with respect to management of the patient; and (2) notified that he or she may decline to receive medical services by telemedicine and may withdraw from such care at any time.    CC: This 63 y.o. female presents for management of diabetes  and chronic conditions pending review including , HLP, afib, BJ, CHF, morbid obesity    HPI: She was diagnosed with T2DM in the 1990s. Has  been hospitalized r/t DM for bg in the 500s.   Family hx of DM: daughter    Since last visit  Her granddaughter passed away on May 5th  She has been under a lot of stress   She has started dexcom - see download in Media tab  Time in range: 46%  Hypoglycemia < 3% - not patterned, suspect it's related to late meal dose insulin admin  Has large pp excursions, stress eating   Diet: Eats 2 Meals a day, snacks- fruit, Chex Mix  Skips breakfast normally  Exercise: in PT twice a week, for her back and legs   CURRENT DM MEDS: Lantus 45 u qhs; metformin 1000 mg bid,  Ozempic 0.5 mg weekly, Sunday, Novolog 8 u AC + correction   Vial/pen:  Uses pens  Glucometer type:  Jocelyn contour    Standards of Care:  Eye exam: 2022 - will be seeing   [place]    Cardiologist follows w Dr Williamson     ROS:   Gen: Appetite good,   Resp: no SOB or LEMOS   Cardiac: No palpitations, chest pain   GI: No nausea or vomiting, diarrhea, constipation   /GYN: 1+ nocturia, no burning or pain.   MS/Neuro: + numbness/ tingling in BLE;  speech clear  Psych: Denies drug/ETOH abuse, + depression.  Other systems: negative.    PE:  GENERAL: Well developed, well nourished.  PSYCH: AAOx3, appropriate mood and affect, pleasant expression, conversant, appears relaxed, well groomed.   EYES: Conjunctiva, corneas clear  NECK: Supple, trachea midline,   FOOT EXAMINATION: 2/2023      Personally reviewed Past Medical, Surgical, Social History.    There were no vitals taken for this visit.     Personally reviewed the below labs:      Chemistry        Component Value Date/Time     05/15/2023 1056    K 3.5 05/15/2023 1056     05/15/2023 1056    CO2 28 05/15/2023 1056    BUN 13 05/15/2023 1056    CREATININE 0.7 05/15/2023 1056     (H) 05/15/2023 1056        Component Value Date/Time    CALCIUM 8.5 (L) 05/15/2023 1056    ALKPHOS 124 05/15/2023 1056    AST 16 05/15/2023 1056    ALT 15 05/15/2023 1056    BILITOT 0.2 05/15/2023 1056    ESTGFRAFRICA >60 11/21/2019 0526    EGFRNONAA >60 11/21/2019 0526            Lab Results   Component Value Date    TSH 1.620 02/16/2023       Recent Labs   Lab 05/15/23  1056   LDL Cholesterol 87.0   HDL 34 L   Cholesterol 171        No results found for this or any previous visit.  No results found for this or any previous visit.    Lab Results   Component Value Date    MICALBCREAT 94.1 (H) 02/16/2023       Hemoglobin A1C   Date Value Ref Range Status   05/15/2023 7.8 (H) 4.0 - 5.6 % Final     Comment:     ADA Screening Guidelines:  5.7-6.4%  Consistent with prediabetes  >or=6.5%  Consistent with diabetes    High levels of fetal hemoglobin interfere with the HbA1C  assay. Heterozygous hemoglobin variants (HbS, HgC, etc)do  not significantly interfere  with this assay.   However, presence of multiple variants may affect accuracy.     02/16/2023 10.3 (H) 4.0 - 5.6 % Final     Comment:     ADA Screening Guidelines:  5.7-6.4%  Consistent with prediabetes  >or=6.5%  Consistent with diabetes    High levels of fetal hemoglobin interfere with the HbA1C  assay. Heterozygous hemoglobin variants (HbS, HgC, etc)do  not significantly interfere with this assay.   However, presence of multiple variants may affect accuracy.     11/20/2019 6.8 (H) 0.0 - 5.6 % Final     Comment:     Reference Interval:  5.0 - 5.6 Normal   5.7 - 6.4 High Risk   > 6.5 Diabetic    Hgb A1c results are standardized based on the (NGSP) National   Glycohemoglobin Standardization Program.    Hemoglobin A1C levels are related to mean serum/plasma glucose   during the preceding 2-3 months.             ASSESSMENT and PLAN:      1. T2DM with hyperglycemia, DM PN, microalbuminuria   Continue Lantus  45 u qhs,  Novolog 8 u AC +correction 150/25, metformin 1000 mg bid  Increase Ozempic to 1 mg weekly  Continue Dexcom G7- message me in 1-2 weeks to review or sooner for issues    2. HLP - on statin therapy,    3. H/o afib, CHF- follows w Dr Williamson, avoid TZD      4. Depression- referral to psychology, awaiting appt    5. Morbid obesity- activity limited r/t limited mobility and stress       Follow-up: in 3 months with lab prior

## 2023-06-08 ENCOUNTER — PATIENT MESSAGE (OUTPATIENT)
Dept: PRIMARY CARE CLINIC | Facility: CLINIC | Age: 63
End: 2023-06-08
Payer: MEDICARE

## 2023-06-08 DIAGNOSIS — M54.31 SCIATICA OF RIGHT SIDE: Primary | ICD-10-CM

## 2023-06-13 RX ORDER — QUETIAPINE FUMARATE 50 MG/1
50 TABLET, FILM COATED ORAL NIGHTLY
Qty: 30 TABLET | Refills: 1 | Status: SHIPPED | OUTPATIENT
Start: 2023-06-13 | End: 2023-06-27

## 2023-06-15 DIAGNOSIS — R05.9 COUGH, UNSPECIFIED TYPE: ICD-10-CM

## 2023-06-18 RX ORDER — BUDESONIDE AND FORMOTEROL FUMARATE DIHYDRATE 80; 4.5 UG/1; UG/1
AEROSOL RESPIRATORY (INHALATION)
Qty: 10.2 G | Refills: 0 | Status: SHIPPED | OUTPATIENT
Start: 2023-06-18 | End: 2023-07-20

## 2023-06-19 ENCOUNTER — PATIENT MESSAGE (OUTPATIENT)
Dept: PRIMARY CARE CLINIC | Facility: CLINIC | Age: 63
End: 2023-06-19

## 2023-06-21 ENCOUNTER — PATIENT MESSAGE (OUTPATIENT)
Dept: PRIMARY CARE CLINIC | Facility: CLINIC | Age: 63
End: 2023-06-21
Payer: MEDICARE

## 2023-06-22 LAB
LEFT EYE DM RETINOPATHY: NEGATIVE
RIGHT EYE DM RETINOPATHY: NEGATIVE

## 2023-06-27 ENCOUNTER — OFFICE VISIT (OUTPATIENT)
Dept: PRIMARY CARE CLINIC | Facility: CLINIC | Age: 63
End: 2023-06-27
Payer: MEDICARE

## 2023-06-27 VITALS
WEIGHT: 228.38 LBS | OXYGEN SATURATION: 95 % | HEART RATE: 99 BPM | SYSTOLIC BLOOD PRESSURE: 120 MMHG | BODY MASS INDEX: 43.16 KG/M2 | DIASTOLIC BLOOD PRESSURE: 64 MMHG | RESPIRATION RATE: 18 BRPM | TEMPERATURE: 98 F

## 2023-06-27 DIAGNOSIS — F32.A DEPRESSION, UNSPECIFIED DEPRESSION TYPE: Primary | ICD-10-CM

## 2023-06-27 DIAGNOSIS — M65.30 TRIGGER FINGER, UNSPECIFIED FINGER, UNSPECIFIED LATERALITY: ICD-10-CM

## 2023-06-27 DIAGNOSIS — R80.9 TYPE 2 DIABETES MELLITUS WITH MICROALBUMINURIA, WITH LONG-TERM CURRENT USE OF INSULIN: ICD-10-CM

## 2023-06-27 DIAGNOSIS — Z79.4 TYPE 2 DIABETES MELLITUS WITH MICROALBUMINURIA, WITH LONG-TERM CURRENT USE OF INSULIN: ICD-10-CM

## 2023-06-27 DIAGNOSIS — G47.00 INSOMNIA, UNSPECIFIED TYPE: ICD-10-CM

## 2023-06-27 DIAGNOSIS — K42.9 UMBILICAL HERNIA WITHOUT OBSTRUCTION AND WITHOUT GANGRENE: ICD-10-CM

## 2023-06-27 DIAGNOSIS — M47.816 OSTEOARTHRITIS OF LUMBAR SPINE, UNSPECIFIED SPINAL OSTEOARTHRITIS COMPLICATION STATUS: ICD-10-CM

## 2023-06-27 DIAGNOSIS — R92.8 ABNORMAL MAMMOGRAM: ICD-10-CM

## 2023-06-27 DIAGNOSIS — E11.29 TYPE 2 DIABETES MELLITUS WITH MICROALBUMINURIA, WITH LONG-TERM CURRENT USE OF INSULIN: ICD-10-CM

## 2023-06-27 PROCEDURE — 3008F BODY MASS INDEX DOCD: CPT | Mod: CPTII,S$GLB,, | Performed by: PHYSICIAN ASSISTANT

## 2023-06-27 PROCEDURE — 3078F DIAST BP <80 MM HG: CPT | Mod: CPTII,S$GLB,, | Performed by: PHYSICIAN ASSISTANT

## 2023-06-27 PROCEDURE — 3060F PR POS MICROALBUMINURIA RESULT DOCUMENTED/REVIEW: ICD-10-PCS | Mod: CPTII,S$GLB,, | Performed by: PHYSICIAN ASSISTANT

## 2023-06-27 PROCEDURE — 3078F PR MOST RECENT DIASTOLIC BLOOD PRESSURE < 80 MM HG: ICD-10-PCS | Mod: CPTII,S$GLB,, | Performed by: PHYSICIAN ASSISTANT

## 2023-06-27 PROCEDURE — 1159F MED LIST DOCD IN RCRD: CPT | Mod: CPTII,S$GLB,, | Performed by: PHYSICIAN ASSISTANT

## 2023-06-27 PROCEDURE — 99214 OFFICE O/P EST MOD 30 MIN: CPT | Mod: S$GLB,,, | Performed by: PHYSICIAN ASSISTANT

## 2023-06-27 PROCEDURE — 1160F PR REVIEW ALL MEDS BY PRESCRIBER/CLIN PHARMACIST DOCUMENTED: ICD-10-PCS | Mod: CPTII,S$GLB,, | Performed by: PHYSICIAN ASSISTANT

## 2023-06-27 PROCEDURE — 3051F HG A1C>EQUAL 7.0%<8.0%: CPT | Mod: CPTII,S$GLB,, | Performed by: PHYSICIAN ASSISTANT

## 2023-06-27 PROCEDURE — 3066F PR DOCUMENTATION OF TREATMENT FOR NEPHROPATHY: ICD-10-PCS | Mod: CPTII,S$GLB,, | Performed by: PHYSICIAN ASSISTANT

## 2023-06-27 PROCEDURE — 3008F PR BODY MASS INDEX (BMI) DOCUMENTED: ICD-10-PCS | Mod: CPTII,S$GLB,, | Performed by: PHYSICIAN ASSISTANT

## 2023-06-27 PROCEDURE — 4010F ACE/ARB THERAPY RXD/TAKEN: CPT | Mod: CPTII,S$GLB,, | Performed by: PHYSICIAN ASSISTANT

## 2023-06-27 PROCEDURE — 99214 PR OFFICE/OUTPT VISIT, EST, LEVL IV, 30-39 MIN: ICD-10-PCS | Mod: S$GLB,,, | Performed by: PHYSICIAN ASSISTANT

## 2023-06-27 PROCEDURE — 3060F POS MICROALBUMINURIA REV: CPT | Mod: CPTII,S$GLB,, | Performed by: PHYSICIAN ASSISTANT

## 2023-06-27 PROCEDURE — 1160F RVW MEDS BY RX/DR IN RCRD: CPT | Mod: CPTII,S$GLB,, | Performed by: PHYSICIAN ASSISTANT

## 2023-06-27 PROCEDURE — 3066F NEPHROPATHY DOC TX: CPT | Mod: CPTII,S$GLB,, | Performed by: PHYSICIAN ASSISTANT

## 2023-06-27 PROCEDURE — 3074F SYST BP LT 130 MM HG: CPT | Mod: CPTII,S$GLB,, | Performed by: PHYSICIAN ASSISTANT

## 2023-06-27 PROCEDURE — 1159F PR MEDICATION LIST DOCUMENTED IN MEDICAL RECORD: ICD-10-PCS | Mod: CPTII,S$GLB,, | Performed by: PHYSICIAN ASSISTANT

## 2023-06-27 PROCEDURE — 3074F PR MOST RECENT SYSTOLIC BLOOD PRESSURE < 130 MM HG: ICD-10-PCS | Mod: CPTII,S$GLB,, | Performed by: PHYSICIAN ASSISTANT

## 2023-06-27 PROCEDURE — 4010F PR ACE/ARB THEARPY RXD/TAKEN: ICD-10-PCS | Mod: CPTII,S$GLB,, | Performed by: PHYSICIAN ASSISTANT

## 2023-06-27 PROCEDURE — 3051F PR MOST RECENT HEMOGLOBIN A1C LEVEL 7.0 - < 8.0%: ICD-10-PCS | Mod: CPTII,S$GLB,, | Performed by: PHYSICIAN ASSISTANT

## 2023-06-27 RX ORDER — SEMAGLUTIDE 2.68 MG/ML
2 INJECTION, SOLUTION SUBCUTANEOUS
Qty: 3 ML | Refills: 11 | Status: SHIPPED | OUTPATIENT
Start: 2023-06-27 | End: 2023-10-02

## 2023-06-27 RX ORDER — TEMAZEPAM 30 MG/1
30 CAPSULE ORAL NIGHTLY PRN
Qty: 30 CAPSULE | Refills: 2 | Status: SHIPPED | OUTPATIENT
Start: 2023-06-27 | End: 2023-07-10 | Stop reason: SDUPTHER

## 2023-06-27 RX ORDER — BUPROPION HYDROCHLORIDE 150 MG/1
150 TABLET ORAL DAILY
Qty: 90 TABLET | Refills: 0 | Status: SHIPPED | OUTPATIENT
Start: 2023-06-27 | End: 2023-07-10 | Stop reason: SDUPTHER

## 2023-06-27 NOTE — PROGRESS NOTES
Subjective     Patient ID: Amanda Barton is a 63 y.o. female.    Chief Complaint: Follow-up    Patient is a 62 yo female who comes in today with multiple varied medical complaints.     Patient Active Problem List:     History of non-ST elevation myocardial infarction (NSTEMI)     BJ (obstructive sleep apnea)     Bacteremia due to methicillin resistant Staphylococcus aureus     Chronic diastolic heart failure     Diastolic dysfunction     Paroxysmal atrial fibrillation     Pericarditis     Cough     Sepsis     Febrile illness     Iron deficiency anemia     Troponin level elevated     Obesity     Chronic anticoagulation     Symptomatic anemia     Constipation     GI bleed     Type 2 diabetes mellitus with hyperglycemia, with long-term current use of insulin     Type 2 diabetes mellitus with diabetic polyneuropathy, with long-term current use of insulin     Type 2 diabetes mellitus with microalbuminuria, with long-term current use of insulin     Chronic obstructive pulmonary disease, unspecified COPD type     Insomnia     Osteoarthritis of lumbar spine       Depression  Visit Type: follow-up  Patient presents with the following symptoms: anhedonia, decreased concentration, depressed mood, feelings of hopelessness, feelings of worthlessness, irritability, muscle tension, nervousness/anxiety and obsessions.  Patient is not experiencing: palpitations, shortness of breath, suicidal ideas and suicidal planning.  Frequency of symptoms: constantly   Severity: causing significant distress   Sleep quality: poor  Compliance with medications:  51-75%  Side effects:  Weight gain (weight gain with seroquel)  Back Pain  This is a chronic problem. The current episode started more than 1 year ago. The problem occurs constantly. The problem is unchanged. The pain is present in the lumbar spine. The pain is severe. The pain is The same all the time. Pertinent negatives include no abdominal pain, chest pain, headaches or weakness.  Treatments tried: request a Back brace. Currenlty going thru PT.   Hand Pain   The incident occurred more than 1 week ago. There was no injury mechanism. Pain location: right index finger. The pain is mild. Pertinent negatives include no chest pain. Associated symptoms comments: Finger locks. The symptoms are aggravated by movement. She has tried nothing for the symptoms.   Review of Systems   Constitutional:  Positive for activity change, appetite change, fatigue and irritability.   HENT: Negative.     Eyes: Negative.    Respiratory:  Negative for chest tightness, shortness of breath and wheezing.    Cardiovascular:  Negative for chest pain, palpitations and leg swelling.   Gastrointestinal:  Negative for abdominal pain, constipation, nausea and reflux.   Endocrine: Negative.    Genitourinary: Negative.    Musculoskeletal:  Positive for back pain.   Integumentary:  Negative.   Neurological:  Negative for dizziness, tremors, seizures, weakness and headaches.   Psychiatric/Behavioral:  Positive for decreased concentration and depression. Negative for suicidal ideas. The patient is nervous/anxious.    Past Medical History:   Diagnosis Date    Acute bacterial pericarditis 12/02/2017    Anemia     Bacteremia due to methicillin resistant Staphylococcus aureus 11/20/2017    Diabetes mellitus     Encounter for blood transfusion     GERD (gastroesophageal reflux disease)     Heart murmur     History of pneumonia     History of UTI     Hypertension     Migraine headache     Sleep apnea     no machine yet    Type 2 diabetes mellitus without complication, with long-term current use of insulin 11/19/2017          Objective     Physical Exam  Vitals reviewed.   Constitutional:       General: She is not in acute distress.     Appearance: Normal appearance. She is not ill-appearing, toxic-appearing or diaphoretic.   HENT:      Head: Normocephalic and atraumatic.   Cardiovascular:      Rate and Rhythm: Normal rate and regular  rhythm.      Pulses: Normal pulses.      Heart sounds: Normal heart sounds. No murmur heard.    No friction rub.   Pulmonary:      Effort: Pulmonary effort is normal. No respiratory distress.      Breath sounds: Normal breath sounds. No stridor. No wheezing, rhonchi or rales.   Chest:      Chest wall: No tenderness.   Abdominal:      Palpations: Abdomen is soft.      Tenderness: There is no abdominal tenderness.   Musculoskeletal:         General: Swelling present.      Cervical back: No rigidity or tenderness.   Lymphadenopathy:      Cervical: No cervical adenopathy.   Skin:     General: Skin is warm and dry.      Coloration: Skin is not jaundiced.   Neurological:      Mental Status: She is alert.   Psychiatric:         Mood and Affect: Mood is depressed. Affect is tearful.         Speech: Speech normal.         Behavior: Behavior normal.     Lab Results   Component Value Date    WBC 8.96 05/01/2023    HGB 11.2 (L) 05/01/2023    HCT 35.6 (L) 05/01/2023    MCV 82 05/01/2023     05/01/2023     CMP  Sodium   Date Value Ref Range Status   05/15/2023 144 136 - 145 mmol/L Final     Potassium   Date Value Ref Range Status   05/15/2023 3.5 3.5 - 5.1 mmol/L Final     Chloride   Date Value Ref Range Status   05/15/2023 106 95 - 110 mmol/L Final     CO2   Date Value Ref Range Status   05/15/2023 28 23 - 29 mmol/L Final     Glucose   Date Value Ref Range Status   05/15/2023 146 (H) 70 - 110 mg/dL Final     BUN   Date Value Ref Range Status   05/15/2023 13 8 - 23 mg/dL Final     Creatinine   Date Value Ref Range Status   05/15/2023 0.7 0.5 - 1.4 mg/dL Final     Calcium   Date Value Ref Range Status   05/15/2023 8.5 (L) 8.7 - 10.5 mg/dL Final     Total Protein   Date Value Ref Range Status   05/15/2023 6.5 6.0 - 8.4 g/dL Final     Albumin   Date Value Ref Range Status   05/15/2023 3.3 (L) 3.5 - 5.2 g/dL Final     Total Bilirubin   Date Value Ref Range Status   05/15/2023 0.2 0.1 - 1.0 mg/dL Final     Comment:     For  infants and newborns, interpretation of results should be based  on gestational age, weight and in agreement with clinical  observations.    Premature Infant recommended reference ranges:  Up to 24 hours.............<8.0 mg/dL  Up to 48 hours............<12.0 mg/dL  3-5 days..................<15.0 mg/dL  6-29 days.................<15.0 mg/dL       Alkaline Phosphatase   Date Value Ref Range Status   05/15/2023 124 55 - 135 U/L Final     AST   Date Value Ref Range Status   05/15/2023 16 10 - 40 U/L Final     ALT   Date Value Ref Range Status   05/15/2023 15 10 - 44 U/L Final     Anion Gap   Date Value Ref Range Status   05/15/2023 10 8 - 16 mmol/L Final     eGFR if    Date Value Ref Range Status   11/21/2019 >60 >60 mL/min/1.73 m^2 Final     eGFR if non    Date Value Ref Range Status   11/21/2019 >60 >60 mL/min/1.73 m^2 Final     Comment:     Calculation used to obtain the estimated glomerular filtration  rate (eGFR) is the CKD-EPI equation.        Lab Results   Component Value Date    CHOL 171 05/15/2023     Lab Results   Component Value Date    HDL 34 (L) 05/15/2023     Lab Results   Component Value Date    LDLCALC 87.0 05/15/2023     Lab Results   Component Value Date    TRIG 250 (H) 05/15/2023     Lab Results   Component Value Date    CHOLHDL 19.9 (L) 05/15/2023     Lab Results   Component Value Date    HGBA1C 7.8 (H) 05/15/2023           Assessment and Plan     1. Depression, unspecified depression type  -     buPROPion (WELLBUTRIN XL) 150 MG TB24 tablet; Take 1 tablet (150 mg total) by mouth once daily.  Dispense: 90 tablet; Refill: 0    2. Umbilical hernia without obstruction and without gangrene  -     Ambulatory referral/consult to General Surgery; Future; Expected date: 07/04/2023    3. Type 2 diabetes mellitus with microalbuminuria, with long-term current use of insulin  -     semaglutide (OZEMPIC) 2 mg/dose (8 mg/3 mL) PnIj; Inject 2 mg into the skin every 7 days.   Dispense: 3 mL; Refill: 11    4. Insomnia, unspecified type  -     temazepam (RESTORIL) 30 mg capsule; Take 1 capsule (30 mg total) by mouth nightly as needed for Insomnia.  Dispense: 30 capsule; Refill: 2    5. Abnormal mammogram  -     US Breast Bilateral Complete; Future; Expected date: 06/27/2023  -     Mammo Digital Diagnostic Bilat; Future; Expected date: 06/27/2023    6. Trigger finger, unspecified finger, unspecified laterality  -     Ambulatory referral/consult to Orthopedics; Future; Expected date: 07/04/2023    7. Osteoarthritis of lumbar spine, unspecified spinal osteoarthritis complication status  -     Back/Cervical Brace For Home Use      The patient is asked to make an attempt to improve diet and exercise patterns to aid in medical management of this problem.   Follow up in 3 months  I spent 30 minutes on this encounter, time includes face-to-face, chart review, documentation, test review and orders.

## 2023-06-30 DIAGNOSIS — R05.9 COUGH, UNSPECIFIED TYPE: ICD-10-CM

## 2023-06-30 RX ORDER — MONTELUKAST SODIUM 10 MG/1
10 TABLET ORAL NIGHTLY
Qty: 30 TABLET | Refills: 0 | Status: SHIPPED | OUTPATIENT
Start: 2023-06-30 | End: 2023-07-29

## 2023-06-30 RX ORDER — DOXEPIN HYDROCHLORIDE 50 MG/1
CAPSULE ORAL
Qty: 30 CAPSULE | Refills: 3 | OUTPATIENT
Start: 2023-06-30

## 2023-07-10 ENCOUNTER — PATIENT MESSAGE (OUTPATIENT)
Dept: PRIMARY CARE CLINIC | Facility: CLINIC | Age: 63
End: 2023-07-10
Payer: MEDICARE

## 2023-07-10 DIAGNOSIS — G47.00 INSOMNIA, UNSPECIFIED TYPE: ICD-10-CM

## 2023-07-10 DIAGNOSIS — R22.0 INTRACRANIAL SWELLING: Primary | ICD-10-CM

## 2023-07-10 DIAGNOSIS — Z78.9 AMERICAN DIABETES ASSOCIATION (ADA) 1100 CALORIE DIET: ICD-10-CM

## 2023-07-10 DIAGNOSIS — F32.A DEPRESSION, UNSPECIFIED DEPRESSION TYPE: ICD-10-CM

## 2023-07-10 DIAGNOSIS — E29.1 3-OXO-5 ALPHA-STEROID DELTA 4-DEHYDROGENASE DEFICIENCY: ICD-10-CM

## 2023-07-10 DIAGNOSIS — R05.9 COUGH, UNSPECIFIED TYPE: ICD-10-CM

## 2023-07-11 ENCOUNTER — HOSPITAL ENCOUNTER (OUTPATIENT)
Dept: RADIOLOGY | Facility: HOSPITAL | Age: 63
Discharge: HOME | End: 2023-07-11
Attending: PHYSICIAN ASSISTANT
Payer: MEDICARE

## 2023-07-11 ENCOUNTER — OFFICE VISIT (OUTPATIENT)
Dept: ORTHOPEDICS | Facility: CLINIC | Age: 63
End: 2023-07-11
Payer: MEDICARE

## 2023-07-11 DIAGNOSIS — R92.8 ABNORMAL MAMMOGRAM: ICD-10-CM

## 2023-07-11 DIAGNOSIS — M65.321 TRIGGER INDEX FINGER OF RIGHT HAND: Primary | ICD-10-CM

## 2023-07-11 DIAGNOSIS — M65.30 TRIGGER FINGER, UNSPECIFIED FINGER, UNSPECIFIED LATERALITY: ICD-10-CM

## 2023-07-11 PROCEDURE — 99999 PR PBB SHADOW E&M-EST. PATIENT-LVL II: CPT | Mod: PBBFAC,,, | Performed by: PHYSICIAN ASSISTANT

## 2023-07-11 PROCEDURE — 4010F ACE/ARB THERAPY RXD/TAKEN: CPT | Mod: CPTII,S$GLB,ICN, | Performed by: PHYSICIAN ASSISTANT

## 2023-07-11 PROCEDURE — 3051F PR MOST RECENT HEMOGLOBIN A1C LEVEL 7.0 - < 8.0%: ICD-10-PCS | Mod: CPTII,S$GLB,ICN, | Performed by: PHYSICIAN ASSISTANT

## 2023-07-11 PROCEDURE — 77062 BREAST TOMOSYNTHESIS BI: CPT | Mod: 26,,, | Performed by: RADIOLOGY

## 2023-07-11 PROCEDURE — 99214 PR OFFICE/OUTPT VISIT, EST, LEVL IV, 30-39 MIN: ICD-10-PCS | Mod: S$GLB,ICN,, | Performed by: PHYSICIAN ASSISTANT

## 2023-07-11 PROCEDURE — 77066 MAMMO DIGITAL DIAGNOSTIC BILAT WITH TOMO: ICD-10-PCS | Mod: 26,,, | Performed by: RADIOLOGY

## 2023-07-11 PROCEDURE — 3060F PR POS MICROALBUMINURIA RESULT DOCUMENTED/REVIEW: ICD-10-PCS | Mod: CPTII,S$GLB,ICN, | Performed by: PHYSICIAN ASSISTANT

## 2023-07-11 PROCEDURE — 3066F NEPHROPATHY DOC TX: CPT | Mod: CPTII,S$GLB,ICN, | Performed by: PHYSICIAN ASSISTANT

## 2023-07-11 PROCEDURE — 4010F PR ACE/ARB THEARPY RXD/TAKEN: ICD-10-PCS | Mod: CPTII,S$GLB,ICN, | Performed by: PHYSICIAN ASSISTANT

## 2023-07-11 PROCEDURE — 77066 DX MAMMO INCL CAD BI: CPT | Mod: 26,,, | Performed by: RADIOLOGY

## 2023-07-11 PROCEDURE — 99999 PR PBB SHADOW E&M-EST. PATIENT-LVL II: ICD-10-PCS | Mod: PBBFAC,,, | Performed by: PHYSICIAN ASSISTANT

## 2023-07-11 PROCEDURE — 99214 OFFICE O/P EST MOD 30 MIN: CPT | Mod: S$GLB,ICN,, | Performed by: PHYSICIAN ASSISTANT

## 2023-07-11 PROCEDURE — 77062 MAMMO DIGITAL DIAGNOSTIC BILAT WITH TOMO: ICD-10-PCS | Mod: 26,,, | Performed by: RADIOLOGY

## 2023-07-11 PROCEDURE — 1160F RVW MEDS BY RX/DR IN RCRD: CPT | Mod: CPTII,S$GLB,ICN, | Performed by: PHYSICIAN ASSISTANT

## 2023-07-11 PROCEDURE — 1160F PR REVIEW ALL MEDS BY PRESCRIBER/CLIN PHARMACIST DOCUMENTED: ICD-10-PCS | Mod: CPTII,S$GLB,ICN, | Performed by: PHYSICIAN ASSISTANT

## 2023-07-11 PROCEDURE — 77062 BREAST TOMOSYNTHESIS BI: CPT | Mod: TC,PO

## 2023-07-11 PROCEDURE — 1159F PR MEDICATION LIST DOCUMENTED IN MEDICAL RECORD: ICD-10-PCS | Mod: CPTII,S$GLB,ICN, | Performed by: PHYSICIAN ASSISTANT

## 2023-07-11 PROCEDURE — 3051F HG A1C>EQUAL 7.0%<8.0%: CPT | Mod: CPTII,S$GLB,ICN, | Performed by: PHYSICIAN ASSISTANT

## 2023-07-11 PROCEDURE — 3066F PR DOCUMENTATION OF TREATMENT FOR NEPHROPATHY: ICD-10-PCS | Mod: CPTII,S$GLB,ICN, | Performed by: PHYSICIAN ASSISTANT

## 2023-07-11 PROCEDURE — 3060F POS MICROALBUMINURIA REV: CPT | Mod: CPTII,S$GLB,ICN, | Performed by: PHYSICIAN ASSISTANT

## 2023-07-11 PROCEDURE — 1159F MED LIST DOCD IN RCRD: CPT | Mod: CPTII,S$GLB,ICN, | Performed by: PHYSICIAN ASSISTANT

## 2023-07-11 NOTE — PROGRESS NOTES
2023    Chief Complaint:  Chief Complaint   Patient presents with    Right Hand - Pain     Index and middle trigger finger       HPI:  Amanda Barton is a 63 y.o. female, who presents to clinic today for evaluation of her right index finger locking and pain.  States symptoms started approximally 2-3 months ago.  States symptoms have been progressively worsening over the past 3 weeks.  States pain can reach up to 7/10.  States pain is worse with activity.  States pain is better with rest.  States pain is worse with locking/triggering of the right index finger.  States he is also begun to notice locking and triggering of the right middle finger, but it is very mild.  Denies any acute injuries.  Denies any paresthesias.  Denies any other complaints at this time.    PMHX:  Past Medical History:   Diagnosis Date    Acute bacterial pericarditis 2017    Anemia     Bacteremia due to methicillin resistant Staphylococcus aureus 2017    Diabetes mellitus     Encounter for blood transfusion     GERD (gastroesophageal reflux disease)     Heart murmur     History of pneumonia     History of UTI     Hypertension     Migraine headache     Sleep apnea     no machine yet    Type 2 diabetes mellitus without complication, with long-term current use of insulin 2017       PSHX:  Past Surgical History:   Procedure Laterality Date    APPENDECTOMY      CATHETERIZATION OF BOTH LEFT AND RIGHT HEART N/A 2023    Procedure: Right and Left Heart Cath;  Surgeon: Osman Williamson MD;  Location: [facility] CATH;  Service: Cardiology;  Laterality: N/A;     SECTION      CHOLECYSTECTOMY      COLONOSCOPY N/A 2017    Procedure: COLONOSCOPY;  Surgeon: Juan Lepe MD;  Location: [facility] ENDO;  Service: Endoscopy;  Laterality: N/A;    ESOPHAGOGASTRODUODENOSCOPY N/A 2019    Procedure: EGD (ESOPHAGOGASTRODUODENOSCOPY);  Surgeon: Gustavo Austin MD;  Location: [facility] ENDO;  Service: Endoscopy;  Laterality: N/A;   with Push Enteroscopy    ESOPHAGOGASTRODUODENOSCOPY N/A 11/22/2019    Procedure: EGD (ESOPHAGOGASTRODUODENOSCOPY);  Surgeon: Gustavo Austin MD;  Location: [facility];  Service: Endoscopy;  Laterality: N/A;  Push enteroscopy    HYSTERECTOMY      IRRIGATION AND DEBRIDEMENT OF LUMBAR SPINE  01/2022    LUMBAR FUSION  11/2021    L4-L5       FMHX:  Family History   Problem Relation Age of Onset    No Known Problems Mother     Heart disease Father     Heart failure Father     Arrhythmia Father        SOCHX:  Social History     Tobacco Use    Smoking status: Never     Passive exposure: Never    Smokeless tobacco: Never   Substance Use Topics    Alcohol use: No       ALLERGIES:  Ciprofloxacin    CURRENT MEDICATIONS:  Current Outpatient Medications on File Prior to Visit   Medication Sig Dispense Refill    ALPRAZolam (XANAX) 1 MG tablet TAKE 1 TABLET (1 MG TOTAL) BY MOUTH 2 (TWO) TIMES DAILY. 60 tablet 2    buPROPion (WELLBUTRIN XL) 150 MG TB24 tablet Take 1 tablet (150 mg total) by mouth once daily. 90 tablet 0    carvediloL (COREG) 3.125 MG tablet Take 3.125 mg by mouth 2 (two) times daily with meals.      desloratadine (CLARINEX) 5 mg tablet Take 1 tablet (5 mg total) by mouth once daily. 30 tablet 11    DULoxetine (CYMBALTA) 60 MG capsule TAKE ONE CAPSULE BY MOUTH TWICE DAILY 60 capsule 3    ergocalciferol (ERGOCALCIFEROL) 50,000 unit Cap Take 50,000 Units by mouth every 7 days.      furosemide (LASIX) 40 MG tablet Take 40 mg by mouth 2 (two) times daily.      ibuprofen (ADVIL,MOTRIN) 800 MG tablet Take 800 mg by mouth 3 (three) times daily as needed. As needed      insulin (LANTUS SOLOSTAR U-100 INSULIN) glargine 100 units/mL SubQ pen Inject 42 Units into the skin every evening. 5 each 15    insulin aspart U-100 (NOVOLOG FLEXPEN U-100 INSULIN) 100 unit/mL (3 mL) InPn pen 8 u Ac + correction  Max TDD 54u 6 each 12    losartan (COZAAR) 50 MG tablet Take 50 mg by mouth once daily.      metFORMIN (GLUCOPHAGE) 1000 MG  "tablet Take 1 tablet by mouth 2 (two) times daily.  1    montelukast (SINGULAIR) 10 mg tablet TAKE 1 TABLET (10 MG TOTAL) BY MOUTH EVERY EVENING. 30 tablet 0    oxybutynin (DITROPAN-XL) 10 MG 24 hr tablet Take 1 tablet (10 mg total) by mouth once daily. 30 tablet 11    oxyCODONE-acetaminophen (PERCOCET) 5-325 mg per tablet Take 1 tablet by mouth every 4 to 6 hours as needed for Pain. 27 tablet 0    pantoprazole (PROTONIX) 40 MG tablet Take 1 tablet (40 mg total) by mouth once daily. 30 tablet 11    pen needle, diabetic (BD KARYN 2ND GEN PEN NEEDLE) 32 gauge x 5/32" Ndle Checks bg 4 times a day 150 each 12    pregabalin (LYRICA) 150 MG capsule Take 150 mg by mouth 3 (three) times daily.      promethazine (PHENERGAN) 25 MG tablet Take 25 mg by mouth before meals as needed.      rOPINIRole (REQUIP) 2 MG tablet Take 2 mg by mouth 2 (two) times daily.      rosuvastatin (CRESTOR) 20 MG tablet Take 20 mg by mouth every evening.      semaglutide (OZEMPIC) 2 mg/dose (8 mg/3 mL) PnIj Inject 2 mg into the skin every 7 days. 3 mL 11    spironolactone (ALDACTONE) 50 MG tablet Take 50 mg by mouth once daily.      SYMBICORT 80-4.5 mcg/actuation HFAA INHALE 2 PUFFS INTO THE LUNGS TWICE DAILY. **CONTROLLER** 10.2 g 0    temazepam (RESTORIL) 30 mg capsule Take 1 capsule (30 mg total) by mouth nightly as needed for Insomnia. 30 capsule 2    TENS units Cristiana 1 Units by Misc.(Non-Drug; Combo Route) route daily as needed. 1 each 0    tiZANidine (ZANAFLEX) 4 MG tablet Take 4 mg by mouth 3 (three) times daily. Take three times daily      TRUE METRIX GLUCOSE TEST STRIP Strp USE TO TEST BLOOD SUGAR TWICE DAILY 50 strip 1     No current facility-administered medications on file prior to visit.       REVIEW OF SYSTEMS:  Review of Systems   Constitutional: Negative.    HENT: Negative.     Eyes: Negative.    Respiratory: Negative.     Cardiovascular: Negative.    Gastrointestinal: Negative.    Genitourinary: Negative.    Musculoskeletal:  Positive " for joint pain.   Skin: Negative.    Neurological:  Positive for weakness.   Endo/Heme/Allergies: Negative.    Psychiatric/Behavioral: Negative.       GENERAL PHYSICAL EXAM:   There were no vitals taken for this visit.   GEN: well developed, well nourished, no acute distress   HENT: Normocephalic, atraumatic   EYES: No discharge, conjunctiva normal   NECK: Supple, non-tender   PULM: No wheezing, no respiratory distress   CV: RRR   ABD: Soft, non-tender    ORTHO EXAM:   Examination of the right hand/wrist reveals no edema, erythema, ecchymosis, or skin breakdown.  Marked tenderness to palpation over the area of the A1 pulley of the right index finger.  Minimal tenderness palpation over the area of the A1 pulley of the right middle finger.  No tenderness noted throughout the remainder of the right hand/wrist.  No active triggering noted of any of the fingers, but significant tenderness with forced flexion of the right index finger.  Strength not tested secondary to pain.  Normal sensation in the radial, ulnar, median nerve distributions.  Capillary refill less than 2 seconds in all fingers.    RADIOLOGY:   None.    ASSESSMENT:   Right index finger trigger finger    PLAN:  1. I discussed with Amanda Barton and her daughter the trigger finger pathology and treatment options in detail during today's visit.  After treatment options were discussed, we decided the best course of action this time is to proceed with a steroid injection into the flexor tendon sheath at the level A1 pulley of the right index finger in clinic today.  We did discuss she is had increased risk from the injection of increasing her blood glucose levels.  We discussed the importance of monitoring her blood glucose levels closely over the next 3-5 days, and for any uncontrolled levels to report to nearest emergency department.  She verbally agreed with the treatment plan.      2. Informed consent was obtained.  After an alcohol prep followed by a  chlorhexidine prep, a steroid injection was placed into the flexor tendon sheath at the level A1 pulley of the right index finger.  She tolerated the procedure well with no immediate complications.      3. I would like him to follow up in clinic on a p.r.n. basis for any worsening of her symptoms or for any hand, wrist, or elbow problems/concerns.  She was instructed to contact clinic for any problems or concerns in the interim.

## 2023-07-12 RX ORDER — TEMAZEPAM 30 MG/1
30 CAPSULE ORAL NIGHTLY PRN
Qty: 30 CAPSULE | Refills: 2 | Status: SHIPPED | OUTPATIENT
Start: 2023-07-12 | End: 2023-10-02 | Stop reason: SDUPTHER

## 2023-07-12 RX ORDER — PREGABALIN 150 MG/1
150 CAPSULE ORAL 3 TIMES DAILY
Qty: 270 CAPSULE | Refills: 0 | Status: SHIPPED | OUTPATIENT
Start: 2023-07-12 | End: 2023-10-02 | Stop reason: SDUPTHER

## 2023-07-12 RX ORDER — BUPROPION HYDROCHLORIDE 150 MG/1
150 TABLET ORAL DAILY
Qty: 90 TABLET | Refills: 0 | Status: SHIPPED | OUTPATIENT
Start: 2023-07-12 | End: 2023-09-25

## 2023-07-12 RX ORDER — SPIRONOLACTONE 50 MG/1
50 TABLET, FILM COATED ORAL DAILY
Qty: 360 TABLET | Refills: 0 | Status: SHIPPED | OUTPATIENT
Start: 2023-07-12 | End: 2023-10-02 | Stop reason: SDUPTHER

## 2023-07-27 ENCOUNTER — LAB VISIT (OUTPATIENT)
Dept: LAB | Facility: HOSPITAL | Age: 63
End: 2023-07-27
Attending: NURSE PRACTITIONER
Payer: MEDICARE

## 2023-07-27 DIAGNOSIS — Z79.4 ENCOUNTER FOR LONG-TERM (CURRENT) USE OF INSULIN: ICD-10-CM

## 2023-07-27 DIAGNOSIS — E11.00 TYPE II DIABETES MELLITUS WITH HYPEROSMOLARITY, UNCONTROLLED: ICD-10-CM

## 2023-07-27 LAB
ESTIMATED AVG GLUCOSE: 146 MG/DL (ref 68–131)
HBA1C MFR BLD: 6.7 % (ref 4–5.6)

## 2023-07-27 PROCEDURE — 36415 COLL VENOUS BLD VENIPUNCTURE: CPT | Mod: PO | Performed by: NURSE PRACTITIONER

## 2023-07-27 PROCEDURE — 83036 HEMOGLOBIN GLYCOSYLATED A1C: CPT | Performed by: NURSE PRACTITIONER

## 2023-07-28 DIAGNOSIS — R05.9 COUGH, UNSPECIFIED TYPE: ICD-10-CM

## 2023-07-29 RX ORDER — MONTELUKAST SODIUM 10 MG/1
10 TABLET ORAL NIGHTLY
Qty: 30 TABLET | Refills: 0 | Status: SHIPPED | OUTPATIENT
Start: 2023-07-29 | End: 2023-08-30

## 2023-08-01 ENCOUNTER — PATIENT MESSAGE (OUTPATIENT)
Dept: ENDOCRINOLOGY | Facility: CLINIC | Age: 63
End: 2023-08-01
Payer: MEDICARE

## 2023-08-01 ENCOUNTER — TELEPHONE (OUTPATIENT)
Dept: ENDOCRINOLOGY | Facility: CLINIC | Age: 63
End: 2023-08-01
Payer: MEDICARE

## 2023-08-01 NOTE — TELEPHONE ENCOUNTER
"S/w pt, she needed to r/s her 8/4 appt with you and now will be seen 8/28 (wanted in-person only).  "Sugars have been all over the place".  Asking if you can review Dexcom.  In Media.   "

## 2023-08-01 NOTE — TELEPHONE ENCOUNTER
----- Message from Karmen Kohler sent at 8/1/2023  3:11 PM CDT -----  Contact: self  Patient has an appt on 8/3/23 that she needs to move but it can't be too far out due to sugar issues, please call back at [phone] and thanks

## 2023-08-02 NOTE — TELEPHONE ENCOUNTER
"Pt advised of Ms. Stack's msg "Please have her decrease lantus to 40 units for now;" pt verb understanding.   "

## 2023-08-12 ENCOUNTER — PATIENT MESSAGE (OUTPATIENT)
Dept: PRIMARY CARE CLINIC | Facility: CLINIC | Age: 63
End: 2023-08-12
Payer: MEDICARE

## 2023-08-17 ENCOUNTER — OFFICE VISIT (OUTPATIENT)
Dept: FAMILY MEDICINE | Facility: CLINIC | Age: 63
End: 2023-08-17
Payer: MEDICARE

## 2023-08-17 DIAGNOSIS — R30.0 DYSURIA: Primary | ICD-10-CM

## 2023-08-17 DIAGNOSIS — R05.9 COUGH, UNSPECIFIED TYPE: ICD-10-CM

## 2023-08-17 PROCEDURE — 3044F PR MOST RECENT HEMOGLOBIN A1C LEVEL <7.0%: ICD-10-PCS | Mod: CPTII,95,, | Performed by: FAMILY MEDICINE

## 2023-08-17 PROCEDURE — 3060F POS MICROALBUMINURIA REV: CPT | Mod: CPTII,95,, | Performed by: FAMILY MEDICINE

## 2023-08-17 PROCEDURE — 3066F PR DOCUMENTATION OF TREATMENT FOR NEPHROPATHY: ICD-10-PCS | Mod: CPTII,95,, | Performed by: FAMILY MEDICINE

## 2023-08-17 PROCEDURE — 1159F MED LIST DOCD IN RCRD: CPT | Mod: CPTII,95,, | Performed by: FAMILY MEDICINE

## 2023-08-17 PROCEDURE — 1159F PR MEDICATION LIST DOCUMENTED IN MEDICAL RECORD: ICD-10-PCS | Mod: CPTII,95,, | Performed by: FAMILY MEDICINE

## 2023-08-17 PROCEDURE — 1160F PR REVIEW ALL MEDS BY PRESCRIBER/CLIN PHARMACIST DOCUMENTED: ICD-10-PCS | Mod: CPTII,95,, | Performed by: FAMILY MEDICINE

## 2023-08-17 PROCEDURE — 99213 PR OFFICE/OUTPT VISIT, EST, LEVL III, 20-29 MIN: ICD-10-PCS | Mod: 95,,, | Performed by: FAMILY MEDICINE

## 2023-08-17 PROCEDURE — 1160F RVW MEDS BY RX/DR IN RCRD: CPT | Mod: CPTII,95,, | Performed by: FAMILY MEDICINE

## 2023-08-17 PROCEDURE — 4010F ACE/ARB THERAPY RXD/TAKEN: CPT | Mod: CPTII,95,, | Performed by: FAMILY MEDICINE

## 2023-08-17 PROCEDURE — 3066F NEPHROPATHY DOC TX: CPT | Mod: CPTII,95,, | Performed by: FAMILY MEDICINE

## 2023-08-17 PROCEDURE — 4010F PR ACE/ARB THEARPY RXD/TAKEN: ICD-10-PCS | Mod: CPTII,95,, | Performed by: FAMILY MEDICINE

## 2023-08-17 PROCEDURE — 3044F HG A1C LEVEL LT 7.0%: CPT | Mod: CPTII,95,, | Performed by: FAMILY MEDICINE

## 2023-08-17 PROCEDURE — 99213 OFFICE O/P EST LOW 20 MIN: CPT | Mod: 95,,, | Performed by: FAMILY MEDICINE

## 2023-08-17 PROCEDURE — 3060F PR POS MICROALBUMINURIA RESULT DOCUMENTED/REVIEW: ICD-10-PCS | Mod: CPTII,95,, | Performed by: FAMILY MEDICINE

## 2023-08-17 RX ORDER — SULFAMETHOXAZOLE AND TRIMETHOPRIM 800; 160 MG/1; MG/1
1 TABLET ORAL 2 TIMES DAILY
Qty: 14 TABLET | Refills: 0 | Status: SHIPPED | OUTPATIENT
Start: 2023-08-17 | End: 2023-08-24

## 2023-08-17 NOTE — PROGRESS NOTES
Subjective:       Patient ID: Amanda Barton is a 63 y.o. female.    Chief Complaint: Dysuria    The patient location is: [place]  The chief complaint leading to consultation is: Dysuria    Visit type: audiovisual    Face to Face time with patient: 15  20 minutes of total time spent on the encounter, which includes face to face time and non-face to face time preparing to see the patient (eg, review of tests), Obtaining and/or reviewing separately obtained history, Documenting clinical information in the electronic or other health record, Independently interpreting results (not separately reported) and communicating results to the patient/family/caregiver, or Care coordination (not separately reported).     Each patient to whom he or she provides medical services by telemedicine is:  (1) informed of the relationship between the physician and patient and the respective role of any other health care provider with respect to management of the patient; and (2) notified that he or she may decline to receive medical services by telemedicine and may withdraw from such care at any time.     Here today for an acute visit.  She is a patient of MALIK MORALES, new to me today.  She has active medical issues of A. Fib, CHF, CAD, Anemia, Diabetes, BJ and COPD.  She is here today c/o UTI symptoms x 1-2 weeks.  She has dysuria, increased frequency.   She had a UTI in May and was treated with Macrobid      Dysuria   This is a recurrent problem. The current episode started 1 to 4 weeks ago. The problem occurs every urination. The problem has been gradually worsening. The quality of the pain is described as shooting. The pain is at a severity of 9/10. The pain is moderate. There has been no fever. There is A history of pyelonephritis. Associated symptoms include flank pain. Pertinent negatives include no behavior changes, chills, discharge, frequency, hematuria, hesitancy, nausea, possible pregnancy, sweats, urgency, vomiting,  weight loss, constipation, rash or withholding. She has tried antibiotics for the symptoms. The treatment provided mild relief. Her past medical history is significant for recurrent UTIs. There is no history of catheterization, diabetes insipidus, diabetes mellitus, genitourinary reflux, hypertension, kidney stones, a single kidney, STD, urinary stasis or a urological procedure.     Review of Systems   Constitutional:  Negative for chills and weight loss.   Gastrointestinal:  Negative for constipation, nausea and vomiting.   Genitourinary:  Positive for dysuria and flank pain. Negative for frequency, hematuria, hesitancy and urgency.   Skin:  Negative for rash.       Objective:      There were no vitals filed for this visit.   Physical Exam  Constitutional:       Appearance: Normal appearance.   Neurological:      General: No focal deficit present.      Mental Status: She is alert and oriented to person, place, and time.   Psychiatric:         Mood and Affect: Mood normal.         Behavior: Behavior normal.         Thought Content: Thought content normal.         Judgment: Judgment normal.         Results for orders placed or performed in visit on 07/27/23   HEMOGLOBIN A1C   Result Value Ref Range    Hemoglobin A1C 6.7 (H) 4.0 - 5.6 %    Estimated Avg Glucose 146 (H) 68 - 131 mg/dL      Assessment:       1. Dysuria        Plan:       Dysuria  -     Urinalysis, Reflex to Urine Culture Urine, Clean Catch; Future; Expected date: 08/17/2023  -     CULTURE, URINE; Future; Expected date: 08/17/2023  -     sulfamethoxazole-trimethoprim 800-160mg (BACTRIM DS) 800-160 mg Tab; Take 1 tablet by mouth 2 (two) times daily. for 7 days  Dispense: 14 tablet; Refill: 0    Recheck Urine at this time.  Start Bactrim.      Medication List with Changes/Refills   New Medications    SULFAMETHOXAZOLE-TRIMETHOPRIM 800-160MG (BACTRIM DS) 800-160 MG TAB    Take 1 tablet by mouth 2 (two) times daily. for 7 days   Current Medications     "ALPRAZOLAM (XANAX) 1 MG TABLET    TAKE 1 TABLET (1 MG TOTAL) BY MOUTH 2 (TWO) TIMES DAILY.    BUPROPION (WELLBUTRIN XL) 150 MG TB24 TABLET    Take 1 tablet (150 mg total) by mouth once daily.    CARVEDILOL (COREG) 3.125 MG TABLET    Take 3.125 mg by mouth 2 (two) times daily with meals.    DESLORATADINE (CLARINEX) 5 MG TABLET    Take 1 tablet (5 mg total) by mouth once daily.    DULOXETINE (CYMBALTA) 60 MG CAPSULE    TAKE ONE CAPSULE BY MOUTH TWICE DAILY    ERGOCALCIFEROL (ERGOCALCIFEROL) 50,000 UNIT CAP    Take 50,000 Units by mouth every 7 days.    FUROSEMIDE (LASIX) 40 MG TABLET    Take 40 mg by mouth 2 (two) times daily.    IBUPROFEN (ADVIL,MOTRIN) 800 MG TABLET    Take 800 mg by mouth 3 (three) times daily as needed. As needed    INSULIN (LANTUS SOLOSTAR U-100 INSULIN) GLARGINE 100 UNITS/ML SUBQ PEN    Inject 42 Units into the skin every evening.    INSULIN ASPART U-100 (NOVOLOG FLEXPEN U-100 INSULIN) 100 UNIT/ML (3 ML) INPN PEN    8 u Ac + correction  Max TDD 54u    LOSARTAN (COZAAR) 50 MG TABLET    Take 50 mg by mouth once daily.    METFORMIN (GLUCOPHAGE) 1000 MG TABLET    Take 1 tablet by mouth 2 (two) times daily.    MONTELUKAST (SINGULAIR) 10 MG TABLET    TAKE 1 TABLET (10 MG TOTAL) BY MOUTH EVERY EVENING.    OXYBUTYNIN (DITROPAN-XL) 10 MG 24 HR TABLET    Take 1 tablet (10 mg total) by mouth once daily.    OXYCODONE-ACETAMINOPHEN (PERCOCET) 5-325 MG PER TABLET    Take 1 tablet by mouth every 4 to 6 hours as needed for Pain.    PANTOPRAZOLE (PROTONIX) 40 MG TABLET    Take 1 tablet (40 mg total) by mouth once daily.    PEN NEEDLE, DIABETIC (BD KARYN 2ND GEN PEN NEEDLE) 32 GAUGE X 5/32" NDLE    Checks bg 4 times a day    PREGABALIN (LYRICA) 150 MG CAPSULE    Take 1 capsule (150 mg total) by mouth 3 (three) times daily.    PROMETHAZINE (PHENERGAN) 25 MG TABLET    Take 25 mg by mouth before meals as needed.    ROPINIROLE (REQUIP) 2 MG TABLET    Take 2 mg by mouth 2 (two) times daily.    ROSUVASTATIN (CRESTOR) 20 " MG TABLET    Take 20 mg by mouth every evening.    SEMAGLUTIDE (OZEMPIC) 2 MG/DOSE (8 MG/3 ML) PNIJ    Inject 2 mg into the skin every 7 days.    SPIRONOLACTONE (ALDACTONE) 50 MG TABLET    Take 1 tablet (50 mg total) by mouth once daily.    SYMBICORT 80-4.5 MCG/ACTUATION HFAA    Inhale 2 puffs into the lungs 2 (two) times a day.    TEMAZEPAM (RESTORIL) 30 MG CAPSULE    Take 1 capsule (30 mg total) by mouth nightly as needed for Insomnia.    TENS UNITS YASEMIN    1 Units by Misc.(Non-Drug; Combo Route) route daily as needed.    TIZANIDINE (ZANAFLEX) 4 MG TABLET    Take 4 mg by mouth 3 (three) times daily. Take three times daily    TRUE METRIX GLUCOSE TEST STRIP STRP    USE TO TEST BLOOD SUGAR TWICE DAILY

## 2023-08-18 ENCOUNTER — LAB VISIT (OUTPATIENT)
Dept: PRIMARY CARE CLINIC | Facility: CLINIC | Age: 63
End: 2023-08-18
Payer: MEDICARE

## 2023-08-18 DIAGNOSIS — R30.0 DYSURIA: ICD-10-CM

## 2023-08-18 LAB
BACTERIA #/AREA URNS AUTO: ABNORMAL /HPF
BILIRUB UR QL STRIP: NEGATIVE
CLARITY UR REFRACT.AUTO: ABNORMAL
COLOR UR AUTO: YELLOW
GLUCOSE UR QL STRIP: NEGATIVE
HGB UR QL STRIP: ABNORMAL
KETONES UR QL STRIP: NEGATIVE
LEUKOCYTE ESTERASE UR QL STRIP: ABNORMAL
MICROSCOPIC COMMENT: ABNORMAL
NITRITE UR QL STRIP: NEGATIVE
PH UR STRIP: 5 [PH] (ref 5–8)
PROT UR QL STRIP: NEGATIVE
RBC #/AREA URNS AUTO: 3 /HPF (ref 0–4)
SP GR UR STRIP: 1.01 (ref 1–1.03)
SQUAMOUS #/AREA URNS AUTO: 1 /HPF
URN SPEC COLLECT METH UR: ABNORMAL
WBC #/AREA URNS AUTO: 33 /HPF (ref 0–5)
WBC CLUMPS UR QL AUTO: ABNORMAL

## 2023-08-18 PROCEDURE — 87088 URINE BACTERIA CULTURE: CPT | Performed by: FAMILY MEDICINE

## 2023-08-18 PROCEDURE — 87077 CULTURE AEROBIC IDENTIFY: CPT | Performed by: FAMILY MEDICINE

## 2023-08-18 PROCEDURE — 81001 URINALYSIS AUTO W/SCOPE: CPT | Performed by: FAMILY MEDICINE

## 2023-08-18 PROCEDURE — 87086 URINE CULTURE/COLONY COUNT: CPT | Performed by: FAMILY MEDICINE

## 2023-08-18 PROCEDURE — 87186 SC STD MICRODIL/AGAR DIL: CPT | Performed by: FAMILY MEDICINE

## 2023-08-18 RX ORDER — BUDESONIDE AND FORMOTEROL FUMARATE DIHYDRATE 80; 4.5 UG/1; UG/1
2 AEROSOL RESPIRATORY (INHALATION) 2 TIMES DAILY
Qty: 10.2 G | Refills: 0 | Status: SHIPPED | OUTPATIENT
Start: 2023-08-18 | End: 2023-09-18

## 2023-08-18 NOTE — TELEPHONE ENCOUNTER
In absence of PCP  CARMEN Kennedy (collaborating provider) patient chart reviewed including medication profile, allergies, last note by PCP. To avoid any lapse in medication and promote continuity of care and control of blood pressure/diabetes/chronic medical condition I have sent refill of non controlled medication or DME.    (Symbicort 80-45 #1)

## 2023-08-19 ENCOUNTER — HOSPITAL ENCOUNTER (OUTPATIENT)
Dept: RADIOLOGY | Facility: HOSPITAL | Age: 63
Discharge: HOME | End: 2023-08-19
Payer: MEDICARE

## 2023-08-19 DIAGNOSIS — R05.3 CHRONIC COUGH: ICD-10-CM

## 2023-08-19 PROCEDURE — 71250 CT THORAX DX C-: CPT | Mod: TC,PO

## 2023-08-19 PROCEDURE — 71250 CT CHEST WITHOUT CONTRAST: ICD-10-PCS | Mod: 26,,, | Performed by: RADIOLOGY

## 2023-08-19 PROCEDURE — 71250 CT THORAX DX C-: CPT | Mod: 26,,, | Performed by: RADIOLOGY

## 2023-08-21 ENCOUNTER — PATIENT OUTREACH (OUTPATIENT)
Dept: ADMINISTRATIVE | Facility: HOSPITAL | Age: 63
End: 2023-08-21
Payer: MEDICARE

## 2023-08-21 LAB — BACTERIA UR CULT: ABNORMAL

## 2023-08-28 ENCOUNTER — TELEPHONE (OUTPATIENT)
Dept: ENDOCRINOLOGY | Facility: CLINIC | Age: 63
End: 2023-08-28
Payer: MEDICARE

## 2023-08-28 NOTE — TELEPHONE ENCOUNTER
----- Message from Zayda Cardoza sent at 8/28/2023  9:23 AM CDT -----  Regarding: callback  Type:  Needs Medical Advice    Who Called: Pt    Would the patient rather a call back or a response via [facility]? Callback    Best Call Back Number: [phone]    Additional Information: Pt would like a call back about appt. Thank you

## 2023-08-28 NOTE — TELEPHONE ENCOUNTER
Spoke to pt and she stated she had to cxl her appt with Bhavana today and needed to r/s. R/s first avail virtual visit and added to wtl.

## 2023-08-30 DIAGNOSIS — R05.9 COUGH, UNSPECIFIED TYPE: ICD-10-CM

## 2023-08-30 RX ORDER — MONTELUKAST SODIUM 10 MG/1
10 TABLET ORAL NIGHTLY
Qty: 30 TABLET | Refills: 0 | Status: SHIPPED | OUTPATIENT
Start: 2023-08-30 | End: 2023-09-29

## 2023-09-07 DIAGNOSIS — Z79.4 TYPE 2 DIABETES MELLITUS WITH HYPERGLYCEMIA, WITH LONG-TERM CURRENT USE OF INSULIN: ICD-10-CM

## 2023-09-07 DIAGNOSIS — E11.65 TYPE 2 DIABETES MELLITUS WITH HYPERGLYCEMIA, WITH LONG-TERM CURRENT USE OF INSULIN: ICD-10-CM

## 2023-09-07 RX ORDER — INSULIN ASPART 100 [IU]/ML
INJECTION, SOLUTION INTRAVENOUS; SUBCUTANEOUS
Qty: 6 EACH | Refills: 12 | Status: SHIPPED | OUTPATIENT
Start: 2023-09-07 | End: 2023-09-08 | Stop reason: SDUPTHER

## 2023-09-08 DIAGNOSIS — E11.65 TYPE 2 DIABETES MELLITUS WITH HYPERGLYCEMIA, WITH LONG-TERM CURRENT USE OF INSULIN: ICD-10-CM

## 2023-09-08 DIAGNOSIS — Z79.4 TYPE 2 DIABETES MELLITUS WITH HYPERGLYCEMIA, WITH LONG-TERM CURRENT USE OF INSULIN: ICD-10-CM

## 2023-09-08 RX ORDER — INSULIN ASPART 100 [IU]/ML
INJECTION, SOLUTION INTRAVENOUS; SUBCUTANEOUS
Qty: 6 EACH | Refills: 12 | Status: SHIPPED | OUTPATIENT
Start: 2023-09-08 | End: 2023-10-17

## 2023-09-08 NOTE — TELEPHONE ENCOUNTER
----- Message from Radha Esparza sent at 9/8/2023 12:05 PM CDT -----  Type: Needs Medical Advice  Who Called:  alex     Best Call Back Number: [phone] ref# [id number]  Additional Information: alex has additional clinical questions regarding pt medicine please advise

## 2023-09-18 DIAGNOSIS — R05.9 COUGH, UNSPECIFIED TYPE: ICD-10-CM

## 2023-09-18 RX ORDER — BUDESONIDE AND FORMOTEROL FUMARATE DIHYDRATE 80; 4.5 UG/1; UG/1
2 AEROSOL RESPIRATORY (INHALATION) 2 TIMES DAILY
Qty: 10.2 G | Refills: 0 | Status: SHIPPED | OUTPATIENT
Start: 2023-09-18 | End: 2023-10-02 | Stop reason: SDUPTHER

## 2023-09-25 DIAGNOSIS — F32.A DEPRESSION, UNSPECIFIED DEPRESSION TYPE: ICD-10-CM

## 2023-09-25 RX ORDER — BUPROPION HYDROCHLORIDE 150 MG/1
150 TABLET ORAL DAILY
Qty: 90 TABLET | Refills: 0 | Status: SHIPPED | OUTPATIENT
Start: 2023-09-25 | End: 2023-10-02 | Stop reason: SDUPTHER

## 2023-09-28 DIAGNOSIS — R05.9 COUGH, UNSPECIFIED TYPE: ICD-10-CM

## 2023-09-28 NOTE — TELEPHONE ENCOUNTER
Refill Routing Note   Medication(s) are not appropriate for processing by [facility] for the following reason(s):      Non-participating provider    [facility] action(s):  Route Care Due:  None identified            Appointments  past 12m or future 3m with PCP    Date Provider   Last Visit   6/27/2023 Fer Herring III, PA-C   Next Visit   10/2/2023 Fer Herring III, PA-C   ED visits in past 90 days: 0        Note composed:11:11 AM 09/28/2023

## 2023-09-29 RX ORDER — MONTELUKAST SODIUM 10 MG/1
10 TABLET ORAL NIGHTLY
Qty: 90 TABLET | Refills: 3 | Status: SHIPPED | OUTPATIENT
Start: 2023-09-29 | End: 2023-10-02 | Stop reason: SDUPTHER

## 2023-10-02 ENCOUNTER — OFFICE VISIT (OUTPATIENT)
Dept: PRIMARY CARE CLINIC | Facility: CLINIC | Age: 63
End: 2023-10-02
Payer: MEDICARE

## 2023-10-02 VITALS
HEART RATE: 80 BPM | WEIGHT: 219.38 LBS | DIASTOLIC BLOOD PRESSURE: 78 MMHG | BODY MASS INDEX: 41.45 KG/M2 | SYSTOLIC BLOOD PRESSURE: 122 MMHG

## 2023-10-02 DIAGNOSIS — Z79.4 TYPE 2 DIABETES MELLITUS WITH DIABETIC POLYNEUROPATHY, WITH LONG-TERM CURRENT USE OF INSULIN: ICD-10-CM

## 2023-10-02 DIAGNOSIS — G25.81 RESTLESS LEG SYNDROME: ICD-10-CM

## 2023-10-02 DIAGNOSIS — R11.0 NAUSEA: ICD-10-CM

## 2023-10-02 DIAGNOSIS — E11.69 DM TYPE 2 WITH DIABETIC MIXED HYPERLIPIDEMIA: ICD-10-CM

## 2023-10-02 DIAGNOSIS — J30.2 SEASONAL ALLERGIES: ICD-10-CM

## 2023-10-02 DIAGNOSIS — I51.89 DIASTOLIC DYSFUNCTION: ICD-10-CM

## 2023-10-02 DIAGNOSIS — E78.2 DM TYPE 2 WITH DIABETIC MIXED HYPERLIPIDEMIA: ICD-10-CM

## 2023-10-02 DIAGNOSIS — G47.00 INSOMNIA, UNSPECIFIED TYPE: ICD-10-CM

## 2023-10-02 DIAGNOSIS — Z79.4 TYPE 2 DIABETES MELLITUS WITH MICROALBUMINURIA, WITH LONG-TERM CURRENT USE OF INSULIN: Primary | ICD-10-CM

## 2023-10-02 DIAGNOSIS — E11.42 TYPE 2 DIABETES MELLITUS WITH DIABETIC POLYNEUROPATHY, WITH LONG-TERM CURRENT USE OF INSULIN: ICD-10-CM

## 2023-10-02 DIAGNOSIS — Z12.11 SCREENING FOR MALIGNANT NEOPLASM OF COLON: ICD-10-CM

## 2023-10-02 DIAGNOSIS — F32.A DEPRESSION, UNSPECIFIED DEPRESSION TYPE: ICD-10-CM

## 2023-10-02 DIAGNOSIS — K21.9 GASTROESOPHAGEAL REFLUX DISEASE WITHOUT ESOPHAGITIS: ICD-10-CM

## 2023-10-02 DIAGNOSIS — E55.9 VITAMIN D DEFICIENCY: ICD-10-CM

## 2023-10-02 DIAGNOSIS — E11.29 TYPE 2 DIABETES MELLITUS WITH MICROALBUMINURIA, WITH LONG-TERM CURRENT USE OF INSULIN: Primary | ICD-10-CM

## 2023-10-02 DIAGNOSIS — R80.9 TYPE 2 DIABETES MELLITUS WITH MICROALBUMINURIA, WITH LONG-TERM CURRENT USE OF INSULIN: Primary | ICD-10-CM

## 2023-10-02 DIAGNOSIS — R32 URINARY INCONTINENCE, UNSPECIFIED TYPE: ICD-10-CM

## 2023-10-02 DIAGNOSIS — R05.9 COUGH, UNSPECIFIED TYPE: ICD-10-CM

## 2023-10-02 DIAGNOSIS — R05.3 CHRONIC COUGH: ICD-10-CM

## 2023-10-02 DIAGNOSIS — F41.0 PANIC DISORDER: ICD-10-CM

## 2023-10-02 LAB
ALBUMIN SERPL BCP-MCNC: 3.6 G/DL (ref 3.5–5.2)
ALP SERPL-CCNC: 112 U/L (ref 55–135)
ALT SERPL W/O P-5'-P-CCNC: 12 U/L (ref 10–44)
ANION GAP SERPL CALC-SCNC: 13 MMOL/L (ref 8–16)
AST SERPL-CCNC: 17 U/L (ref 10–40)
BILIRUB SERPL-MCNC: 0.6 MG/DL (ref 0.1–1)
BUN SERPL-MCNC: 15 MG/DL (ref 8–23)
CALCIUM SERPL-MCNC: 8.6 MG/DL (ref 8.7–10.5)
CHLORIDE SERPL-SCNC: 99 MMOL/L (ref 95–110)
CHOLEST SERPL-MCNC: 140 MG/DL (ref 120–199)
CHOLEST/HDLC SERPL: 3.9 {RATIO} (ref 2–5)
CO2 SERPL-SCNC: 30 MMOL/L (ref 23–29)
CREAT SERPL-MCNC: 0.9 MG/DL (ref 0.5–1.4)
EST. GFR  (NO RACE VARIABLE): >60 ML/MIN/1.73 M^2
ESTIMATED AVG GLUCOSE: 160 MG/DL (ref 68–131)
GLUCOSE SERPL-MCNC: 145 MG/DL (ref 70–110)
HBA1C MFR BLD: 7.2 % (ref 4–5.6)
HDLC SERPL-MCNC: 36 MG/DL (ref 40–75)
HDLC SERPL: 25.7 % (ref 20–50)
LDLC SERPL CALC-MCNC: 69.8 MG/DL (ref 63–159)
MAGNESIUM SERPL-MCNC: 1.6 MG/DL (ref 1.6–2.6)
NONHDLC SERPL-MCNC: 104 MG/DL
POTASSIUM SERPL-SCNC: 3.2 MMOL/L (ref 3.5–5.1)
PROT SERPL-MCNC: 7.1 G/DL (ref 6–8.4)
SODIUM SERPL-SCNC: 142 MMOL/L (ref 136–145)
TRIGL SERPL-MCNC: 171 MG/DL (ref 30–150)

## 2023-10-02 PROCEDURE — 3078F PR MOST RECENT DIASTOLIC BLOOD PRESSURE < 80 MM HG: ICD-10-PCS | Mod: CPTII,S$GLB,, | Performed by: PHYSICIAN ASSISTANT

## 2023-10-02 PROCEDURE — 1160F RVW MEDS BY RX/DR IN RCRD: CPT | Mod: CPTII,S$GLB,, | Performed by: PHYSICIAN ASSISTANT

## 2023-10-02 PROCEDURE — 80053 COMPREHEN METABOLIC PANEL: CPT | Performed by: PHYSICIAN ASSISTANT

## 2023-10-02 PROCEDURE — 3066F PR DOCUMENTATION OF TREATMENT FOR NEPHROPATHY: ICD-10-PCS | Mod: CPTII,S$GLB,, | Performed by: PHYSICIAN ASSISTANT

## 2023-10-02 PROCEDURE — 3066F NEPHROPATHY DOC TX: CPT | Mod: CPTII,S$GLB,, | Performed by: PHYSICIAN ASSISTANT

## 2023-10-02 PROCEDURE — 1160F PR REVIEW ALL MEDS BY PRESCRIBER/CLIN PHARMACIST DOCUMENTED: ICD-10-PCS | Mod: CPTII,S$GLB,, | Performed by: PHYSICIAN ASSISTANT

## 2023-10-02 PROCEDURE — 3060F POS MICROALBUMINURIA REV: CPT | Mod: CPTII,S$GLB,, | Performed by: PHYSICIAN ASSISTANT

## 2023-10-02 PROCEDURE — 3074F SYST BP LT 130 MM HG: CPT | Mod: CPTII,S$GLB,, | Performed by: PHYSICIAN ASSISTANT

## 2023-10-02 PROCEDURE — 3008F BODY MASS INDEX DOCD: CPT | Mod: CPTII,S$GLB,, | Performed by: PHYSICIAN ASSISTANT

## 2023-10-02 PROCEDURE — 83036 HEMOGLOBIN GLYCOSYLATED A1C: CPT | Performed by: PHYSICIAN ASSISTANT

## 2023-10-02 PROCEDURE — 3008F PR BODY MASS INDEX (BMI) DOCUMENTED: ICD-10-PCS | Mod: CPTII,S$GLB,, | Performed by: PHYSICIAN ASSISTANT

## 2023-10-02 PROCEDURE — 36415 PR COLLECTION VENOUS BLOOD,VENIPUNCTURE: ICD-10-PCS | Mod: S$GLB,,, | Performed by: INTERNAL MEDICINE

## 2023-10-02 PROCEDURE — 36415 COLL VENOUS BLD VENIPUNCTURE: CPT | Mod: S$GLB,,, | Performed by: INTERNAL MEDICINE

## 2023-10-02 PROCEDURE — 99214 PR OFFICE/OUTPT VISIT, EST, LEVL IV, 30-39 MIN: ICD-10-PCS | Mod: S$GLB,,, | Performed by: PHYSICIAN ASSISTANT

## 2023-10-02 PROCEDURE — 99214 OFFICE O/P EST MOD 30 MIN: CPT | Mod: S$GLB,,, | Performed by: PHYSICIAN ASSISTANT

## 2023-10-02 PROCEDURE — 4010F PR ACE/ARB THEARPY RXD/TAKEN: ICD-10-PCS | Mod: CPTII,S$GLB,, | Performed by: PHYSICIAN ASSISTANT

## 2023-10-02 PROCEDURE — 3044F PR MOST RECENT HEMOGLOBIN A1C LEVEL <7.0%: ICD-10-PCS | Mod: CPTII,S$GLB,, | Performed by: PHYSICIAN ASSISTANT

## 2023-10-02 PROCEDURE — 3078F DIAST BP <80 MM HG: CPT | Mod: CPTII,S$GLB,, | Performed by: PHYSICIAN ASSISTANT

## 2023-10-02 PROCEDURE — 1159F MED LIST DOCD IN RCRD: CPT | Mod: CPTII,S$GLB,, | Performed by: PHYSICIAN ASSISTANT

## 2023-10-02 PROCEDURE — 3074F PR MOST RECENT SYSTOLIC BLOOD PRESSURE < 130 MM HG: ICD-10-PCS | Mod: CPTII,S$GLB,, | Performed by: PHYSICIAN ASSISTANT

## 2023-10-02 PROCEDURE — 80061 LIPID PANEL: CPT | Performed by: PHYSICIAN ASSISTANT

## 2023-10-02 PROCEDURE — 3060F PR POS MICROALBUMINURIA RESULT DOCUMENTED/REVIEW: ICD-10-PCS | Mod: CPTII,S$GLB,, | Performed by: PHYSICIAN ASSISTANT

## 2023-10-02 PROCEDURE — 3044F HG A1C LEVEL LT 7.0%: CPT | Mod: CPTII,S$GLB,, | Performed by: PHYSICIAN ASSISTANT

## 2023-10-02 PROCEDURE — 4010F ACE/ARB THERAPY RXD/TAKEN: CPT | Mod: CPTII,S$GLB,, | Performed by: PHYSICIAN ASSISTANT

## 2023-10-02 PROCEDURE — 1159F PR MEDICATION LIST DOCUMENTED IN MEDICAL RECORD: ICD-10-PCS | Mod: CPTII,S$GLB,, | Performed by: PHYSICIAN ASSISTANT

## 2023-10-02 PROCEDURE — 83735 ASSAY OF MAGNESIUM: CPT | Performed by: PHYSICIAN ASSISTANT

## 2023-10-02 RX ORDER — ERGOCALCIFEROL 1.25 MG/1
50000 CAPSULE ORAL
Qty: 12 CAPSULE | Refills: 3 | Status: SHIPPED | OUTPATIENT
Start: 2023-10-02 | End: 2024-04-02

## 2023-10-02 RX ORDER — OXYBUTYNIN CHLORIDE 10 MG/1
10 TABLET, EXTENDED RELEASE ORAL DAILY
Qty: 90 TABLET | Refills: 3 | Status: SHIPPED | OUTPATIENT
Start: 2023-10-02 | End: 2023-12-28 | Stop reason: DRUGHIGH

## 2023-10-02 RX ORDER — BUPROPION HYDROCHLORIDE 150 MG/1
150 TABLET ORAL DAILY
Qty: 90 TABLET | Refills: 0 | Status: SHIPPED | OUTPATIENT
Start: 2023-10-02 | End: 2023-12-25 | Stop reason: SDUPTHER

## 2023-10-02 RX ORDER — SPIRONOLACTONE 50 MG/1
50 TABLET, FILM COATED ORAL DAILY
Qty: 360 TABLET | Refills: 0 | Status: SHIPPED | OUTPATIENT
Start: 2023-10-02 | End: 2023-12-25 | Stop reason: SDUPTHER

## 2023-10-02 RX ORDER — CARVEDILOL 3.12 MG/1
3.12 TABLET ORAL 2 TIMES DAILY WITH MEALS
Qty: 180 TABLET | Refills: 3 | Status: SHIPPED | OUTPATIENT
Start: 2023-10-02 | End: 2024-04-02 | Stop reason: SDUPTHER

## 2023-10-02 RX ORDER — DESLORATADINE 5 MG/1
5 TABLET ORAL DAILY
Qty: 90 TABLET | Refills: 3 | Status: SHIPPED | OUTPATIENT
Start: 2023-10-02 | End: 2023-12-18

## 2023-10-02 RX ORDER — ROSUVASTATIN CALCIUM 20 MG/1
20 TABLET, COATED ORAL NIGHTLY
Qty: 90 TABLET | Refills: 3 | Status: SHIPPED | OUTPATIENT
Start: 2023-10-02 | End: 2024-04-02 | Stop reason: SDUPTHER

## 2023-10-02 RX ORDER — ALPRAZOLAM 1 MG/1
1 TABLET ORAL 2 TIMES DAILY
Qty: 60 TABLET | Refills: 2 | Status: SHIPPED | OUTPATIENT
Start: 2023-10-02 | End: 2023-12-28

## 2023-10-02 RX ORDER — PROMETHAZINE HYDROCHLORIDE 25 MG/1
25 TABLET ORAL
Qty: 30 TABLET | Refills: 1 | Status: SHIPPED | OUTPATIENT
Start: 2023-10-02 | End: 2023-12-01

## 2023-10-02 RX ORDER — TEMAZEPAM 30 MG/1
30 CAPSULE ORAL NIGHTLY PRN
Qty: 30 CAPSULE | Refills: 2 | Status: SHIPPED | OUTPATIENT
Start: 2023-10-02 | End: 2023-11-16

## 2023-10-02 RX ORDER — TIZANIDINE 4 MG/1
4 TABLET ORAL 3 TIMES DAILY
Qty: 180 TABLET | Refills: 3 | Status: SHIPPED | OUTPATIENT
Start: 2023-10-02 | End: 2024-02-01

## 2023-10-02 RX ORDER — MONTELUKAST SODIUM 10 MG/1
10 TABLET ORAL NIGHTLY
Qty: 90 TABLET | Refills: 3 | Status: SHIPPED | OUTPATIENT
Start: 2023-10-02 | End: 2024-04-02 | Stop reason: SDUPTHER

## 2023-10-02 RX ORDER — LOSARTAN POTASSIUM 50 MG/1
50 TABLET ORAL DAILY
Qty: 90 TABLET | Refills: 3 | Status: SHIPPED | OUTPATIENT
Start: 2023-10-02 | End: 2024-04-02 | Stop reason: SDUPTHER

## 2023-10-02 RX ORDER — BUDESONIDE AND FORMOTEROL FUMARATE DIHYDRATE 80; 4.5 UG/1; UG/1
2 AEROSOL RESPIRATORY (INHALATION) 2 TIMES DAILY
Qty: 10.2 G | Refills: 0 | Status: SHIPPED | OUTPATIENT
Start: 2023-10-02 | End: 2024-03-19 | Stop reason: SDUPTHER

## 2023-10-02 RX ORDER — MUPIROCIN 20 MG/G
OINTMENT TOPICAL 3 TIMES DAILY
Qty: 30 G | Refills: 6 | Status: SHIPPED | OUTPATIENT
Start: 2023-10-02

## 2023-10-02 RX ORDER — ROPINIROLE 2 MG/1
2 TABLET, FILM COATED ORAL 2 TIMES DAILY
Qty: 180 TABLET | Refills: 3 | Status: SHIPPED | OUTPATIENT
Start: 2023-10-02 | End: 2024-04-02

## 2023-10-02 RX ORDER — DULOXETIN HYDROCHLORIDE 60 MG/1
60 CAPSULE, DELAYED RELEASE ORAL 2 TIMES DAILY
Qty: 180 CAPSULE | Refills: 3 | Status: SHIPPED | OUTPATIENT
Start: 2023-10-02 | End: 2024-04-02 | Stop reason: SDUPTHER

## 2023-10-02 RX ORDER — PREGABALIN 150 MG/1
150 CAPSULE ORAL 3 TIMES DAILY
Qty: 270 CAPSULE | Refills: 1 | Status: SHIPPED | OUTPATIENT
Start: 2023-10-02 | End: 2024-04-02 | Stop reason: SDUPTHER

## 2023-10-02 RX ORDER — PANTOPRAZOLE SODIUM 40 MG/1
40 TABLET, DELAYED RELEASE ORAL DAILY
Qty: 90 TABLET | Refills: 3 | Status: SHIPPED | OUTPATIENT
Start: 2023-10-02 | End: 2024-04-02 | Stop reason: SDUPTHER

## 2023-10-02 RX ORDER — METFORMIN HYDROCHLORIDE 1000 MG/1
1000 TABLET ORAL 2 TIMES DAILY
Qty: 180 TABLET | Refills: 1 | Status: SHIPPED | OUTPATIENT
Start: 2023-10-02 | End: 2023-12-29

## 2023-10-02 RX ORDER — FUROSEMIDE 40 MG/1
40 TABLET ORAL 2 TIMES DAILY
Qty: 180 TABLET | Refills: 1 | Status: SHIPPED | OUTPATIENT
Start: 2023-10-02 | End: 2024-04-02 | Stop reason: SDUPTHER

## 2023-10-02 NOTE — PROGRESS NOTES
Subjective     Patient ID: Amanda Barton is a 63 y.o. female.    Chief Complaint: Follow-up (3 month f/u)    Patient is a 62 yo female who comes in today for a check up. She has multiple complex on going medical problems.     Patient Active Problem List:     History of non-ST elevation myocardial infarction (NSTEMI)     BJ (obstructive sleep apnea)     Bacteremia due to methicillin resistant Staphylococcus aureus     Chronic diastolic heart failure     Diastolic dysfunction     Paroxysmal atrial fibrillation     Pericarditis     Cough     Sepsis     Febrile illness     Iron deficiency anemia     Troponin level elevated     Obesity     Chronic anticoagulation     Symptomatic anemia     Constipation     GI bleed     Type 2 diabetes mellitus with hyperglycemia, with long-term current use of insulin     Type 2 diabetes mellitus with diabetic polyneuropathy, with long-term current use of insulin     Type 2 diabetes mellitus with microalbuminuria, with long-term current use of insulin     Chronic obstructive pulmonary disease, unspecified COPD type     Insomnia     Osteoarthritis of lumbar spine     Depression     DM type 2 with diabetic mixed hyperlipidemia     Gastroesophageal reflux disease without esophagitis     Urinary incontinence     Seasonal allergies     Panic disorder     Vitamin D deficiency     Nausea     Restless leg syndrome   Reviewed her current problem list.     Past Medical History:  12/02/2017: Acute bacterial pericarditis  No date: Anemia  11/20/2017: Bacteremia due to methicillin resistant Staphylococcus   aureus  No date: Diabetes mellitus  No date: Encounter for blood transfusion  No date: GERD (gastroesophageal reflux disease)  No date: Heart murmur  No date: History of pneumonia  No date: History of UTI  No date: Hypertension  No date: Migraine headache  No date: Sleep apnea      Comment:  no machine yet  11/19/2017: Type 2 diabetes mellitus without complication, with long-  term current use  of insulin        Review of Systems   Constitutional:  Negative for activity change, chills, fatigue and fever.   HENT:  Positive for postnasal drip.    Eyes: Negative.    Respiratory:  Negative for chest tightness, shortness of breath and wheezing.    Cardiovascular:  Negative for chest pain, palpitations and leg swelling.   Gastrointestinal:  Positive for nausea. Negative for abdominal distention, abdominal pain and reflux.   Endocrine: Negative.    Genitourinary:  Positive for bladder incontinence.   Integumentary:  Negative.   Neurological:  Negative for dizziness, tremors, seizures, headaches and memory loss.   Hematological: Negative.           Objective     Physical Exam  Vitals reviewed.   Constitutional:       General: She is not in acute distress.     Appearance: Normal appearance. She is not ill-appearing, toxic-appearing or diaphoretic.   Eyes:      Conjunctiva/sclera: Conjunctivae normal.   Neck:      Vascular: No carotid bruit.   Cardiovascular:      Rate and Rhythm: Normal rate and regular rhythm.      Pulses: Normal pulses.      Heart sounds: Normal heart sounds. No murmur heard.     No friction rub. No gallop.   Pulmonary:      Effort: Pulmonary effort is normal. No respiratory distress.      Breath sounds: Normal breath sounds. No stridor. No wheezing, rhonchi or rales.   Chest:      Chest wall: No tenderness.   Abdominal:      Palpations: Abdomen is soft.      Tenderness: There is no abdominal tenderness.   Musculoskeletal:         General: No swelling or tenderness.      Cervical back: No rigidity or tenderness.   Lymphadenopathy:      Cervical: No cervical adenopathy.   Skin:     General: Skin is warm and dry.      Coloration: Skin is not jaundiced.   Neurological:      Mental Status: She is alert.   Psychiatric:         Mood and Affect: Mood normal.       Lab Results   Component Value Date    WBC 7.48 07/11/2023    HGB 11.7 (L) 07/11/2023    HCT 36.8 (L) 07/11/2023    MCV 87 07/11/2023    PLT  218 07/11/2023     CMP  Sodium   Date Value Ref Range Status   07/11/2023 140 136 - 145 mmol/L Final     Potassium   Date Value Ref Range Status   07/11/2023 3.6 3.5 - 5.1 mmol/L Final     Chloride   Date Value Ref Range Status   07/11/2023 105 95 - 110 mmol/L Final     CO2   Date Value Ref Range Status   07/11/2023 22 (L) 23 - 29 mmol/L Final     Glucose   Date Value Ref Range Status   07/11/2023 151 (H) 70 - 110 mg/dL Final     BUN   Date Value Ref Range Status   07/11/2023 10 8 - 23 mg/dL Final     Creatinine   Date Value Ref Range Status   07/11/2023 0.8 0.5 - 1.4 mg/dL Final     Calcium   Date Value Ref Range Status   07/11/2023 8.8 8.7 - 10.5 mg/dL Final     Total Protein   Date Value Ref Range Status   07/11/2023 7.0 6.0 - 8.4 g/dL Final     Albumin   Date Value Ref Range Status   07/11/2023 3.3 (L) 3.5 - 5.2 g/dL Final     Total Bilirubin   Date Value Ref Range Status   07/11/2023 0.4 0.1 - 1.0 mg/dL Final     Comment:     For infants and newborns, interpretation of results should be based  on gestational age, weight and in agreement with clinical  observations.    Premature Infant recommended reference ranges:  Up to 24 hours.............<8.0 mg/dL  Up to 48 hours............<12.0 mg/dL  3-5 days..................<15.0 mg/dL  6-29 days.................<15.0 mg/dL       Alkaline Phosphatase   Date Value Ref Range Status   07/11/2023 108 55 - 135 U/L Final     AST   Date Value Ref Range Status   07/11/2023 17 10 - 40 U/L Final     ALT   Date Value Ref Range Status   07/11/2023 13 10 - 44 U/L Final     Anion Gap   Date Value Ref Range Status   07/11/2023 13 8 - 16 mmol/L Final     eGFR if    Date Value Ref Range Status   11/21/2019 >60 >60 mL/min/1.73 m^2 Final     eGFR if non    Date Value Ref Range Status   11/21/2019 >60 >60 mL/min/1.73 m^2 Final     Comment:     Calculation used to obtain the estimated glomerular filtration  rate (eGFR) is the CKD-EPI equation.        Lab  Results   Component Value Date    CHOL 171 05/15/2023     Lab Results   Component Value Date    HDL 34 (L) 05/15/2023     Lab Results   Component Value Date    LDLCALC 87.0 05/15/2023     Lab Results   Component Value Date    TRIG 250 (H) 05/15/2023     Lab Results   Component Value Date    CHOLHDL 19.9 (L) 05/15/2023     Lab Results   Component Value Date    HGBA1C 6.7 (H) 07/27/2023         Assessment and Plan     1. Type 2 diabetes mellitus with microalbuminuria, with long-term current use of insulin  -     Hemoglobin A1C; Future; Expected date: 10/02/2023  -     semaglutide, weight loss, 2.4 mg/0.75 mL PnIj; Inject 2.4 mg into the skin every 7 days.  Dispense: 4 each; Refill: 11  -     Magnesium; Future; Expected date: 10/02/2023  -     Comprehensive Metabolic Panel; Future; Expected date: 10/02/2023  -     Lipid Panel; Future; Expected date: 10/02/2023    2. Insomnia, unspecified type  -     temazepam (RESTORIL) 30 mg capsule; Take 1 capsule (30 mg total) by mouth nightly as needed for Insomnia.  Dispense: 30 capsule; Refill: 2    3. Cough, unspecified type  -     SYMBICORT 80-4.5 mcg/actuation HFAA; Inhale 2 puffs into the lungs 2 (two) times a day.  Dispense: 10.2 g; Refill: 0  -     montelukast (SINGULAIR) 10 mg tablet; Take 1 tablet (10 mg total) by mouth every evening.  Dispense: 90 tablet; Refill: 3    4. Chronic cough  -     pantoprazole (PROTONIX) 40 MG tablet; Take 1 tablet (40 mg total) by mouth once daily.  Dispense: 90 tablet; Refill: 3    5. Depression, unspecified depression type  -     losartan (COZAAR) 50 MG tablet; Take 1 tablet (50 mg total) by mouth once daily.  Dispense: 90 tablet; Refill: 3  -     DULoxetine (CYMBALTA) 60 MG capsule; Take 1 capsule (60 mg total) by mouth 2 (two) times daily.  Dispense: 180 capsule; Refill: 3  -     buPROPion (WELLBUTRIN XL) 150 MG TB24 tablet; Take 1 tablet (150 mg total) by mouth once daily.  Dispense: 90 tablet; Refill: 0    6. Screening for malignant  neoplasm of colon    7. DM type 2 with diabetic mixed hyperlipidemia  -     rosuvastatin (CRESTOR) 20 MG tablet; Take 1 tablet (20 mg total) by mouth every evening.  Dispense: 90 tablet; Refill: 3    8. Restless leg syndrome  -     rOPINIRole (REQUIP) 2 MG tablet; Take 1 tablet (2 mg total) by mouth 2 (two) times daily.  Dispense: 180 tablet; Refill: 3    9. Type 2 diabetes mellitus with diabetic polyneuropathy, with long-term current use of insulin  -     pregabalin (LYRICA) 150 MG capsule; Take 1 capsule (150 mg total) by mouth 3 (three) times daily.  Dispense: 270 capsule; Refill: 1  -     metFORMIN (GLUCOPHAGE) 1000 MG tablet; Take 1 tablet (1,000 mg total) by mouth 2 (two) times daily.  Dispense: 180 tablet; Refill: 1    10. Gastroesophageal reflux disease without esophagitis  -     pantoprazole (PROTONIX) 40 MG tablet; Take 1 tablet (40 mg total) by mouth once daily.  Dispense: 90 tablet; Refill: 3    11. Urinary incontinence, unspecified type  -     oxybutynin (DITROPAN-XL) 10 MG 24 hr tablet; Take 1 tablet (10 mg total) by mouth once daily.  Dispense: 90 tablet; Refill: 3    12. Diastolic dysfunction  -     furosemide (LASIX) 40 MG tablet; Take 1 tablet (40 mg total) by mouth 2 (two) times daily.  Dispense: 180 tablet; Refill: 1  -     spironolactone (ALDACTONE) 50 MG tablet; Take 1 tablet (50 mg total) by mouth once daily.  Dispense: 360 tablet; Refill: 0  -     carvediloL (COREG) 3.125 MG tablet; Take 1 tablet (3.125 mg total) by mouth 2 (two) times daily with meals.  Dispense: 180 tablet; Refill: 3    13. Seasonal allergies  -     desloratadine (CLARINEX) 5 mg tablet; Take 1 tablet (5 mg total) by mouth once daily.  Dispense: 90 tablet; Refill: 3    14. Panic disorder  -     ALPRAZolam (XANAX) 1 MG tablet; Take 1 tablet (1 mg total) by mouth 2 (two) times daily.  Dispense: 60 tablet; Refill: 2    15. Vitamin D deficiency  -     ergocalciferol (ERGOCALCIFEROL) 50,000 unit Cap; Take 1 capsule (50,000 Units  total) by mouth every 7 days.  Dispense: 12 capsule; Refill: 3    16. Nausea  -     promethazine (PHENERGAN) 25 MG tablet; Take 1 tablet (25 mg total) by mouth before meals as needed for Nausea.  Dispense: 30 tablet; Refill: 1  -     Ambulatory referral/consult to Gastroenterology; Future; Expected date: 10/09/2023    Other orders  -     tiZANidine (ZANAFLEX) 4 MG tablet; Take 1 tablet (4 mg total) by mouth 3 (three) times daily. Take three times daily  Dispense: 180 tablet; Refill: 3  -     mupirocin (BACTROBAN) 2 % ointment; Apply topically 3 (three) times daily.  Dispense: 30 g; Refill: 6        I spent 30 minutes on this encounter, time includes face-to-face, chart review, documentation, test review and orders.           Follow up in about 6 months (around 4/2/2024).

## 2023-10-03 ENCOUNTER — TELEPHONE (OUTPATIENT)
Dept: PRIMARY CARE CLINIC | Facility: CLINIC | Age: 63
End: 2023-10-03
Payer: MEDICARE

## 2023-10-03 RX ORDER — POTASSIUM CHLORIDE 750 MG/1
10 TABLET, EXTENDED RELEASE ORAL DAILY
Qty: 90 TABLET | Refills: 3 | Status: SHIPPED | OUTPATIENT
Start: 2023-10-03

## 2023-10-03 RX ORDER — TIRZEPATIDE 2.5 MG/.5ML
2.5 INJECTION, SOLUTION SUBCUTANEOUS
Qty: 4 PEN | Refills: 11 | Status: SHIPPED | OUTPATIENT
Start: 2023-10-03 | End: 2023-10-17

## 2023-10-03 NOTE — TELEPHONE ENCOUNTER
----- Message from Fer Herring III, PA-C sent at 10/3/2023  6:46 AM CDT -----  Amanda Barton    I reviewed your lab work. Your potassium is low due to the lasix you are taking. I have sent you in some potassium to get started on. Take one daily. Recommend to follow up with Endocrinology regarding you diabetes lab work.

## 2023-10-05 ENCOUNTER — TELEPHONE (OUTPATIENT)
Dept: ENDOCRINOLOGY | Facility: CLINIC | Age: 63
End: 2023-10-05
Payer: MEDICARE

## 2023-10-05 NOTE — TELEPHONE ENCOUNTER
----- Message from Nena Warren sent at 10/5/2023 10:46 AM CDT -----  Type:  Needs Medical Advice    Who Called: pt   Best Call Back Number: [phone] (home)     Additional Information:  Requesting call back  pt out of diabetic supplies  out of decxom monitor       please advise thank you

## 2023-10-05 NOTE — TELEPHONE ENCOUNTER
Pt states needs refills on Dexcom G7. She does not know which DME sents it to her. Do you know where you sent it last time??  She wants to know if [facility] can giver her samples in the meantime because she is almost out

## 2023-10-05 NOTE — TELEPHONE ENCOUNTER
Ok, I'll send an e-mail to Aretha to see if she knows where it went. Do you know if [facility] has any samples to give her. If so, can you pls let pt know? thanks

## 2023-10-11 ENCOUNTER — PATIENT MESSAGE (OUTPATIENT)
Dept: ENDOCRINOLOGY | Facility: CLINIC | Age: 63
End: 2023-10-11
Payer: MEDICARE

## 2023-10-12 ENCOUNTER — PATIENT MESSAGE (OUTPATIENT)
Dept: PRIMARY CARE CLINIC | Facility: CLINIC | Age: 63
End: 2023-10-12
Payer: MEDICARE

## 2023-10-12 ENCOUNTER — PATIENT MESSAGE (OUTPATIENT)
Dept: ENDOCRINOLOGY | Facility: CLINIC | Age: 63
End: 2023-10-12
Payer: MEDICARE

## 2023-10-12 DIAGNOSIS — R30.0 DYSURIA: Primary | ICD-10-CM

## 2023-10-13 NOTE — PROGRESS NOTES
The patient location is: home  The chief complaint leading to consultation is: diabetes    Visit type: audiovisual    Face to Face time with patient:  24 mins  30 minutes of total time spent on the encounter, which includes face to face time and non-face to face time preparing to see the patient (eg, review of tests), Obtaining and/or reviewing separately obtained history, Documenting clinical information in the electronic or other health record, Independently interpreting results (not separately reported) and communicating results to the patient/family/caregiver, or Care coordination (not separately reported).     Each patient to whom he or she provides medical services by telemedicine is:  (1) informed of the relationship between the physician and patient and the respective role of any other health care provider with respect to management of the patient; and (2) notified that he or she may decline to receive medical services by telemedicine and may withdraw from such care at any time.    CC: This 63 y.o. female presents for management of diabetes  and chronic conditions pending review including , HLP, afib, BJ, CHF, morbid obesity    HPI: She was diagnosed with T2DM in the 1990s. Has  been hospitalized r/t DM for bg in the 500s.   Family hx of DM: daughter  Her granddaughter passed away on May 5th 2023    Since last visit, continues the grieving process   13% Very High  36% High  48% In Range  1% Low  2% Very Low  GMI 7.6%  Does report some stress eating   Also large bg spikes in am without food, only drinks Diet Dr Pepper at that time  Hypos not patterned and appear random, she is symptomatic   Diet: Eats 1 Meals a day, snacks- denies  Skips breakfast ; eats lunch or dinner  Exercise: none  CURRENT DM MEDS: Lantus 30 u qhs; metformin 1000 mg bid,  Ozempic 0.5 mg weekly, Sunday, Novolog 8 u AC + correction   Reports she is taking 5-15 mins prior to a meal and skips if not having a meal  Vial/pen:  Uses  pens  Glucometer type:  Jocelyn contour    Standards of Care:  Eye exam: 2022 - will be seeing Dr. Raymundo    Cardiologist follows w Dr Williamson     ROS:   Gen: Appetite good,   Resp: no SOB or LEMOS   Cardiac: No palpitations, chest pain   GI: No nausea or vomiting, diarrhea, constipation   /GYN: 2+ nocturia, no burning or pain.   MS/Neuro: no numbness/ tingling in BLE;  speech clear  Psych: Denies drug/ETOH abuse, + depression.  Other systems: negative.    PE:  GENERAL: Well developed, well nourished.  PSYCH: AAOx3, appropriate mood and affect, pleasant expression, conversant, appears relaxed, well groomed.   EYES: Conjunctiva, corneas clear  NECK: Supple, trachea midline,   FOOT EXAMINATION: 2/2023      Personally reviewed Past Medical, Surgical, Social History.    There were no vitals taken for this visit.     Personally reviewed the below labs:      Chemistry        Component Value Date/Time     10/02/2023 1331    K 3.2 (L) 10/02/2023 1331    CL 99 10/02/2023 1331    CO2 30 (H) 10/02/2023 1331    BUN 15 10/02/2023 1331    CREATININE 0.9 10/02/2023 1331     (H) 10/02/2023 1331        Component Value Date/Time    CALCIUM 8.6 (L) 10/02/2023 1331    ALKPHOS 112 10/02/2023 1331    AST 17 10/02/2023 1331    ALT 12 10/02/2023 1331    BILITOT 0.6 10/02/2023 1331    ESTGFRAFRICA >60 11/21/2019 0526    EGFRNONAA >60 11/21/2019 0526            Lab Results   Component Value Date    TSH 0.966 07/11/2023       Recent Labs   Lab 10/02/23  1331   LDL Cholesterol 69.8   HDL 36 L   Cholesterol 140        No results found for this or any previous visit.  No results found for this or any previous visit.    Lab Results   Component Value Date    MICALBCREAT 94.1 (H) 02/16/2023       Hemoglobin A1C   Date Value Ref Range Status   10/02/2023 7.2 (H) 4.0 - 5.6 % Final     Comment:     ADA Screening Guidelines:  5.7-6.4%  Consistent with prediabetes  >or=6.5%  Consistent with diabetes    High levels of fetal hemoglobin interfere  with the HbA1C  assay. Heterozygous hemoglobin variants (HbS, HgC, etc)do  not significantly interfere with this assay.   However, presence of multiple variants may affect accuracy.     07/27/2023 6.7 (H) 4.0 - 5.6 % Final     Comment:     ADA Screening Guidelines:  5.7-6.4%  Consistent with prediabetes  >or=6.5%  Consistent with diabetes    High levels of fetal hemoglobin interfere with the HbA1C  assay. Heterozygous hemoglobin variants (HbS, HgC, etc)do  not significantly interfere with this assay.   However, presence of multiple variants may affect accuracy.     05/15/2023 7.8 (H) 4.0 - 5.6 % Final     Comment:     ADA Screening Guidelines:  5.7-6.4%  Consistent with prediabetes  >or=6.5%  Consistent with diabetes    High levels of fetal hemoglobin interfere with the HbA1C  assay. Heterozygous hemoglobin variants (HbS, HgC, etc)do  not significantly interfere with this assay.   However, presence of multiple variants may affect accuracy.          ASSESSMENT and PLAN:      1. T2DM with hyperglycemia, DM PN, microalbuminuria   Continue Lantus 30 u qhs,  Decrease Novolog 6 u AC +correction 150/25, metformin 1000 mg bid  Just take SSI if skipping meal and bg elevated  D/c ozempic (use what she has left)  I suspect that some of her hypos and hypers may be related to lack on intake or possibly the wrong intake- she would like to meet w javier again but is requesting virtually  Make sure she's pairing protein and carb, try to at least get in 2 small meals a day  Start Mounjaro 5 mg weekly    Continue Dexcom G7- message me in 2 weeks after mounjaro start to review or sooner for issues    2. HLP - on statin therapy,    3. H/o afib, CHF- follows w Dr Williamson, avoid TZD      4. Depression- being treated by PCP, grieving death of her granddaughter     5. Morbid obesity- activity limited r/t limited mobility and stress       Follow-up: in 3 months with lab prior

## 2023-10-17 ENCOUNTER — OFFICE VISIT (OUTPATIENT)
Dept: ENDOCRINOLOGY | Facility: CLINIC | Age: 63
End: 2023-10-17
Payer: MEDICARE

## 2023-10-17 DIAGNOSIS — E11.42 TYPE 2 DIABETES MELLITUS WITH DIABETIC POLYNEUROPATHY, WITH LONG-TERM CURRENT USE OF INSULIN: ICD-10-CM

## 2023-10-17 DIAGNOSIS — Z79.4 TYPE 2 DIABETES MELLITUS WITH DIABETIC POLYNEUROPATHY, WITH LONG-TERM CURRENT USE OF INSULIN: ICD-10-CM

## 2023-10-17 DIAGNOSIS — E11.29 TYPE 2 DIABETES MELLITUS WITH MICROALBUMINURIA, WITH LONG-TERM CURRENT USE OF INSULIN: Primary | ICD-10-CM

## 2023-10-17 DIAGNOSIS — R80.9 TYPE 2 DIABETES MELLITUS WITH MICROALBUMINURIA, WITH LONG-TERM CURRENT USE OF INSULIN: Primary | ICD-10-CM

## 2023-10-17 DIAGNOSIS — Z79.4 TYPE 2 DIABETES MELLITUS WITH HYPERGLYCEMIA, WITH LONG-TERM CURRENT USE OF INSULIN: ICD-10-CM

## 2023-10-17 DIAGNOSIS — E11.65 TYPE 2 DIABETES MELLITUS WITH HYPERGLYCEMIA, WITH LONG-TERM CURRENT USE OF INSULIN: ICD-10-CM

## 2023-10-17 DIAGNOSIS — Z79.4 TYPE 2 DIABETES MELLITUS WITH MICROALBUMINURIA, WITH LONG-TERM CURRENT USE OF INSULIN: Primary | ICD-10-CM

## 2023-10-17 PROCEDURE — 3051F PR MOST RECENT HEMOGLOBIN A1C LEVEL 7.0 - < 8.0%: ICD-10-PCS | Mod: CPTII,95,, | Performed by: NURSE PRACTITIONER

## 2023-10-17 PROCEDURE — 4010F PR ACE/ARB THEARPY RXD/TAKEN: ICD-10-PCS | Mod: CPTII,95,, | Performed by: NURSE PRACTITIONER

## 2023-10-17 PROCEDURE — 99214 PR OFFICE/OUTPT VISIT, EST, LEVL IV, 30-39 MIN: ICD-10-PCS | Mod: 95,,, | Performed by: NURSE PRACTITIONER

## 2023-10-17 PROCEDURE — 99214 OFFICE O/P EST MOD 30 MIN: CPT | Mod: 95,,, | Performed by: NURSE PRACTITIONER

## 2023-10-17 PROCEDURE — 95251 CONT GLUC MNTR ANALYSIS I&R: CPT | Mod: NDTC,S$GLB,, | Performed by: NURSE PRACTITIONER

## 2023-10-17 PROCEDURE — 3060F POS MICROALBUMINURIA REV: CPT | Mod: CPTII,95,, | Performed by: NURSE PRACTITIONER

## 2023-10-17 PROCEDURE — 3051F HG A1C>EQUAL 7.0%<8.0%: CPT | Mod: CPTII,95,, | Performed by: NURSE PRACTITIONER

## 2023-10-17 PROCEDURE — 4010F ACE/ARB THERAPY RXD/TAKEN: CPT | Mod: CPTII,95,, | Performed by: NURSE PRACTITIONER

## 2023-10-17 PROCEDURE — 3066F PR DOCUMENTATION OF TREATMENT FOR NEPHROPATHY: ICD-10-PCS | Mod: CPTII,95,, | Performed by: NURSE PRACTITIONER

## 2023-10-17 PROCEDURE — 95251 PR GLUCOSE MONITOR, 72 HOUR, PHYS INTERP: ICD-10-PCS | Mod: NDTC,S$GLB,, | Performed by: NURSE PRACTITIONER

## 2023-10-17 PROCEDURE — 3066F NEPHROPATHY DOC TX: CPT | Mod: CPTII,95,, | Performed by: NURSE PRACTITIONER

## 2023-10-17 PROCEDURE — 3060F PR POS MICROALBUMINURIA RESULT DOCUMENTED/REVIEW: ICD-10-PCS | Mod: CPTII,95,, | Performed by: NURSE PRACTITIONER

## 2023-10-17 RX ORDER — TIRZEPATIDE 5 MG/.5ML
5 INJECTION, SOLUTION SUBCUTANEOUS
Qty: 4 PEN | Refills: 1 | Status: SHIPPED | OUTPATIENT
Start: 2023-10-17 | End: 2023-12-27

## 2023-10-17 RX ORDER — INSULIN ASPART 100 [IU]/ML
INJECTION, SOLUTION INTRAVENOUS; SUBCUTANEOUS
Qty: 6 EACH | Refills: 12
Start: 2023-10-17 | End: 2023-12-25 | Stop reason: SDUPTHER

## 2023-10-26 ENCOUNTER — CLINICAL SUPPORT (OUTPATIENT)
Dept: PRIMARY CARE CLINIC | Facility: CLINIC | Age: 63
End: 2023-10-26
Payer: MEDICARE

## 2023-10-26 ENCOUNTER — TELEPHONE (OUTPATIENT)
Dept: PRIMARY CARE CLINIC | Facility: CLINIC | Age: 63
End: 2023-10-26

## 2023-10-26 DIAGNOSIS — Z12.11 SCREENING FOR MALIGNANT NEOPLASM OF COLON: ICD-10-CM

## 2023-10-26 DIAGNOSIS — R80.9 TYPE 2 DIABETES MELLITUS WITH MICROALBUMINURIA, WITH LONG-TERM CURRENT USE OF INSULIN: Primary | ICD-10-CM

## 2023-10-26 DIAGNOSIS — R31.9 URINARY TRACT INFECTION WITH HEMATURIA, SITE UNSPECIFIED: ICD-10-CM

## 2023-10-26 DIAGNOSIS — N39.0 URINARY TRACT INFECTION WITH HEMATURIA, SITE UNSPECIFIED: ICD-10-CM

## 2023-10-26 DIAGNOSIS — E11.65 TYPE 2 DIABETES MELLITUS WITH HYPERGLYCEMIA, WITH LONG-TERM CURRENT USE OF INSULIN: ICD-10-CM

## 2023-10-26 DIAGNOSIS — Z79.4 TYPE 2 DIABETES MELLITUS WITH HYPERGLYCEMIA, WITH LONG-TERM CURRENT USE OF INSULIN: ICD-10-CM

## 2023-10-26 DIAGNOSIS — J06.9 UPPER RESPIRATORY TRACT INFECTION, UNSPECIFIED TYPE: ICD-10-CM

## 2023-10-26 DIAGNOSIS — E11.29 TYPE 2 DIABETES MELLITUS WITH MICROALBUMINURIA, WITH LONG-TERM CURRENT USE OF INSULIN: Primary | ICD-10-CM

## 2023-10-26 DIAGNOSIS — Z79.4 TYPE 2 DIABETES MELLITUS WITH MICROALBUMINURIA, WITH LONG-TERM CURRENT USE OF INSULIN: Primary | ICD-10-CM

## 2023-10-26 PROCEDURE — 87186 SC STD MICRODIL/AGAR DIL: CPT | Performed by: PHYSICIAN ASSISTANT

## 2023-10-26 PROCEDURE — 87088 URINE BACTERIA CULTURE: CPT | Performed by: PHYSICIAN ASSISTANT

## 2023-10-26 PROCEDURE — 87077 CULTURE AEROBIC IDENTIFY: CPT | Performed by: PHYSICIAN ASSISTANT

## 2023-10-26 PROCEDURE — 87086 URINE CULTURE/COLONY COUNT: CPT | Performed by: PHYSICIAN ASSISTANT

## 2023-10-26 RX ORDER — INSULIN GLARGINE 100 [IU]/ML
30 INJECTION, SOLUTION SUBCUTANEOUS
Qty: 9 ML | Refills: 5 | Status: SHIPPED | OUTPATIENT
Start: 2023-10-26 | End: 2024-04-02 | Stop reason: SDUPTHER

## 2023-10-26 RX ORDER — NITROFURANTOIN 25; 75 MG/1; MG/1
100 CAPSULE ORAL 2 TIMES DAILY
Qty: 20 CAPSULE | Refills: 0 | Status: SHIPPED | OUTPATIENT
Start: 2023-10-26 | End: 2023-12-22

## 2023-10-27 ENCOUNTER — OFFICE VISIT (OUTPATIENT)
Dept: PRIMARY CARE CLINIC | Facility: CLINIC | Age: 63
End: 2023-10-27
Payer: MEDICARE

## 2023-10-27 VITALS
BODY MASS INDEX: 41.78 KG/M2 | WEIGHT: 221.31 LBS | HEIGHT: 61 IN | OXYGEN SATURATION: 97 % | HEART RATE: 85 BPM | DIASTOLIC BLOOD PRESSURE: 74 MMHG | SYSTOLIC BLOOD PRESSURE: 132 MMHG

## 2023-10-27 DIAGNOSIS — Z79.4 TYPE 2 DIABETES MELLITUS WITH MICROALBUMINURIA, WITH LONG-TERM CURRENT USE OF INSULIN: ICD-10-CM

## 2023-10-27 DIAGNOSIS — R80.9 TYPE 2 DIABETES MELLITUS WITH MICROALBUMINURIA, WITH LONG-TERM CURRENT USE OF INSULIN: ICD-10-CM

## 2023-10-27 DIAGNOSIS — I48.0 PAROXYSMAL ATRIAL FIBRILLATION: ICD-10-CM

## 2023-10-27 DIAGNOSIS — H26.9 CATARACT, UNSPECIFIED CATARACT TYPE, UNSPECIFIED LATERALITY: ICD-10-CM

## 2023-10-27 DIAGNOSIS — I50.32 CHRONIC DIASTOLIC HEART FAILURE: ICD-10-CM

## 2023-10-27 DIAGNOSIS — I10 HYPERTENSION, UNSPECIFIED TYPE: ICD-10-CM

## 2023-10-27 DIAGNOSIS — Z01.818 PREOP EXAMINATION: Primary | ICD-10-CM

## 2023-10-27 DIAGNOSIS — E11.29 TYPE 2 DIABETES MELLITUS WITH MICROALBUMINURIA, WITH LONG-TERM CURRENT USE OF INSULIN: ICD-10-CM

## 2023-10-27 PROBLEM — Z79.01 CHRONIC ANTICOAGULATION: Status: RESOLVED | Noted: 2019-03-06 | Resolved: 2023-10-27

## 2023-10-27 PROBLEM — R78.81 BACTEREMIA DUE TO METHICILLIN RESISTANT STAPHYLOCOCCUS AUREUS: Status: RESOLVED | Noted: 2017-11-20 | Resolved: 2023-10-27

## 2023-10-27 PROBLEM — B95.62 BACTEREMIA DUE TO METHICILLIN RESISTANT STAPHYLOCOCCUS AUREUS: Status: RESOLVED | Noted: 2017-11-20 | Resolved: 2023-10-27

## 2023-10-27 PROCEDURE — 3078F DIAST BP <80 MM HG: CPT | Mod: CPTII,S$GLB,, | Performed by: PHYSICIAN ASSISTANT

## 2023-10-27 PROCEDURE — 3075F SYST BP GE 130 - 139MM HG: CPT | Mod: CPTII,S$GLB,, | Performed by: PHYSICIAN ASSISTANT

## 2023-10-27 PROCEDURE — 3008F PR BODY MASS INDEX (BMI) DOCUMENTED: ICD-10-PCS | Mod: CPTII,S$GLB,, | Performed by: PHYSICIAN ASSISTANT

## 2023-10-27 PROCEDURE — 3060F PR POS MICROALBUMINURIA RESULT DOCUMENTED/REVIEW: ICD-10-PCS | Mod: CPTII,S$GLB,, | Performed by: PHYSICIAN ASSISTANT

## 2023-10-27 PROCEDURE — 99214 PR OFFICE/OUTPT VISIT, EST, LEVL IV, 30-39 MIN: ICD-10-PCS | Mod: S$GLB,,, | Performed by: PHYSICIAN ASSISTANT

## 2023-10-27 PROCEDURE — 4010F ACE/ARB THERAPY RXD/TAKEN: CPT | Mod: CPTII,S$GLB,, | Performed by: PHYSICIAN ASSISTANT

## 2023-10-27 PROCEDURE — 3066F NEPHROPATHY DOC TX: CPT | Mod: CPTII,S$GLB,, | Performed by: PHYSICIAN ASSISTANT

## 2023-10-27 PROCEDURE — 1159F PR MEDICATION LIST DOCUMENTED IN MEDICAL RECORD: ICD-10-PCS | Mod: CPTII,S$GLB,, | Performed by: PHYSICIAN ASSISTANT

## 2023-10-27 PROCEDURE — 3051F HG A1C>EQUAL 7.0%<8.0%: CPT | Mod: CPTII,S$GLB,, | Performed by: PHYSICIAN ASSISTANT

## 2023-10-27 PROCEDURE — 99214 OFFICE O/P EST MOD 30 MIN: CPT | Mod: S$GLB,,, | Performed by: PHYSICIAN ASSISTANT

## 2023-10-27 PROCEDURE — 3051F PR MOST RECENT HEMOGLOBIN A1C LEVEL 7.0 - < 8.0%: ICD-10-PCS | Mod: CPTII,S$GLB,, | Performed by: PHYSICIAN ASSISTANT

## 2023-10-27 PROCEDURE — 3060F POS MICROALBUMINURIA REV: CPT | Mod: CPTII,S$GLB,, | Performed by: PHYSICIAN ASSISTANT

## 2023-10-27 PROCEDURE — 1159F MED LIST DOCD IN RCRD: CPT | Mod: CPTII,S$GLB,, | Performed by: PHYSICIAN ASSISTANT

## 2023-10-27 PROCEDURE — 3008F BODY MASS INDEX DOCD: CPT | Mod: CPTII,S$GLB,, | Performed by: PHYSICIAN ASSISTANT

## 2023-10-27 PROCEDURE — 3078F PR MOST RECENT DIASTOLIC BLOOD PRESSURE < 80 MM HG: ICD-10-PCS | Mod: CPTII,S$GLB,, | Performed by: PHYSICIAN ASSISTANT

## 2023-10-27 PROCEDURE — 4010F PR ACE/ARB THEARPY RXD/TAKEN: ICD-10-PCS | Mod: CPTII,S$GLB,, | Performed by: PHYSICIAN ASSISTANT

## 2023-10-27 PROCEDURE — 3075F PR MOST RECENT SYSTOLIC BLOOD PRESS GE 130-139MM HG: ICD-10-PCS | Mod: CPTII,S$GLB,, | Performed by: PHYSICIAN ASSISTANT

## 2023-10-27 PROCEDURE — 3066F PR DOCUMENTATION OF TREATMENT FOR NEPHROPATHY: ICD-10-PCS | Mod: CPTII,S$GLB,, | Performed by: PHYSICIAN ASSISTANT

## 2023-10-27 NOTE — PROGRESS NOTES
Subjective     Patient ID: Amanda Barton is a 63 y.o. female.    Chief Complaint: Pre-op Exam    Patient is a 64 yo female here for a cataract surgery pre-op. She voices no acute complaints.     Patient Active Problem List:     History of non-ST elevation myocardial infarction (NSTEMI)     BJ (obstructive sleep apnea)     Chronic diastolic heart failure     Diastolic dysfunction     Paroxysmal atrial fibrillation     Pericarditis     Cough     Sepsis     Febrile illness     Iron deficiency anemia     Troponin level elevated     Obesity     Symptomatic anemia     Constipation     GI bleed     Type 2 diabetes mellitus with hyperglycemia, with long-term current use of insulin     Type 2 diabetes mellitus with diabetic polyneuropathy, with long-term current use of insulin     Type 2 diabetes mellitus with microalbuminuria, with long-term current use of insulin     Chronic obstructive pulmonary disease, unspecified COPD type     Insomnia     Osteoarthritis of lumbar spine     Depression     DM type 2 with diabetic mixed hyperlipidemia     Gastroesophageal reflux disease without esophagitis     Urinary incontinence     Seasonal allergies     Panic disorder     Vitamin D deficiency     Nausea     Restless leg syndrome     Hypertension     Reviewed list    Past Medical History:  12/02/2017: Acute bacterial pericarditis  No date: Anemia  11/20/2017: Bacteremia due to methicillin resistant Staphylococcus   aureus  No date: Diabetes mellitus  No date: Encounter for blood transfusion  No date: GERD (gastroesophageal reflux disease)  No date: Heart murmur  No date: History of pneumonia  No date: History of UTI  No date: Hypertension  No date: Migraine headache  No date: Sleep apnea      Comment:  no machine yet  11/19/2017: Type 2 diabetes mellitus without complication, with long-  term current use of insulin    Past Surgical History:  No date: APPENDECTOMY  2/22/2023: CATHETERIZATION OF BOTH LEFT AND RIGHT HEART; N/A       Comment:  Procedure: Right and Left Heart Cath;  Surgeon: Osman Williamson MD;  Location: [facility] CATH;  Service: Cardiology;               Laterality: N/A;  No date:  SECTION  No date: CHOLECYSTECTOMY  2017: COLONOSCOPY; N/A      Comment:  Procedure: COLONOSCOPY;  Surgeon: Juan Lepe MD;  Location: [facility] ENDO;  Service: Endoscopy;                 Laterality: N/A;  2019: ESOPHAGOGASTRODUODENOSCOPY; N/A      Comment:  Procedure: EGD (ESOPHAGOGASTRODUODENOSCOPY);  Surgeon:                Gustavo Austin MD;  Location: [facility] ENDO;  Service:                Endoscopy;  Laterality: N/A;  with Push Enteroscopy  2019: ESOPHAGOGASTRODUODENOSCOPY; N/A      Comment:  Procedure: EGD (ESOPHAGOGASTRODUODENOSCOPY);  Surgeon:                Gustavo Austin MD;  Location: [facility] ENDO;  Service:                Endoscopy;  Laterality: N/A;  Push enteroscopy  No date: HYSTERECTOMY  2022: IRRIGATION AND DEBRIDEMENT OF LUMBAR SPINE  2021: LUMBAR FUSION      Comment:  L4-L5    Review of patient's family history indicates:  Problem: No Known Problems      Relation: Mother          Age of Onset: (Not Specified)  Problem: Heart disease      Relation: Father          Age of Onset: (Not Specified)  Problem: Heart failure      Relation: Father          Age of Onset: (Not Specified)  Problem: Arrhythmia      Relation: Father          Age of Onset: (Not Specified)      Social History    Socioeconomic History      Marital status:     Tobacco Use      Smoking status: Never      Smokeless tobacco: Never    Substance and Sexual Activity      Alcohol use: No      Review of patient's allergies indicates:   -- Ciprofloxacin -- Nausea And Vomiting    Current Outpatient Medications: ·  ALPRAZolam (XANAX) 1 MG tablet, Take 1 tablet (1 mg total) by mouth 2 (two) times daily., Disp: 60 tablet, Rfl: 2·  buPROPion (WELLBUTRIN XL) 150 MG TB24 tablet, Take 1 tablet (150 mg total) by  mouth once daily., Disp: 90 tablet, Rfl: 0·  carvediloL (COREG) 3.125 MG tablet, Take 1 tablet (3.125 mg total) by mouth 2 (two) times daily with meals., Disp: 180 tablet, Rfl: 3·  desloratadine (CLARINEX) 5 mg tablet, Take 1 tablet (5 mg total) by mouth once daily., Disp: 90 tablet, Rfl: 3·  DULoxetine (CYMBALTA) 60 MG capsule, Take 1 capsule (60 mg total) by mouth 2 (two) times daily., Disp: 180 capsule, Rfl: 3·  ergocalciferol (ERGOCALCIFEROL) 50,000 unit Cap, Take 1 capsule (50,000 Units total) by mouth every 7 days., Disp: 12 capsule, Rfl: 3·  furosemide (LASIX) 40 MG tablet, Take 1 tablet (40 mg total) by mouth 2 (two) times daily., Disp: 180 tablet, Rfl: 1·  ibuprofen (ADVIL,MOTRIN) 800 MG tablet, Take 800 mg by mouth 3 (three) times daily as needed. As needed, Disp: , Rfl: ·  LANTUS SOLOSTAR U-100 INSULIN glargine 100 units/mL SubQ pen, INJECT 30 UNITS INTO THE SKIN EVERY EVENING., Disp: 9 mL, Rfl: 5·  losartan (COZAAR) 50 MG tablet, Take 1 tablet (50 mg total) by mouth once daily., Disp: 90 tablet, Rfl: 3·  metFORMIN (GLUCOPHAGE) 1000 MG tablet, Take 1 tablet (1,000 mg total) by mouth 2 (two) times daily., Disp: 180 tablet, Rfl: 1·  montelukast (SINGULAIR) 10 mg tablet, Take 1 tablet (10 mg total) by mouth every evening., Disp: 90 tablet, Rfl: 3·  mupirocin (BACTROBAN) 2 % ointment, APPLY 1 GRAM TO EACH NOSTRIL TWICE DAILY X 5 DAYS , START 2-20-23, Disp: 22 g, Rfl: 0·  mupirocin (BACTROBAN) 2 % ointment, Apply topically 3 (three) times daily., Disp: 30 g, Rfl: 6·  nitrofurantoin, macrocrystal-monohydrate, (MACROBID) 100 MG capsule, Take 1 capsule (100 mg total) by mouth 2 (two) times daily., Disp: 20 capsule, Rfl: 0·  NOVOLOG FLEXPEN U-100 INSULIN 100 unit/mL (3 mL) InPn pen, 6 u Ac + correction  Max TDD 54u, Disp: 6 each, Rfl: 12·  oxybutynin (DITROPAN-XL) 10 MG 24 hr tablet, Take 1 tablet (10 mg total) by mouth once daily., Disp: 90 tablet, Rfl: 3·  oxyCODONE-acetaminophen (PERCOCET) 5-325 mg per tablet,  "Take 1 tablet by mouth every 4 to 6 hours as needed for Pain., Disp: 27 tablet, Rfl: 0·  pantoprazole (PROTONIX) 40 MG tablet, Take 1 tablet (40 mg total) by mouth once daily., Disp: 90 tablet, Rfl: 3·  pen needle, diabetic (BD KARYN 2ND GEN PEN NEEDLE) 32 gauge x 5/32" Ndle, Checks bg 4 times a day, Disp: 150 each, Rfl: 12·  potassium chloride (KLOR-CON) 10 MEQ TbSR, Take 1 tablet (10 mEq total) by mouth once daily., Disp: 90 tablet, Rfl: 3·  pregabalin (LYRICA) 150 MG capsule, Take 1 capsule (150 mg total) by mouth 3 (three) times daily., Disp: 270 capsule, Rfl: 1·  promethazine (PHENERGAN) 25 MG tablet, Take 1 tablet (25 mg total) by mouth before meals as needed for Nausea., Disp: 30 tablet, Rfl: 1·  rOPINIRole (REQUIP) 2 MG tablet, Take 1 tablet (2 mg total) by mouth 2 (two) times daily., Disp: 180 tablet, Rfl: 3·  rosuvastatin (CRESTOR) 20 MG tablet, Take 1 tablet (20 mg total) by mouth every evening., Disp: 90 tablet, Rfl: 3·  spironolactone (ALDACTONE) 50 MG tablet, Take 1 tablet (50 mg total) by mouth once daily., Disp: 360 tablet, Rfl: 0·  SYMBICORT 80-4.5 mcg/actuation HFAA, Inhale 2 puffs into the lungs 2 (two) times a day., Disp: 10.2 g, Rfl: 0·  temazepam (RESTORIL) 30 mg capsule, Take 1 capsule (30 mg total) by mouth nightly as needed for Insomnia., Disp: 30 capsule, Rfl: 2·  TENS units Cristiana, 1 Units by Misc.(Non-Drug; Combo Route) route daily as needed., Disp: 1 each, Rfl: 0·  tirzepatide (MOUNJARO) 5 mg/0.5 mL PnIj, Inject 5 mg into the skin every 7 days., Disp: 4 Pen, Rfl: 1·  tiZANidine (ZANAFLEX) 4 MG tablet, Take 1 tablet (4 mg total) by mouth 3 (three) times daily. Take three times daily, Disp: 180 tablet, Rfl: 3·  TRUE METRIX GLUCOSE TEST STRIP Strp, USE TO TEST BLOOD SUGAR TWICE DAILY, Disp: 50 strip, Rfl: 1    /74   Pulse 85   Ht 5' 1" (1.549 m)   Wt 100.4 kg (221 lb 5.5 oz)   SpO2 97%   BMI 41.82 kg/m²            Review of Systems   Constitutional:  Negative for activity change and " appetite change.   HENT: Negative.     Eyes:  Positive for visual disturbance. Negative for photophobia and pain.   Respiratory:  Negative for apnea, chest tightness, shortness of breath and wheezing.    Cardiovascular:  Negative for chest pain and palpitations.   Gastrointestinal:  Negative for abdominal pain, diarrhea and nausea.   Endocrine: Negative.    Genitourinary: Negative.    Integumentary:  Negative.   Neurological:  Negative for dizziness, weakness, numbness and headaches.   Hematological:  Negative for adenopathy.   Psychiatric/Behavioral: Negative.            Objective     Physical Exam  Vitals reviewed.   Constitutional:       General: She is not in acute distress.     Appearance: Normal appearance. She is not ill-appearing, toxic-appearing or diaphoretic.   HENT:      Head: Normocephalic and atraumatic.   Eyes:      Conjunctiva/sclera: Conjunctivae normal.   Neck:      Vascular: No carotid bruit.   Cardiovascular:      Rate and Rhythm: Normal rate and regular rhythm.      Pulses: Normal pulses.      Heart sounds: Normal heart sounds. No murmur heard.     No friction rub. No gallop.   Pulmonary:      Effort: Pulmonary effort is normal. No respiratory distress.      Breath sounds: Normal breath sounds. No stridor. No wheezing, rhonchi or rales.   Chest:      Chest wall: No tenderness.   Abdominal:      General: There is distension.      Palpations: Abdomen is soft.      Tenderness: There is no abdominal tenderness.   Musculoskeletal:         General: No swelling or tenderness.      Cervical back: No rigidity or tenderness.      Right lower leg: No edema.      Left lower leg: No edema.   Lymphadenopathy:      Cervical: No cervical adenopathy.   Skin:     General: Skin is warm.      Coloration: Skin is not jaundiced.   Neurological:      Mental Status: She is alert.       Lab Results   Component Value Date    WBC 7.48 07/11/2023    HGB 11.7 (L) 07/11/2023    HCT 36.8 (L) 07/11/2023    MCV 87 07/11/2023      07/11/2023     CMP  Sodium   Date Value Ref Range Status   10/02/2023 142 136 - 145 mmol/L Final     Potassium   Date Value Ref Range Status   10/02/2023 3.2 (L) 3.5 - 5.1 mmol/L Final     Chloride   Date Value Ref Range Status   10/02/2023 99 95 - 110 mmol/L Final     CO2   Date Value Ref Range Status   10/02/2023 30 (H) 23 - 29 mmol/L Final     Glucose   Date Value Ref Range Status   10/02/2023 145 (H) 70 - 110 mg/dL Final     BUN   Date Value Ref Range Status   10/02/2023 15 8 - 23 mg/dL Final     Creatinine   Date Value Ref Range Status   10/02/2023 0.9 0.5 - 1.4 mg/dL Final     Calcium   Date Value Ref Range Status   10/02/2023 8.6 (L) 8.7 - 10.5 mg/dL Final     Total Protein   Date Value Ref Range Status   10/02/2023 7.1 6.0 - 8.4 g/dL Final     Albumin   Date Value Ref Range Status   10/02/2023 3.6 3.5 - 5.2 g/dL Final     Total Bilirubin   Date Value Ref Range Status   10/02/2023 0.6 0.1 - 1.0 mg/dL Final     Comment:     For infants and newborns, interpretation of results should be based  on gestational age, weight and in agreement with clinical  observations.    Premature Infant recommended reference ranges:  Up to 24 hours.............<8.0 mg/dL  Up to 48 hours............<12.0 mg/dL  3-5 days..................<15.0 mg/dL  6-29 days.................<15.0 mg/dL       Alkaline Phosphatase   Date Value Ref Range Status   10/02/2023 112 55 - 135 U/L Final     AST   Date Value Ref Range Status   10/02/2023 17 10 - 40 U/L Final     ALT   Date Value Ref Range Status   10/02/2023 12 10 - 44 U/L Final     Anion Gap   Date Value Ref Range Status   10/02/2023 13 8 - 16 mmol/L Final     eGFR if    Date Value Ref Range Status   11/21/2019 >60 >60 mL/min/1.73 m^2 Final     eGFR if non    Date Value Ref Range Status   11/21/2019 >60 >60 mL/min/1.73 m^2 Final     Comment:     Calculation used to obtain the estimated glomerular filtration  rate (eGFR) is the CKD-EPI equation.         Lab Results   Component Value Date    CHOL 140 10/02/2023     Lab Results   Component Value Date    HDL 36 (L) 10/02/2023     Lab Results   Component Value Date    LDLCALC 69.8 10/02/2023     Lab Results   Component Value Date    TRIG 171 (H) 10/02/2023     Lab Results   Component Value Date    CHOLHDL 25.7 10/02/2023     Lab Results   Component Value Date    HGBA1C 7.2 (H) 10/02/2023         Assessment and Plan     1. Preop examination  Patient is a low risk for cataract surgery. May proceed as scheduled    2. Cataract, unspecified cataract type, unspecified laterality    3. Type 2 diabetes mellitus with microalbuminuria, with long-term current use of insulin  The current medical regimen is effective;  continue present plan and medications.   4. Chronic diastolic heart failure  The current medical regimen is effective;  continue present plan and medications.     5. Hypertension, unspecified type  The current medical regimen is effective;  continue present plan and medications.     6. Paroxysmal atrial fibrillation  The current medical regimen is effective;  continue present plan and medications.       I spent 30 minutes on this encounter, time includes face-to-face, chart review, documentation, test review and orders.           No follow-ups on file.

## 2023-10-28 LAB — BACTERIA UR CULT: ABNORMAL

## 2023-11-16 DIAGNOSIS — G47.00 INSOMNIA, UNSPECIFIED TYPE: ICD-10-CM

## 2023-11-16 RX ORDER — TEMAZEPAM 30 MG/1
30 CAPSULE ORAL NIGHTLY PRN
Qty: 30 CAPSULE | Refills: 2 | Status: SHIPPED | OUTPATIENT
Start: 2023-11-16 | End: 2024-02-14

## 2023-11-29 DIAGNOSIS — R11.0 NAUSEA: ICD-10-CM

## 2023-12-01 RX ORDER — PROMETHAZINE HYDROCHLORIDE 25 MG/1
25 TABLET ORAL
Qty: 30 TABLET | Refills: 1 | Status: SHIPPED | OUTPATIENT
Start: 2023-12-01 | End: 2024-03-06 | Stop reason: SDUPTHER

## 2023-12-11 ENCOUNTER — PATIENT MESSAGE (OUTPATIENT)
Dept: PRIMARY CARE CLINIC | Facility: CLINIC | Age: 63
End: 2023-12-11
Payer: MEDICARE

## 2023-12-11 DIAGNOSIS — I50.32 CHRONIC DIASTOLIC HEART FAILURE: Primary | ICD-10-CM

## 2023-12-11 DIAGNOSIS — R11.0 NAUSEA: ICD-10-CM

## 2023-12-11 RX ORDER — PROMETHAZINE HYDROCHLORIDE 25 MG/1
25 TABLET ORAL
Qty: 30 TABLET | Refills: 1 | OUTPATIENT
Start: 2023-12-11

## 2023-12-18 DIAGNOSIS — J30.2 SEASONAL ALLERGIES: ICD-10-CM

## 2023-12-18 RX ORDER — DESLORATADINE 5 MG/1
5 TABLET ORAL DAILY
Qty: 30 TABLET | Refills: 11 | Status: SHIPPED | OUTPATIENT
Start: 2023-12-18 | End: 2024-04-02 | Stop reason: SDUPTHER

## 2023-12-20 ENCOUNTER — PATIENT OUTREACH (OUTPATIENT)
Dept: ADMINISTRATIVE | Facility: HOSPITAL | Age: 63
End: 2023-12-20
Payer: MEDICARE

## 2023-12-20 NOTE — PROGRESS NOTES
Population Health Chart Review & Patient Outreach Details    Outreach Performed: NO    Additional Pop Health Notes:           Updates Requested / Reviewed:      Updated Care Coordination Note, , Care Team Updated, and Immunizations Reconciliation Completed or Queried: [place]         Health Maintenance Topics Overdue:    Health Maintenance Due   Topic Date Due    Hepatitis C Screening  Never done    Eye Exam  Never done    HIV Screening  Never done    TETANUS VACCINE  Never done    Shingles Vaccine (1 of 2) Never done    Colorectal Cancer Screening  12/31/2018    RSV Vaccine (Age 60+ and Pregnant patients) (1 - 1-dose 60+ series) Never done    Influenza Vaccine (1) Never done    COVID-19 Vaccine (4 - 2023-24 season) 09/01/2023    Foot Exam  02/15/2024         Health Maintenance Topic(s) Outreach Outcomes & Actions Taken:    Eye Exam - Outreach Outcomes & Actions Taken  : External Records Requested & Care Team Updated if Applicable

## 2023-12-20 NOTE — LETTER
AUTHORIZATION FOR RELEASE OF   CONFIDENTIAL INFORMATION    Dear Gisele Jane MD,    We are seeing Amanda Barton, date of birth 1960, in the clinic at [facility]. Fer Herring III, PA-C is the patient's PCP. Amanda Barton has an outstanding lab/procedure at the time we reviewed her chart. In order to help keep her health information updated, she has authorized us to request the following medical record(s):        (  )  MAMMOGRAM                                      (  )  COLONOSCOPY      (  )  PAP SMEAR                                          (  )  OUTSIDE LAB RESULTS     (  )  DEXA SCAN                                          (X) DIABETIC EYE EXAM            (  )  FOOT EXAM                                          (  )  ENTIRE RECORD     (  )  OUTSIDE IMMUNIZATIONS                 (  )  _______________         Please fax records to [facility], Passman, John R. III, PA-C, [phone]    If you have any questions, please contact João Carlson LPN Care Coordinator  at [phone].            Patient Name: Amanda Barton  : 1960  Patient Phone #: [phone]

## 2023-12-22 ENCOUNTER — PATIENT OUTREACH (OUTPATIENT)
Dept: ADMINISTRATIVE | Facility: HOSPITAL | Age: 63
End: 2023-12-22
Payer: MEDICARE

## 2023-12-22 ENCOUNTER — CLINICAL SUPPORT (OUTPATIENT)
Dept: PRIMARY CARE CLINIC | Facility: CLINIC | Age: 63
End: 2023-12-22
Payer: MEDICARE

## 2023-12-22 DIAGNOSIS — R31.9 URINARY TRACT INFECTION WITH HEMATURIA, SITE UNSPECIFIED: Primary | ICD-10-CM

## 2023-12-22 DIAGNOSIS — N39.0 URINARY TRACT INFECTION WITH HEMATURIA, SITE UNSPECIFIED: Primary | ICD-10-CM

## 2023-12-22 DIAGNOSIS — N39.0 URINARY TRACT INFECTION WITHOUT HEMATURIA, SITE UNSPECIFIED: Primary | ICD-10-CM

## 2023-12-22 LAB
BILIRUBIN, UA POC OHS: NEGATIVE
BLOOD, UA POC OHS: NEGATIVE
CLARITY, UA POC OHS: ABNORMAL
COLOR, UA POC OHS: YELLOW
GLUCOSE, UA POC OHS: >=1000
KETONES, UA POC OHS: NEGATIVE
LEUKOCYTES, UA POC OHS: ABNORMAL
NITRITE, UA POC OHS: NEGATIVE
PH, UA POC OHS: 6.5
PROTEIN, UA POC OHS: NEGATIVE
SPECIFIC GRAVITY, UA POC OHS: 1.01
UROBILINOGEN, UA POC OHS: 0.2

## 2023-12-22 PROCEDURE — 87077 CULTURE AEROBIC IDENTIFY: CPT | Performed by: PHYSICIAN ASSISTANT

## 2023-12-22 PROCEDURE — 87088 URINE BACTERIA CULTURE: CPT | Performed by: PHYSICIAN ASSISTANT

## 2023-12-22 PROCEDURE — 87186 SC STD MICRODIL/AGAR DIL: CPT | Performed by: PHYSICIAN ASSISTANT

## 2023-12-22 PROCEDURE — 87086 URINE CULTURE/COLONY COUNT: CPT | Performed by: PHYSICIAN ASSISTANT

## 2023-12-22 PROCEDURE — 81003 URINALYSIS AUTO W/O SCOPE: CPT | Mod: QW,S$GLB,, | Performed by: PHYSICIAN ASSISTANT

## 2023-12-22 PROCEDURE — 81003 POCT URINALYSIS(INSTRUMENT): ICD-10-PCS | Mod: QW,S$GLB,, | Performed by: PHYSICIAN ASSISTANT

## 2023-12-22 RX ORDER — SULFAMETHOXAZOLE AND TRIMETHOPRIM 800; 160 MG/1; MG/1
1 TABLET ORAL 2 TIMES DAILY
Qty: 20 TABLET | Refills: 0 | Status: SHIPPED | OUTPATIENT
Start: 2023-12-22 | End: 2024-01-01

## 2023-12-22 NOTE — PROGRESS NOTES
Population Health Chart Review & Patient Outreach Details    Outreach Performed: NO    Additional Pop Health Notes:           Updates Requested / Reviewed:      Updated Care Coordination Note and Immunizations Reconciliation Completed or Queried: [place]         Health Maintenance Topics Overdue:    Health Maintenance Due   Topic Date Due    Hepatitis C Screening  Never done    HIV Screening  Never done    TETANUS VACCINE  Never done    Shingles Vaccine (1 of 2) Never done    Colorectal Cancer Screening  12/31/2018    RSV Vaccine (Age 60+ and Pregnant patients) (1 - 1-dose 60+ series) Never done    Influenza Vaccine (1) Never done    COVID-19 Vaccine (4 - 2023-24 season) 09/01/2023    Foot Exam  02/15/2024         Health Maintenance Topic(s) Outreach Outcomes & Actions Taken:    Eye Exam - Outreach Outcomes & Actions Taken  : Diabetic Eye External Records Uploaded, Care Team & History Updated if Applicable

## 2023-12-24 LAB — BACTERIA UR CULT: ABNORMAL

## 2023-12-25 DIAGNOSIS — F32.A DEPRESSION, UNSPECIFIED DEPRESSION TYPE: ICD-10-CM

## 2023-12-25 DIAGNOSIS — E11.65 TYPE 2 DIABETES MELLITUS WITH HYPERGLYCEMIA, WITH LONG-TERM CURRENT USE OF INSULIN: ICD-10-CM

## 2023-12-25 DIAGNOSIS — Z79.4 TYPE 2 DIABETES MELLITUS WITH HYPERGLYCEMIA, WITH LONG-TERM CURRENT USE OF INSULIN: ICD-10-CM

## 2023-12-25 DIAGNOSIS — I51.89 DIASTOLIC DYSFUNCTION: ICD-10-CM

## 2023-12-26 DIAGNOSIS — Z79.4 TYPE 2 DIABETES MELLITUS WITH MICROALBUMINURIA, WITH LONG-TERM CURRENT USE OF INSULIN: ICD-10-CM

## 2023-12-26 DIAGNOSIS — E11.29 TYPE 2 DIABETES MELLITUS WITH MICROALBUMINURIA, WITH LONG-TERM CURRENT USE OF INSULIN: ICD-10-CM

## 2023-12-26 DIAGNOSIS — R80.9 TYPE 2 DIABETES MELLITUS WITH MICROALBUMINURIA, WITH LONG-TERM CURRENT USE OF INSULIN: ICD-10-CM

## 2023-12-26 RX ORDER — SPIRONOLACTONE 50 MG/1
50 TABLET, FILM COATED ORAL DAILY
Qty: 360 TABLET | Refills: 0 | Status: SHIPPED | OUTPATIENT
Start: 2023-12-26 | End: 2024-04-02 | Stop reason: SDUPTHER

## 2023-12-26 RX ORDER — INSULIN ASPART 100 [IU]/ML
INJECTION, SOLUTION INTRAVENOUS; SUBCUTANEOUS
Qty: 30 ML | Refills: 11 | Status: SHIPPED | OUTPATIENT
Start: 2023-12-26 | End: 2024-04-02 | Stop reason: SDUPTHER

## 2023-12-26 RX ORDER — BUPROPION HYDROCHLORIDE 150 MG/1
150 TABLET ORAL DAILY
Qty: 90 TABLET | Refills: 0 | Status: SHIPPED | OUTPATIENT
Start: 2023-12-26 | End: 2024-04-02 | Stop reason: SDUPTHER

## 2023-12-26 NOTE — TELEPHONE ENCOUNTER
----- Message from Fer Herring III, PA-C sent at 12/22/2023  4:53 PM CST -----  Amanda Barton    I am sending you in an antibiotic to [facility] for your bladder infection. Continue with antibiotics until gone. Increase water intake. May take tylenol as needed for fever. If your urine culture shows antibiotic resistance, we will notify you. Go to the Emergency Room if your symptoms become much worse. Otherwise, follow up with you PCP as scheduled.

## 2023-12-27 RX ORDER — TIRZEPATIDE 5 MG/.5ML
5 INJECTION, SOLUTION SUBCUTANEOUS
Qty: 4 PEN | Refills: 3 | Status: SHIPPED | OUTPATIENT
Start: 2023-12-27 | End: 2024-02-28 | Stop reason: SDUPTHER

## 2023-12-28 ENCOUNTER — OFFICE VISIT (OUTPATIENT)
Dept: UROLOGY | Facility: CLINIC | Age: 63
End: 2023-12-28
Payer: MEDICARE

## 2023-12-28 VITALS — BODY MASS INDEX: 41.78 KG/M2 | HEIGHT: 61 IN | WEIGHT: 221.31 LBS

## 2023-12-28 DIAGNOSIS — N30.00 ACUTE CYSTITIS WITHOUT HEMATURIA: ICD-10-CM

## 2023-12-28 DIAGNOSIS — R39.15 URINARY URGENCY: Primary | ICD-10-CM

## 2023-12-28 DIAGNOSIS — R30.0 DYSURIA: ICD-10-CM

## 2023-12-28 DIAGNOSIS — N39.3 STRESS INCONTINENCE: ICD-10-CM

## 2023-12-28 DIAGNOSIS — N39.0 RECURRENT UTI: ICD-10-CM

## 2023-12-28 DIAGNOSIS — F41.0 PANIC DISORDER: ICD-10-CM

## 2023-12-28 PROCEDURE — 99214 PR OFFICE/OUTPT VISIT, EST, LEVL IV, 30-39 MIN: ICD-10-PCS | Mod: S$GLB,,,

## 2023-12-28 PROCEDURE — 1159F MED LIST DOCD IN RCRD: CPT | Mod: CPTII,S$GLB,,

## 2023-12-28 PROCEDURE — 3008F BODY MASS INDEX DOCD: CPT | Mod: CPTII,S$GLB,,

## 2023-12-28 PROCEDURE — 3051F HG A1C>EQUAL 7.0%<8.0%: CPT | Mod: CPTII,S$GLB,,

## 2023-12-28 PROCEDURE — 1159F PR MEDICATION LIST DOCUMENTED IN MEDICAL RECORD: ICD-10-PCS | Mod: CPTII,S$GLB,,

## 2023-12-28 PROCEDURE — 3051F PR MOST RECENT HEMOGLOBIN A1C LEVEL 7.0 - < 8.0%: ICD-10-PCS | Mod: CPTII,S$GLB,,

## 2023-12-28 PROCEDURE — 99214 OFFICE O/P EST MOD 30 MIN: CPT | Mod: S$GLB,,,

## 2023-12-28 PROCEDURE — 3066F NEPHROPATHY DOC TX: CPT | Mod: CPTII,S$GLB,,

## 2023-12-28 PROCEDURE — 3066F PR DOCUMENTATION OF TREATMENT FOR NEPHROPATHY: ICD-10-PCS | Mod: CPTII,S$GLB,,

## 2023-12-28 PROCEDURE — 3060F POS MICROALBUMINURIA REV: CPT | Mod: CPTII,S$GLB,,

## 2023-12-28 PROCEDURE — 4010F PR ACE/ARB THEARPY RXD/TAKEN: ICD-10-PCS | Mod: CPTII,S$GLB,,

## 2023-12-28 PROCEDURE — 3060F PR POS MICROALBUMINURIA RESULT DOCUMENTED/REVIEW: ICD-10-PCS | Mod: CPTII,S$GLB,,

## 2023-12-28 PROCEDURE — 3008F PR BODY MASS INDEX (BMI) DOCUMENTED: ICD-10-PCS | Mod: CPTII,S$GLB,,

## 2023-12-28 PROCEDURE — 99999 PR PBB SHADOW E&M-EST. PATIENT-LVL V: CPT | Mod: PBBFAC,,,

## 2023-12-28 PROCEDURE — 4010F ACE/ARB THERAPY RXD/TAKEN: CPT | Mod: CPTII,S$GLB,,

## 2023-12-28 PROCEDURE — 1160F RVW MEDS BY RX/DR IN RCRD: CPT | Mod: CPTII,S$GLB,,

## 2023-12-28 PROCEDURE — 99999 PR PBB SHADOW E&M-EST. PATIENT-LVL V: ICD-10-PCS | Mod: PBBFAC,,,

## 2023-12-28 PROCEDURE — 1160F PR REVIEW ALL MEDS BY PRESCRIBER/CLIN PHARMACIST DOCUMENTED: ICD-10-PCS | Mod: CPTII,S$GLB,,

## 2023-12-28 RX ORDER — ALPRAZOLAM 1 MG/1
1 TABLET ORAL 2 TIMES DAILY
Qty: 60 TABLET | Refills: 2 | Status: SHIPPED | OUTPATIENT
Start: 2023-12-28 | End: 2024-04-02 | Stop reason: SDUPTHER

## 2023-12-28 RX ORDER — OXYBUTYNIN CHLORIDE 5 MG/1
5 TABLET, EXTENDED RELEASE ORAL DAILY
Qty: 90 TABLET | Refills: 3 | Status: SHIPPED | OUTPATIENT
Start: 2023-12-28 | End: 2024-04-02 | Stop reason: SDUPTHER

## 2023-12-28 NOTE — PROGRESS NOTES
[facility] Note  Staff: Sarah Davis FNP-C    PCP: CARMEN Herring    Chief Complaint: Recurrent UTIs    Subjective:        HPI: Amanda Barton is a 63 y.o. female NEW PATIENT presents today for evaluation of recurrent UTIs. She is accompanied by her daughter. She has been experiencing frequent UTIs over the past year. Her urine cultures in Epic have repeatedly been positive for e coli. Her most recent urine culture is from 12/22/2023 and she is currently on a course of bactrim for 10 days. She has many comorbidities including uncontrolled DM. She does have a history of kidney stones but states it has been years since having one. Her symptoms of a UTI include dark urine, malodorous urine, and dysuria. She denies hematuria, fever, flank pain, abd pain, and difficulty urinating. She does admit to leaking urine when coughing, which has become more of a problem for her lately and she is currently wearing pads. She is currently on ditropan 10mg XL from PCP but is also taking diuretics for edema. She denies constipation.     Questions asked pt during office visit today:  Urgency:Yes, incontinence with urgency? No;   Incontinence with laughing or straining: Yes ;   DysuriaNo  Gross HematuriaNo  History of UTI: Yes    History of Kidney Stones?:  Yes    Constipation issues?:  No    REVIEW OF SYSTEMS:  Review of Systems   Constitutional: Negative.  Negative for chills and fever.   HENT: Negative.     Eyes: Negative.    Respiratory: Negative.     Cardiovascular:  Positive for leg swelling.   Gastrointestinal: Negative.  Negative for abdominal pain, constipation, diarrhea, nausea and vomiting.   Genitourinary:  Positive for dysuria, frequency and urgency. Negative for flank pain and hematuria.   Musculoskeletal: Negative.  Negative for back pain.   Skin: Negative.    Neurological:  Positive for weakness.   Endo/Heme/Allergies: Negative.    Psychiatric/Behavioral:  Positive for depression.         PMHx:  Past Medical History:   Diagnosis Date    Acute bacterial pericarditis 2017    Anemia     Bacteremia due to methicillin resistant Staphylococcus aureus 2017    Diabetes mellitus     Encounter for blood transfusion     GERD (gastroesophageal reflux disease)     Heart murmur     History of pneumonia     History of UTI     Hypertension     Migraine headache     Sleep apnea     no machine yet    Type 2 diabetes mellitus without complication, with long-term current use of insulin 2017       PSHx:  Past Surgical History:   Procedure Laterality Date    APPENDECTOMY      CATHETERIZATION OF BOTH LEFT AND RIGHT HEART N/A 2023    Procedure: Right and Left Heart Cath;  Surgeon: Osman Williamson MD;  Location: [facility] CATH;  Service: Cardiology;  Laterality: N/A;     SECTION      CHOLECYSTECTOMY      COLONOSCOPY N/A 2017    Procedure: COLONOSCOPY;  Surgeon: Juan Lepe MD;  Location: [facility] ENDO;  Service: Endoscopy;  Laterality: N/A;    ESOPHAGOGASTRODUODENOSCOPY N/A 2019    Procedure: EGD (ESOPHAGOGASTRODUODENOSCOPY);  Surgeon: Gustavo Austin MD;  Location: [facility] ENDO;  Service: Endoscopy;  Laterality: N/A;  with Push Enteroscopy    ESOPHAGOGASTRODUODENOSCOPY N/A 2019    Procedure: EGD (ESOPHAGOGASTRODUODENOSCOPY);  Surgeon: Gustavo Austin MD;  Location: [facility];  Service: Endoscopy;  Laterality: N/A;  Push enteroscopy    HYSTERECTOMY      IRRIGATION AND DEBRIDEMENT OF LUMBAR SPINE  2022    LUMBAR FUSION  2021    L4-L5       Fam Hx:   malignancies: No , gyn malignancies: No   kidney stones: No     Soc Hx:  Lives in [place]    Allergies:  Ciprofloxacin    Medications: reviewed     Objective:   There were no vitals filed for this visit.    Physical Exam  Constitutional:       Appearance: Normal appearance.   HENT:      Head: Normocephalic.      Mouth/Throat:      Mouth: Mucous membranes are dry.   Eyes:      Conjunctiva/sclera: Conjunctivae  normal.   Pulmonary:      Effort: Pulmonary effort is normal.   Abdominal:      General: There is no distension.      Palpations: Abdomen is soft.      Tenderness: There is no abdominal tenderness.   Musculoskeletal:      Cervical back: Normal range of motion.      Right lower leg: Edema present.      Left lower leg: Edema present.      Comments: In wheelchair   Neurological:      Mental Status: She is alert and oriented to person, place, and time.   Psychiatric:         Mood and Affect: Mood normal.         Behavior: Behavior normal.         LABS REVIEW:  UA today:  on abx    Assessment:       1. Urinary urgency    2. Dysuria    3. Acute cystitis without hematuria    4. Recurrent UTI    5. Stress incontinence          Plan:      Continue abx as prescribed  CT Urogram ordered and scheduled  Cystoscopy with pelvic exam ordered and scheduled for recurrent UTIs and stress incontinence  Can decrease oxybutynin to 5mg XL  For your recurrent UTIs:  Behavioral changes to help decrease UTI recurrences   -increase water intake (6 to 8 bottles water per day)   -don't hold urine, void every 2 to 3 hours   -prevent constipation (miralax capful daily if necessary) to have a bowel movement daily and keep stools soft  -cut down on caffeine,drink mainly water  -no douching   -void immediately after sexual intercourse  -wipe front to back     OTC meds to try (go to the pharmacist and ask where they are, they are over the counter)  -cranberry pills 500mg tabs TID, can buy over the counter  -take a probiotic daily designed for vaginal and urinary health  -D-Mannose once daily over the counter    IMPORTANT: If you feel like you have a UTI coming on, call our office and talk to one of the nurses. We will have you drop off a urine here in clinic or at one of our [facility] facilities. I do not typically like to write for antibiotics unless we have a urine culture. Avoid going to urgent care. We need to start documenting your urine cultures  here in our office to see if they are really recurrent UTIs or sx of UTIs.     If you have fever and it doesn't improve with tylenol or ibuprofen or if you have flank pain associated with the uti symptoms go to the ER.       F/u As Needed per Treatment Plan    [facility]: Active    Sarah Davis, LUIS MIGUEL-C

## 2023-12-29 DIAGNOSIS — Z79.4 TYPE 2 DIABETES MELLITUS WITH DIABETIC POLYNEUROPATHY, WITH LONG-TERM CURRENT USE OF INSULIN: ICD-10-CM

## 2023-12-29 DIAGNOSIS — E11.42 TYPE 2 DIABETES MELLITUS WITH DIABETIC POLYNEUROPATHY, WITH LONG-TERM CURRENT USE OF INSULIN: ICD-10-CM

## 2023-12-29 RX ORDER — METFORMIN HYDROCHLORIDE 1000 MG/1
1000 TABLET ORAL 2 TIMES DAILY
Qty: 180 TABLET | Refills: 1 | Status: SHIPPED | OUTPATIENT
Start: 2023-12-29 | End: 2024-04-02 | Stop reason: SDUPTHER

## 2024-01-05 ENCOUNTER — PATIENT MESSAGE (OUTPATIENT)
Dept: PRIMARY CARE CLINIC | Facility: CLINIC | Age: 64
End: 2024-01-05
Payer: MEDICARE

## 2024-01-12 ENCOUNTER — HOSPITAL ENCOUNTER (OUTPATIENT)
Dept: RADIOLOGY | Facility: HOSPITAL | Age: 64
Discharge: HOME | End: 2024-01-12
Payer: MEDICARE

## 2024-01-12 DIAGNOSIS — R30.0 DYSURIA: ICD-10-CM

## 2024-01-12 DIAGNOSIS — N39.0 RECURRENT UTI: ICD-10-CM

## 2024-01-12 DIAGNOSIS — N30.00 ACUTE CYSTITIS WITHOUT HEMATURIA: ICD-10-CM

## 2024-01-12 PROCEDURE — 74178 CT ABD&PLV WO CNTR FLWD CNTR: CPT | Mod: TC,PO

## 2024-01-12 PROCEDURE — 74178 CT ABD&PLV WO CNTR FLWD CNTR: CPT | Mod: 26,,, | Performed by: RADIOLOGY

## 2024-01-12 PROCEDURE — 25500020 PHARM REV CODE 255: Mod: PO

## 2024-01-12 RX ADMIN — IOHEXOL 125 ML: 350 INJECTION, SOLUTION INTRAVENOUS at 01:01

## 2024-01-17 ENCOUNTER — TELEPHONE (OUTPATIENT)
Dept: ENDOCRINOLOGY | Facility: CLINIC | Age: 64
End: 2024-01-17
Payer: MEDICARE

## 2024-01-17 NOTE — TELEPHONE ENCOUNTER
S/w pt state has appt w/you on 1/25 but can't make it. Next available APril.Pt stats she can't wait that long her BG all over above 400, tried to download dexcom but it says last uploaded on 12/25. Resent her a new code to share.still not working. Notified her to call Dexcom to find out what is going on. Also told her to find her  and see it sh can use that so we can have her come for N.V. Pt would like to know if we can squeezed her in ASAP

## 2024-01-17 NOTE — TELEPHONE ENCOUNTER
----- Message from Brandt Desmond sent at 1/17/2024  2:07 PM CST -----  Regarding: advice  Contact: Patient  Type: Needs Medical Advice  Who Called:  Patient   Symptoms (please be specific):    How long has patient had these symptoms:    Pharmacy name and phone #:    Best Call Back Number: [phone]  Additional Information: Pt needs to reschedule her appt on 01/25 didn't want what was available and requesting a call back . Thanks

## 2024-01-18 ENCOUNTER — TELEPHONE (OUTPATIENT)
Dept: ENDOCRINOLOGY | Facility: CLINIC | Age: 64
End: 2024-01-18
Payer: MEDICARE

## 2024-01-18 NOTE — TELEPHONE ENCOUNTER
----- Message from Loren Sandhu sent at 1/18/2024 11:31 AM CST -----  Contact: Patient  Type:  Patient Returning Call    Who Called:Patient    Who Left Message for Patient:Sha    Does the patient know what this is regarding?:No    Would the patient rather a call back or a response via [employer] [facility]? Call    Best Call Back Number:[phone] ([place])     Additional Information: Please call to advise

## 2024-01-18 NOTE — TELEPHONE ENCOUNTER
Spoke to pt's daughter and adv pt is unable to do virtual d/t unable to share dexcom. Set up appt with education to see if they can help pt get this fixed since unable to get fixed through [employer] tech support.

## 2024-01-18 NOTE — TELEPHONE ENCOUNTER
----- Message from Jesse Mccall sent at 1/18/2024 11:19 AM CST -----  Contact: pt  Type: Needs Medical Advice  Who Called: pt  Best Call Back Number: [phone]    Additional Information: Pt is calling the office is having car trouble asking for a virtual appt or needs to schedule a sooner appt.Please call back and advise.

## 2024-01-19 ENCOUNTER — TELEPHONE (OUTPATIENT)
Dept: UROLOGY | Facility: CLINIC | Age: 64
End: 2024-01-19
Payer: MEDICARE

## 2024-01-19 NOTE — TELEPHONE ENCOUNTER
----- Message from Karmen Kohler sent at 1/19/2024  7:04 AM CST -----  Contact: self  Pt has a cystoscopy jasson for this at 8 AM and she is having car issues and will not be able to make it. Call back to audie at [phone] and thanks

## 2024-02-01 ENCOUNTER — PATIENT MESSAGE (OUTPATIENT)
Dept: PRIMARY CARE CLINIC | Facility: CLINIC | Age: 64
End: 2024-02-01
Payer: MEDICARE

## 2024-02-01 RX ORDER — TIZANIDINE 4 MG/1
4 TABLET ORAL 3 TIMES DAILY
Qty: 90 TABLET | Refills: 0 | Status: SHIPPED | OUTPATIENT
Start: 2024-02-01 | End: 2024-03-04

## 2024-02-14 ENCOUNTER — TELEPHONE (OUTPATIENT)
Dept: ENDOCRINOLOGY | Facility: CLINIC | Age: 64
End: 2024-02-14
Payer: MEDICARE

## 2024-02-14 NOTE — TELEPHONE ENCOUNTER
S/Jose Manuel @ [facility]'s pharmacy. She said that Novolog needs an audit,and that she will send someone over here to [facility] to have Bhavana initial and sign

## 2024-02-14 NOTE — TELEPHONE ENCOUNTER
----- Message from Norma Brown sent at 2/14/2024 10:59 AM CST -----  Regarding: clarification  Contact: Naomie with [facility]  Type:  Pharmacy Calling to Clarify an RX    Name of Caller:  Naomie  Pharmacy Name:  [facility]  Prescription Name:  insulin  What do they need to clarify?:  e script  Best Call Back Number:  [phone]  Additional Information:  Please call Naomie to clarify.  Thanks!

## 2024-02-23 ENCOUNTER — PATIENT MESSAGE (OUTPATIENT)
Dept: PSYCHIATRY | Facility: CLINIC | Age: 64
End: 2024-02-23
Payer: MEDICARE

## 2024-02-26 ENCOUNTER — TELEPHONE (OUTPATIENT)
Dept: ENDOCRINOLOGY | Facility: CLINIC | Age: 64
End: 2024-02-26
Payer: MEDICARE

## 2024-02-26 NOTE — TELEPHONE ENCOUNTER
Spoke to Renee chávez [facility] and adv her she will need to fax us something stating what she needs and we will forward to the provider. Adv we do not have office stamp.

## 2024-02-26 NOTE — TELEPHONE ENCOUNTER
----- Message from Jaylene Salazar sent at 2/26/2024  2:43 PM CST -----  Regarding: Paperwork Corrections  [facility].   States the paperwork is filled out correctly but they need a stamp or letterhead showing that it was filled out by the clinic and not the pharmacy.    Can someone please contact Renee @ [phone].

## 2024-02-28 ENCOUNTER — TELEPHONE (OUTPATIENT)
Dept: UROLOGY | Facility: CLINIC | Age: 64
End: 2024-02-28
Payer: MEDICARE

## 2024-02-28 ENCOUNTER — PATIENT MESSAGE (OUTPATIENT)
Dept: PRIMARY CARE CLINIC | Facility: CLINIC | Age: 64
End: 2024-02-28
Payer: MEDICARE

## 2024-02-28 DIAGNOSIS — E11.29 TYPE 2 DIABETES MELLITUS WITH MICROALBUMINURIA, WITH LONG-TERM CURRENT USE OF INSULIN: ICD-10-CM

## 2024-02-28 DIAGNOSIS — R80.9 TYPE 2 DIABETES MELLITUS WITH MICROALBUMINURIA, WITH LONG-TERM CURRENT USE OF INSULIN: ICD-10-CM

## 2024-02-28 DIAGNOSIS — Z79.4 TYPE 2 DIABETES MELLITUS WITH MICROALBUMINURIA, WITH LONG-TERM CURRENT USE OF INSULIN: ICD-10-CM

## 2024-02-28 NOTE — TELEPHONE ENCOUNTER
Spoke with pt daughter and she is asking if you could refill Ms. Mick Farnsworth. Would like the refill to go to [facility] in [place].    Med pend      The pt also have a compliant of a UTI. Appt was scheduled for tomorrow with you.

## 2024-02-28 NOTE — TELEPHONE ENCOUNTER
----- Message from Blanche Poon sent at 2/28/2024  2:49 PM CST -----  Contact: Self  Pt is calling back in regards to the Cystoscopy she missed stated she was having car troubles and needs to go ahead and have this procedure rescheduled. Can we please call pt back and reschedule at [phone]. Thank You.

## 2024-02-29 ENCOUNTER — CLINICAL SUPPORT (OUTPATIENT)
Dept: PRIMARY CARE CLINIC | Facility: CLINIC | Age: 64
End: 2024-02-29
Payer: MEDICARE

## 2024-02-29 DIAGNOSIS — N39.0 URINARY TRACT INFECTION WITHOUT HEMATURIA, SITE UNSPECIFIED: Primary | ICD-10-CM

## 2024-02-29 LAB
BILIRUBIN, UA POC OHS: NEGATIVE
BLOOD, UA POC OHS: ABNORMAL
CLARITY, UA POC OHS: ABNORMAL
COLOR, UA POC OHS: YELLOW
GLUCOSE, UA POC OHS: NEGATIVE
KETONES, UA POC OHS: NEGATIVE
LEUKOCYTES, UA POC OHS: ABNORMAL
NITRITE, UA POC OHS: NEGATIVE
PH, UA POC OHS: 6.5
PROTEIN, UA POC OHS: NEGATIVE
SPECIFIC GRAVITY, UA POC OHS: 1.02
UROBILINOGEN, UA POC OHS: 1

## 2024-02-29 PROCEDURE — 87088 URINE BACTERIA CULTURE: CPT | Performed by: PHYSICIAN ASSISTANT

## 2024-02-29 PROCEDURE — 87186 SC STD MICRODIL/AGAR DIL: CPT | Performed by: PHYSICIAN ASSISTANT

## 2024-02-29 PROCEDURE — 87086 URINE CULTURE/COLONY COUNT: CPT | Performed by: PHYSICIAN ASSISTANT

## 2024-02-29 PROCEDURE — 87077 CULTURE AEROBIC IDENTIFY: CPT | Performed by: PHYSICIAN ASSISTANT

## 2024-02-29 PROCEDURE — 81003 URINALYSIS AUTO W/O SCOPE: CPT | Mod: QW,S$GLB,, | Performed by: PHYSICIAN ASSISTANT

## 2024-02-29 RX ORDER — AMOXICILLIN AND CLAVULANATE POTASSIUM 500; 125 MG/1; MG/1
1 TABLET, FILM COATED ORAL 2 TIMES DAILY
Qty: 20 TABLET | Refills: 0 | Status: SHIPPED | OUTPATIENT
Start: 2024-02-29 | End: 2024-03-04

## 2024-02-29 RX ORDER — TIRZEPATIDE 5 MG/.5ML
5 INJECTION, SOLUTION SUBCUTANEOUS
Qty: 4 PEN | Refills: 3 | Status: SHIPPED | OUTPATIENT
Start: 2024-02-29 | End: 2024-03-28

## 2024-02-29 NOTE — PROGRESS NOTES
HUB to share    Sara Nieves, APRN   mychart msg sent     Urine collected in clinic

## 2024-03-02 ENCOUNTER — PATIENT MESSAGE (OUTPATIENT)
Dept: PRIMARY CARE CLINIC | Facility: CLINIC | Age: 64
End: 2024-03-02
Payer: MEDICARE

## 2024-03-02 LAB — BACTERIA UR CULT: ABNORMAL

## 2024-03-04 ENCOUNTER — TELEPHONE (OUTPATIENT)
Dept: PRIMARY CARE CLINIC | Facility: CLINIC | Age: 64
End: 2024-03-04
Payer: MEDICARE

## 2024-03-04 RX ORDER — TEMAZEPAM 30 MG/1
30 CAPSULE ORAL NIGHTLY PRN
Qty: 30 CAPSULE | Refills: 2 | Status: SHIPPED | OUTPATIENT
Start: 2024-03-04 | End: 2024-04-02 | Stop reason: SDUPTHER

## 2024-03-04 RX ORDER — CEFDINIR 300 MG/1
300 CAPSULE ORAL 2 TIMES DAILY
Qty: 20 CAPSULE | Refills: 0 | Status: SHIPPED | OUTPATIENT
Start: 2024-03-04 | End: 2024-04-02

## 2024-03-04 RX ORDER — TIZANIDINE 4 MG/1
4 TABLET ORAL 3 TIMES DAILY
Qty: 90 TABLET | Refills: 0 | Status: SHIPPED | OUTPATIENT
Start: 2024-03-04 | End: 2024-04-02 | Stop reason: SDUPTHER

## 2024-03-04 NOTE — TELEPHONE ENCOUNTER
Handled in another encounter. Pt aware of lab results sent in the pharmacy   written material/verbal instruction

## 2024-03-04 NOTE — TELEPHONE ENCOUNTER
----- Message from Fer Herring III, PA-C sent at 3/4/2024  6:52 AM CST -----  Ms Amanda,    The urine culture shows the Augmentin may not be effective. I have sent in another medication to your pharmacy.

## 2024-03-04 NOTE — TELEPHONE ENCOUNTER
----- Message from Fer Herring III, PA-C sent at 2/29/2024  4:37 PM CST -----  UA shows early infection. I sent medication to [facility]

## 2024-03-06 DIAGNOSIS — R05.3 CHRONIC COUGH: ICD-10-CM

## 2024-03-06 DIAGNOSIS — R11.0 NAUSEA: ICD-10-CM

## 2024-03-06 DIAGNOSIS — K21.9 GASTROESOPHAGEAL REFLUX DISEASE WITHOUT ESOPHAGITIS: ICD-10-CM

## 2024-03-06 RX ORDER — TIZANIDINE 4 MG/1
TABLET ORAL
Qty: 90 TABLET | Refills: 0 | OUTPATIENT
Start: 2024-03-06

## 2024-03-06 RX ORDER — IBUPROFEN 800 MG/1
TABLET ORAL
Qty: 270 TABLET | Refills: 0 | OUTPATIENT
Start: 2024-03-06

## 2024-03-06 RX ORDER — PROMETHAZINE HYDROCHLORIDE 25 MG/1
25 TABLET ORAL
Qty: 30 TABLET | Refills: 0 | OUTPATIENT
Start: 2024-03-06

## 2024-03-06 RX ORDER — PANTOPRAZOLE SODIUM 40 MG/1
40 TABLET, DELAYED RELEASE ORAL DAILY
Qty: 30 TABLET | Refills: 0 | OUTPATIENT
Start: 2024-03-06 | End: 2025-03-06

## 2024-03-07 RX ORDER — PROMETHAZINE HYDROCHLORIDE 25 MG/1
25 TABLET ORAL
Qty: 30 TABLET | Refills: 1 | Status: SHIPPED | OUTPATIENT
Start: 2024-03-07

## 2024-03-07 RX ORDER — IBUPROFEN 800 MG/1
800 TABLET ORAL 3 TIMES DAILY PRN
Qty: 90 TABLET | Refills: 1 | Status: SHIPPED | OUTPATIENT
Start: 2024-03-07 | End: 2024-04-02 | Stop reason: SDUPTHER

## 2024-03-13 ENCOUNTER — PATIENT MESSAGE (OUTPATIENT)
Dept: PRIMARY CARE CLINIC | Facility: CLINIC | Age: 64
End: 2024-03-13
Payer: MEDICARE

## 2024-03-18 ENCOUNTER — PATIENT MESSAGE (OUTPATIENT)
Dept: PRIMARY CARE CLINIC | Facility: CLINIC | Age: 64
End: 2024-03-18
Payer: MEDICARE

## 2024-03-19 ENCOUNTER — OFFICE VISIT (OUTPATIENT)
Dept: PRIMARY CARE CLINIC | Facility: CLINIC | Age: 64
End: 2024-03-19
Payer: MEDICARE

## 2024-03-19 ENCOUNTER — PATIENT MESSAGE (OUTPATIENT)
Dept: PRIMARY CARE CLINIC | Facility: CLINIC | Age: 64
End: 2024-03-19

## 2024-03-19 VITALS
HEART RATE: 82 BPM | HEIGHT: 61 IN | BODY MASS INDEX: 41.78 KG/M2 | OXYGEN SATURATION: 98 % | WEIGHT: 221.31 LBS | SYSTOLIC BLOOD PRESSURE: 98 MMHG | DIASTOLIC BLOOD PRESSURE: 54 MMHG

## 2024-03-19 DIAGNOSIS — E66.01 CLASS 2 SEVERE OBESITY WITH SERIOUS COMORBIDITY AND BODY MASS INDEX (BMI) OF 38.0 TO 38.9 IN ADULT, UNSPECIFIED OBESITY TYPE: ICD-10-CM

## 2024-03-19 DIAGNOSIS — R55 NEAR SYNCOPE: Primary | ICD-10-CM

## 2024-03-19 DIAGNOSIS — I95.9 HYPOTENSION, UNSPECIFIED HYPOTENSION TYPE: ICD-10-CM

## 2024-03-19 DIAGNOSIS — R05.9 COUGH, UNSPECIFIED TYPE: ICD-10-CM

## 2024-03-19 DIAGNOSIS — M54.31 SCIATICA OF RIGHT SIDE: ICD-10-CM

## 2024-03-19 DIAGNOSIS — E11.29 TYPE 2 DIABETES MELLITUS WITH MICROALBUMINURIA, WITH LONG-TERM CURRENT USE OF INSULIN: ICD-10-CM

## 2024-03-19 DIAGNOSIS — I48.0 PAROXYSMAL ATRIAL FIBRILLATION: ICD-10-CM

## 2024-03-19 DIAGNOSIS — R80.9 TYPE 2 DIABETES MELLITUS WITH MICROALBUMINURIA, WITH LONG-TERM CURRENT USE OF INSULIN: ICD-10-CM

## 2024-03-19 DIAGNOSIS — Z79.4 TYPE 2 DIABETES MELLITUS WITH MICROALBUMINURIA, WITH LONG-TERM CURRENT USE OF INSULIN: ICD-10-CM

## 2024-03-19 DIAGNOSIS — I73.9 PERIPHERAL VASCULAR DISEASE, UNSPECIFIED: ICD-10-CM

## 2024-03-19 DIAGNOSIS — I50.32 CHRONIC DIASTOLIC HEART FAILURE: ICD-10-CM

## 2024-03-19 DIAGNOSIS — J44.9 CHRONIC OBSTRUCTIVE PULMONARY DISEASE, UNSPECIFIED COPD TYPE: ICD-10-CM

## 2024-03-19 PROBLEM — E66.812 CLASS 2 SEVERE OBESITY WITH SERIOUS COMORBIDITY AND BODY MASS INDEX (BMI) OF 38.0 TO 38.9 IN ADULT, UNSPECIFIED OBESITY TYPE: Status: ACTIVE | Noted: 2019-02-24

## 2024-03-19 PROCEDURE — 3008F BODY MASS INDEX DOCD: CPT | Mod: CPTII,S$GLB,, | Performed by: PHYSICIAN ASSISTANT

## 2024-03-19 PROCEDURE — 4010F ACE/ARB THERAPY RXD/TAKEN: CPT | Mod: CPTII,S$GLB,, | Performed by: PHYSICIAN ASSISTANT

## 2024-03-19 PROCEDURE — 99214 OFFICE O/P EST MOD 30 MIN: CPT | Mod: S$GLB,,, | Performed by: PHYSICIAN ASSISTANT

## 2024-03-19 PROCEDURE — 1159F MED LIST DOCD IN RCRD: CPT | Mod: CPTII,S$GLB,, | Performed by: PHYSICIAN ASSISTANT

## 2024-03-19 PROCEDURE — 3078F DIAST BP <80 MM HG: CPT | Mod: CPTII,S$GLB,, | Performed by: PHYSICIAN ASSISTANT

## 2024-03-19 PROCEDURE — 3074F SYST BP LT 130 MM HG: CPT | Mod: CPTII,S$GLB,, | Performed by: PHYSICIAN ASSISTANT

## 2024-03-19 RX ORDER — BUDESONIDE AND FORMOTEROL FUMARATE DIHYDRATE 80; 4.5 UG/1; UG/1
2 AEROSOL RESPIRATORY (INHALATION) 2 TIMES DAILY
Qty: 10.2 G | Refills: 11 | Status: SHIPPED | OUTPATIENT
Start: 2024-03-19 | End: 2024-04-02 | Stop reason: SDUPTHER

## 2024-03-19 RX ORDER — ALBUTEROL SULFATE 90 UG/1
2 AEROSOL, METERED RESPIRATORY (INHALATION) EVERY 6 HOURS PRN
Qty: 6.7 G | Refills: 11 | Status: SHIPPED | OUTPATIENT
Start: 2024-03-19 | End: 2024-04-02 | Stop reason: SDUPTHER

## 2024-03-19 NOTE — PROGRESS NOTES
Subjective     Patient ID: Amanda Barton is a 63 y.o. female.    Chief Complaint: Fatigue, Shortness of Breath (Lips have been turning blue ), and Diabetes    Ms. Patel is a 63 year old female here today for fatigue and cyanosis of lips for weeks. She reports she has been sleeping more and feels dizzy when she lays down and first sits up. Her BP today is 98/54. Patient wears a CPAP at night. Denies peripheral cyanosis, headaches, chest pain, shortness of breath. She reports her blood sugars have been fluctuating, but she is being followed by an endocrinologist. Also she is seeing a urologist for recurrent UTIs and is scheduled for a cytoscope. She uses her Symbicort inhaler daily, but does not have a rescue inhaler.  Her recent eye exam was done in February at [facility].     Fatigue  This is a new problem. The current episode started 1 to 4 weeks ago. The problem occurs constantly. The problem has been waxing and waning. Associated symptoms include urinary symptoms, vertigo and weakness. Pertinent negatives include no abdominal pain, chest pain, coughing, fever, headaches or nausea.   Review of Systems   Constitutional:  Positive for activity change. Negative for fever.   HENT:  Negative for hearing loss and trouble swallowing.    Eyes: Negative.    Respiratory:  Negative for cough and shortness of breath.    Cardiovascular:  Negative for chest pain and leg swelling.   Gastrointestinal:  Negative for abdominal pain and nausea.   Endocrine: Negative.    Genitourinary:  Positive for bladder incontinence.   Musculoskeletal: Negative.    Integumentary:  Negative.   Allergic/Immunologic: Negative.    Neurological:  Positive for dizziness, vertigo, weakness and light-headedness. Negative for headaches.   Psychiatric/Behavioral: Negative.       Past Medical History:   Diagnosis Date    Acute bacterial pericarditis 12/02/2017    Anemia     Bacteremia due to methicillin resistant Staphylococcus aureus  11/20/2017    Diabetes mellitus     Encounter for blood transfusion     GERD (gastroesophageal reflux disease)     Heart murmur     History of pneumonia     History of UTI     Hypertension     Migraine headache     Sleep apnea     no machine yet    Type 2 diabetes mellitus without complication, with long-term current use of insulin 11/19/2017           Objective     Physical Exam  Vitals reviewed.   Constitutional:       General: She is not in acute distress.     Appearance: She is ill-appearing. She is not toxic-appearing or diaphoretic.   Cardiovascular:      Rate and Rhythm: Normal rate and regular rhythm.      Pulses: Decreased pulses.      Heart sounds: Normal heart sounds. No murmur heard.     No friction rub. No gallop.   Pulmonary:      Effort: No respiratory distress.      Breath sounds: Normal breath sounds. No stridor. No wheezing, rhonchi or rales.   Chest:      Chest wall: No tenderness.   Abdominal:      General: Bowel sounds are normal.      Palpations: Abdomen is soft.      Tenderness: There is no abdominal tenderness.      Hernia: A hernia is present. Hernia is present in the umbilical area.   Musculoskeletal:      Right lower leg: No edema.      Left lower leg: No edema.      Right foot: Normal range of motion. No deformity, bunion, Charcot foot, foot drop or prominent metatarsal heads.      Left foot: Normal range of motion. No deformity, bunion, Charcot foot, foot drop or prominent metatarsal heads.   Feet:      Right foot:      Protective Sensation: 9 sites tested.  9 sites sensed.      Left foot:      Protective Sensation: 9 sites tested.  9 sites sensed.   Skin:     General: Skin is dry.   Neurological:      Mental Status: She is lethargic.   Psychiatric:         Behavior: Behavior is withdrawn.        Assessment and Plan     Near syncope  - Advised patient to go to ER     Hypotension, unspecified hypotension type  - BP was 98/54 today  - Advised patient to go to ER    Peripheral vascular  disease, unspecified  Stable and controlled. Continue current treatment plan as previously prescribed with your PCP.      Type 2 diabetes mellitus with microalbuminuria, with long-term current use of insulin  - Followed by Endocrinology  - Up to date on Diabetic foot exam and eye exam     Chronic obstructive pulmonary disease, unspecified COPD type  -     WHEELCHAIR FOR HOME USE    Paroxysmal atrial fibrillation  - Not heard on exam today     Chronic diastolic heart failure  -     WHEELCHAIR FOR HOME USE    Class 2 severe obesity with serious comorbidity and body mass index (BMI) of 38.0 to 38.9 in adult, unspecified obesity type  The patient is asked to make an attempt to improve diet and exercise patterns to aid in medical management of this problem.     Cough, unspecified type  -     SYMBICORT 80-4.5 mcg/actuation HFAA; Inhale 2 puffs into the lungs 2 (two) times a day.  Dispense: 10.2 g; Refill: 11    Sciatica of right side  -     WHEELCHAIR FOR HOME USE    Other orders  -     albuterol (VENTOLIN HFA) 90 mcg/actuation inhaler; Inhale 2 puffs into the lungs every 6 (six) hours as needed for Wheezing (Rescue Inhaler). Rescue  Dispense: 6.7 g; Refill: 11            I spent 30 minutes on this encounter, time includes face-to-face, chart review, documentation, test review and orders.

## 2024-03-28 DIAGNOSIS — R80.9 TYPE 2 DIABETES MELLITUS WITH MICROALBUMINURIA, WITH LONG-TERM CURRENT USE OF INSULIN: ICD-10-CM

## 2024-03-28 DIAGNOSIS — Z79.4 TYPE 2 DIABETES MELLITUS WITH MICROALBUMINURIA, WITH LONG-TERM CURRENT USE OF INSULIN: ICD-10-CM

## 2024-03-28 DIAGNOSIS — E11.29 TYPE 2 DIABETES MELLITUS WITH MICROALBUMINURIA, WITH LONG-TERM CURRENT USE OF INSULIN: ICD-10-CM

## 2024-03-28 RX ORDER — TIRZEPATIDE 5 MG/.5ML
5 INJECTION, SOLUTION SUBCUTANEOUS
Qty: 4 PEN | Refills: 5 | Status: SHIPPED | OUTPATIENT
Start: 2024-03-28 | End: 2024-04-02

## 2024-04-02 ENCOUNTER — OFFICE VISIT (OUTPATIENT)
Dept: PRIMARY CARE CLINIC | Facility: CLINIC | Age: 64
End: 2024-04-02
Payer: MEDICARE

## 2024-04-02 VITALS
HEART RATE: 79 BPM | BODY MASS INDEX: 41.45 KG/M2 | SYSTOLIC BLOOD PRESSURE: 121 MMHG | DIASTOLIC BLOOD PRESSURE: 62 MMHG | RESPIRATION RATE: 18 BRPM | OXYGEN SATURATION: 95 % | WEIGHT: 219.38 LBS

## 2024-04-02 DIAGNOSIS — R39.15 URINARY URGENCY: ICD-10-CM

## 2024-04-02 DIAGNOSIS — E11.42 TYPE 2 DIABETES MELLITUS WITH DIABETIC POLYNEUROPATHY, WITH LONG-TERM CURRENT USE OF INSULIN: ICD-10-CM

## 2024-04-02 DIAGNOSIS — R41.3 OTHER AMNESIA: ICD-10-CM

## 2024-04-02 DIAGNOSIS — J30.2 SEASONAL ALLERGIES: ICD-10-CM

## 2024-04-02 DIAGNOSIS — K21.9 GASTROESOPHAGEAL REFLUX DISEASE WITHOUT ESOPHAGITIS: ICD-10-CM

## 2024-04-02 DIAGNOSIS — Z79.4 TYPE 2 DIABETES MELLITUS WITH DIABETIC POLYNEUROPATHY, WITH LONG-TERM CURRENT USE OF INSULIN: ICD-10-CM

## 2024-04-02 DIAGNOSIS — R41.3 MEMORY LOSS OF UNKNOWN CAUSE: Primary | ICD-10-CM

## 2024-04-02 DIAGNOSIS — R05.3 CHRONIC COUGH: ICD-10-CM

## 2024-04-02 DIAGNOSIS — F32.A DEPRESSION, UNSPECIFIED DEPRESSION TYPE: ICD-10-CM

## 2024-04-02 DIAGNOSIS — I50.32 CHRONIC DIASTOLIC HEART FAILURE: ICD-10-CM

## 2024-04-02 DIAGNOSIS — G25.81 RESTLESS LEG SYNDROME: ICD-10-CM

## 2024-04-02 DIAGNOSIS — M47.816 OSTEOARTHRITIS OF LUMBAR SPINE, UNSPECIFIED SPINAL OSTEOARTHRITIS COMPLICATION STATUS: ICD-10-CM

## 2024-04-02 DIAGNOSIS — F41.0 PANIC DISORDER: ICD-10-CM

## 2024-04-02 DIAGNOSIS — Z79.4 TYPE 2 DIABETES MELLITUS WITH HYPERGLYCEMIA, WITH LONG-TERM CURRENT USE OF INSULIN: ICD-10-CM

## 2024-04-02 DIAGNOSIS — E11.65 TYPE 2 DIABETES MELLITUS WITH HYPERGLYCEMIA, WITH LONG-TERM CURRENT USE OF INSULIN: ICD-10-CM

## 2024-04-02 DIAGNOSIS — Z00.00 WELLNESS EXAMINATION: ICD-10-CM

## 2024-04-02 DIAGNOSIS — R05.9 COUGH, UNSPECIFIED TYPE: ICD-10-CM

## 2024-04-02 DIAGNOSIS — E11.69 DM TYPE 2 WITH DIABETIC MIXED HYPERLIPIDEMIA: ICD-10-CM

## 2024-04-02 DIAGNOSIS — I51.89 DIASTOLIC DYSFUNCTION: ICD-10-CM

## 2024-04-02 DIAGNOSIS — E78.2 DM TYPE 2 WITH DIABETIC MIXED HYPERLIPIDEMIA: ICD-10-CM

## 2024-04-02 LAB
ALBUMIN SERPL BCP-MCNC: 3.1 G/DL (ref 3.5–5.2)
ALP SERPL-CCNC: 138 U/L (ref 55–135)
ALT SERPL W/O P-5'-P-CCNC: 10 U/L (ref 10–44)
ANION GAP SERPL CALC-SCNC: 12 MMOL/L (ref 8–16)
AST SERPL-CCNC: 16 U/L (ref 10–40)
BILIRUB SERPL-MCNC: 0.4 MG/DL (ref 0.1–1)
BUN SERPL-MCNC: 10 MG/DL (ref 8–23)
CALCIUM SERPL-MCNC: 8.6 MG/DL (ref 8.7–10.5)
CHLORIDE SERPL-SCNC: 101 MMOL/L (ref 95–110)
CO2 SERPL-SCNC: 28 MMOL/L (ref 23–29)
CREAT SERPL-MCNC: 1.1 MG/DL (ref 0.5–1.4)
EST. GFR  (NO RACE VARIABLE): 56.1 ML/MIN/1.73 M^2
GLUCOSE SERPL-MCNC: 220 MG/DL (ref 70–110)
HCV AB SERPL QL IA: NORMAL
HIV 1+2 AB+HIV1 P24 AG SERPL QL IA: NORMAL
POTASSIUM SERPL-SCNC: 3 MMOL/L (ref 3.5–5.1)
PROT SERPL-MCNC: 6.6 G/DL (ref 6–8.4)
SODIUM SERPL-SCNC: 141 MMOL/L (ref 136–145)
T3FREE SERPL-MCNC: 2.8 PG/ML (ref 2.3–4.2)
T4 FREE SERPL-MCNC: 1.07 NG/DL (ref 0.71–1.51)
TSH SERPL DL<=0.005 MIU/L-ACNC: 1.09 UIU/ML (ref 0.4–4)
VIT B12 SERPL-MCNC: 421 PG/ML (ref 210–950)

## 2024-04-02 PROCEDURE — 3074F SYST BP LT 130 MM HG: CPT | Mod: CPTII,S$GLB,, | Performed by: PHYSICIAN ASSISTANT

## 2024-04-02 PROCEDURE — 82607 VITAMIN B-12: CPT | Performed by: PHYSICIAN ASSISTANT

## 2024-04-02 PROCEDURE — 87389 HIV-1 AG W/HIV-1&-2 AB AG IA: CPT | Performed by: PHYSICIAN ASSISTANT

## 2024-04-02 PROCEDURE — 86803 HEPATITIS C AB TEST: CPT | Performed by: PHYSICIAN ASSISTANT

## 2024-04-02 PROCEDURE — 80053 COMPREHEN METABOLIC PANEL: CPT | Performed by: PHYSICIAN ASSISTANT

## 2024-04-02 PROCEDURE — 3008F BODY MASS INDEX DOCD: CPT | Mod: CPTII,S$GLB,, | Performed by: PHYSICIAN ASSISTANT

## 2024-04-02 PROCEDURE — 36415 COLL VENOUS BLD VENIPUNCTURE: CPT | Mod: S$GLB,,, | Performed by: INTERNAL MEDICINE

## 2024-04-02 PROCEDURE — 3078F DIAST BP <80 MM HG: CPT | Mod: CPTII,S$GLB,, | Performed by: PHYSICIAN ASSISTANT

## 2024-04-02 PROCEDURE — 4010F ACE/ARB THERAPY RXD/TAKEN: CPT | Mod: CPTII,S$GLB,, | Performed by: PHYSICIAN ASSISTANT

## 2024-04-02 PROCEDURE — 86592 SYPHILIS TEST NON-TREP QUAL: CPT | Performed by: PHYSICIAN ASSISTANT

## 2024-04-02 PROCEDURE — 84443 ASSAY THYROID STIM HORMONE: CPT | Performed by: PHYSICIAN ASSISTANT

## 2024-04-02 PROCEDURE — 84481 FREE ASSAY (FT-3): CPT | Performed by: PHYSICIAN ASSISTANT

## 2024-04-02 PROCEDURE — 99215 OFFICE O/P EST HI 40 MIN: CPT | Mod: S$GLB,,, | Performed by: PHYSICIAN ASSISTANT

## 2024-04-02 PROCEDURE — 84439 ASSAY OF FREE THYROXINE: CPT | Performed by: PHYSICIAN ASSISTANT

## 2024-04-02 RX ORDER — PREGABALIN 150 MG/1
150 CAPSULE ORAL 3 TIMES DAILY
Qty: 270 CAPSULE | Refills: 1 | Status: SHIPPED | OUTPATIENT
Start: 2024-04-02 | End: 2024-09-29

## 2024-04-02 RX ORDER — DULOXETIN HYDROCHLORIDE 60 MG/1
60 CAPSULE, DELAYED RELEASE ORAL 2 TIMES DAILY
Qty: 180 CAPSULE | Refills: 3 | Status: SHIPPED | OUTPATIENT
Start: 2024-04-02

## 2024-04-02 RX ORDER — FUROSEMIDE 40 MG/1
40 TABLET ORAL 2 TIMES DAILY
Qty: 180 TABLET | Refills: 1 | Status: SHIPPED | OUTPATIENT
Start: 2024-04-02

## 2024-04-02 RX ORDER — ALBUTEROL SULFATE 90 UG/1
2 AEROSOL, METERED RESPIRATORY (INHALATION) EVERY 6 HOURS PRN
Qty: 6.7 G | Refills: 11 | Status: SHIPPED | OUTPATIENT
Start: 2024-04-02

## 2024-04-02 RX ORDER — TEMAZEPAM 30 MG/1
30 CAPSULE ORAL NIGHTLY PRN
Qty: 30 CAPSULE | Refills: 2 | Status: SHIPPED | OUTPATIENT
Start: 2024-04-02 | End: 2024-05-24 | Stop reason: SDUPTHER

## 2024-04-02 RX ORDER — TIZANIDINE 4 MG/1
4 TABLET ORAL 3 TIMES DAILY
Qty: 90 TABLET | Refills: 3 | Status: SHIPPED | OUTPATIENT
Start: 2024-04-02

## 2024-04-02 RX ORDER — OXYBUTYNIN CHLORIDE 5 MG/1
5 TABLET, EXTENDED RELEASE ORAL DAILY
Qty: 90 TABLET | Refills: 3 | Status: SHIPPED | OUTPATIENT
Start: 2024-04-02 | End: 2025-04-02

## 2024-04-02 RX ORDER — PANTOPRAZOLE SODIUM 40 MG/1
40 TABLET, DELAYED RELEASE ORAL DAILY
Qty: 90 TABLET | Refills: 3 | Status: SHIPPED | OUTPATIENT
Start: 2024-04-02 | End: 2025-04-02

## 2024-04-02 RX ORDER — BUDESONIDE AND FORMOTEROL FUMARATE DIHYDRATE 80; 4.5 UG/1; UG/1
2 AEROSOL RESPIRATORY (INHALATION) 2 TIMES DAILY
Qty: 10.2 G | Refills: 11 | Status: SHIPPED | OUTPATIENT
Start: 2024-04-02 | End: 2024-06-19

## 2024-04-02 RX ORDER — IBUPROFEN 800 MG/1
800 TABLET ORAL 3 TIMES DAILY PRN
Qty: 90 TABLET | Refills: 1 | Status: SHIPPED | OUTPATIENT
Start: 2024-04-02

## 2024-04-02 RX ORDER — ALPRAZOLAM 1 MG/1
1 TABLET ORAL 2 TIMES DAILY
Qty: 60 TABLET | Refills: 2 | Status: SHIPPED | OUTPATIENT
Start: 2024-04-02 | End: 2024-05-03

## 2024-04-02 RX ORDER — ROSUVASTATIN CALCIUM 20 MG/1
20 TABLET, COATED ORAL NIGHTLY
Qty: 90 TABLET | Refills: 3 | Status: SHIPPED | OUTPATIENT
Start: 2024-04-02

## 2024-04-02 RX ORDER — DESLORATADINE 5 MG/1
5 TABLET ORAL DAILY
Qty: 30 TABLET | Refills: 11 | Status: SHIPPED | OUTPATIENT
Start: 2024-04-02 | End: 2025-04-02

## 2024-04-02 RX ORDER — ROPINIROLE 2 MG/1
2 TABLET, FILM COATED ORAL 3 TIMES DAILY
Qty: 270 TABLET | Refills: 3 | Status: SHIPPED | OUTPATIENT
Start: 2024-04-02

## 2024-04-02 RX ORDER — INSULIN GLARGINE 100 [IU]/ML
30 INJECTION, SOLUTION SUBCUTANEOUS NIGHTLY
Qty: 9 ML | Refills: 5 | Status: SHIPPED | OUTPATIENT
Start: 2024-04-02

## 2024-04-02 RX ORDER — INSULIN ASPART 100 [IU]/ML
INJECTION, SOLUTION INTRAVENOUS; SUBCUTANEOUS
Qty: 30 ML | Refills: 11 | Status: SHIPPED | OUTPATIENT
Start: 2024-04-02

## 2024-04-02 RX ORDER — LOSARTAN POTASSIUM 50 MG/1
50 TABLET ORAL DAILY
Qty: 90 TABLET | Refills: 3 | Status: SHIPPED | OUTPATIENT
Start: 2024-04-02

## 2024-04-02 RX ORDER — CARVEDILOL 3.12 MG/1
3.12 TABLET ORAL 2 TIMES DAILY WITH MEALS
Qty: 180 TABLET | Refills: 3 | Status: SHIPPED | OUTPATIENT
Start: 2024-04-02

## 2024-04-02 RX ORDER — SPIRONOLACTONE 50 MG/1
50 TABLET, FILM COATED ORAL DAILY
Qty: 360 TABLET | Refills: 0 | Status: SHIPPED | OUTPATIENT
Start: 2024-04-02 | End: 2025-04-02

## 2024-04-02 RX ORDER — METFORMIN HYDROCHLORIDE 1000 MG/1
1000 TABLET ORAL 2 TIMES DAILY
Qty: 180 TABLET | Refills: 1 | Status: SHIPPED | OUTPATIENT
Start: 2024-04-02

## 2024-04-02 RX ORDER — MONTELUKAST SODIUM 10 MG/1
10 TABLET ORAL NIGHTLY
Qty: 90 TABLET | Refills: 3 | Status: SHIPPED | OUTPATIENT
Start: 2024-04-02 | End: 2025-03-28

## 2024-04-02 RX ORDER — BUPROPION HYDROCHLORIDE 150 MG/1
150 TABLET ORAL DAILY
Qty: 90 TABLET | Refills: 0 | Status: SHIPPED | OUTPATIENT
Start: 2024-04-02 | End: 2025-04-02

## 2024-04-02 NOTE — PROGRESS NOTES
Subjective     Patient ID: Amanda Barton is a 63 y.o. female.    Chief Complaint: Follow-up    Patient is a 62 yo female who comes in with her daughter, Lisa. Lisa gives 90 % of the history.     Patient Active Problem List:     History of non-ST elevation myocardial infarction (NSTEMI)     BJ (obstructive sleep apnea)     Chronic diastolic heart failure: stable followed by Cardiology     Diastolic dysfunction     Paroxysmal atrial fibrillation     Pericarditis resolved     Cough     Sepsis     Febrile illness     Iron deficiency anemia: stable     Troponin level elevated     Class 2 severe obesity with serious comorbidity and body mass index (BMI) of 38.0 to 38.9 in adult, unspecified obesity type     Symptomatic anemia     Constipation     GI bleed     Type 2 diabetes mellitus with hyperglycemia, with long-term current use of insulin: desires to increase mounjaro     Type 2 diabetes mellitus with diabetic polyneuropathy, with long-term current use of insulin     Type 2 diabetes mellitus with microalbuminuria, with long-term current use of insulin     Chronic obstructive pulmonary disease, unspecified COPD type     Insomnia     Osteoarthritis of lumbar spine     Depression     DM type 2 with diabetic mixed hyperlipidemia     Gastroesophageal reflux disease without esophagitis     Urinary incontinence     Seasonal allergies     Panic disorder     Vitamin D deficiency: not taking supplementation      Nausea     Restless leg syndrome     Hypertension: controlled     Peripheral vascular disease, unspecified    Past Medical History:  12/02/2017: Acute bacterial pericarditis  No date: Anemia  11/20/2017: Bacteremia due to methicillin resistant Staphylococcus   aureus  No date: Diabetes mellitus  No date: Encounter for blood transfusion  No date: GERD (gastroesophageal reflux disease)  No date: Heart murmur  No date: History of pneumonia  No date: History of UTI  No date: Hypertension  No date: Migraine  headache  No date: Sleep apnea      Comment:  no machine yet  2017: Type 2 diabetes mellitus without complication, with long-  term current use of insulin    Past Surgical History:  No date: APPENDECTOMY  2023: CATHETERIZATION OF BOTH LEFT AND RIGHT HEART; N/A      Comment:  Procedure: Right and Left Heart Cath;  Surgeon: Osman Williamson MD;  Location: [facility] CATH;  Service: Cardiology;               Laterality: N/A;  No date:  SECTION  No date: CHOLECYSTECTOMY  2017: COLONOSCOPY; N/A      Comment:  Procedure: COLONOSCOPY;  Surgeon: Juan Lepe MD;  Location: [facility] ENDO;  Service: Endoscopy;                 Laterality: N/A;  2019: ESOPHAGOGASTRODUODENOSCOPY; N/A      Comment:  Procedure: EGD (ESOPHAGOGASTRODUODENOSCOPY);  Surgeon:                Gustavo Austin MD;  Location: [facility];  Service:                Endoscopy;  Laterality: N/A;  with Push Enteroscopy  2019: ESOPHAGOGASTRODUODENOSCOPY; N/A      Comment:  Procedure: EGD (ESOPHAGOGASTRODUODENOSCOPY);  Surgeon:                Gustavo Austin MD;  Location: [facility];  Service:                Endoscopy;  Laterality: N/A;  Push enteroscopy  No date: HYSTERECTOMY  2022: IRRIGATION AND DEBRIDEMENT OF LUMBAR SPINE  2021: LUMBAR FUSION      Comment:  L4-L5    Review of patient's family history indicates:  Problem: No Known Problems      Relation: Mother          Age of Onset: (Not Specified)  Problem: Heart disease      Relation: Father          Age of Onset: (Not Specified)  Problem: Heart failure      Relation: Father          Age of Onset: (Not Specified)  Problem: Arrhythmia      Relation: Father          Age of Onset: (Not Specified)      Social History    Socioeconomic History      Marital status:     Tobacco Use      Smoking status: Never      Smokeless tobacco: Never    Substance and Sexual Activity      Alcohol use: No    Social Determinants of Health  Financial  Resource Strain: Medium Risk (12/28/2023)      Overall Financial Resource Strain (CARDIA)          Difficulty of Paying Living Expenses: Somewhat hard  Food Insecurity: Food Insecurity Present (12/28/2023)      Hunger Vital Sign          Ran Out of Food in the Last Year: Often true  Transportation Needs: No Transportation Needs (12/28/2023)      PRAPARE - Transportation          Lack of Transportation (Medical): No          Lack of Transportation (Non-Medical): No  Physical Activity: Insufficiently Active (12/28/2023)      Exercise Vital Sign          Days of Exercise per Week: 3 days          Minutes of Exercise per Session: 20 min  Stress: Stress Concern Present (12/28/2023)      [employer] [place] [employer] - Occupational Stress Questionnaire          Feeling of Stress : Very much  Social Connections: Unknown (12/28/2023)      Social Connection and Isolation Panel [NHANES]          Frequency of Communication with Friends and Family: More than three times a week          Frequency of Social Gatherings with Friends and Family: Twice a week           Active Member of Clubs or Organizations: No          Attends Club or Organization Meetings: Never          Marital Status:   Housing Stability: High Risk (12/28/2023)      Housing Stability Vital Sign          Unable to Pay for Housing in the Last Year: Yes          Number of Places Lived in the Last Year: 1          Unstable Housing in the Last Year: No    Review of patient's allergies indicates:   -- Ciprofloxacin -- Nausea And Vomiting    Current Outpatient Medications: ·  albuterol (VENTOLIN HFA) 90 mcg/actuation inhaler, Inhale 2 puffs into the lungs every 6 (six) hours as needed for Wheezing (Rescue Inhaler). Rescue, Disp: 6.7 g, Rfl: 11·  ALPRAZolam (XANAX) 1 MG tablet, Take 1 tablet (1 mg total) by mouth 2 (two) times daily., Disp: 60 tablet, Rfl: 2·  buPROPion (WELLBUTRIN XL) 150 MG TB24 tablet, Take 1 tablet (150 mg total) by mouth once  daily., Disp: 90 tablet, Rfl: 0·  carvediloL (COREG) 3.125 MG tablet, Take 1 tablet (3.125 mg total) by mouth 2 (two) times daily with meals., Disp: 180 tablet, Rfl: 3·  desloratadine (CLARINEX) 5 mg tablet, Take 1 tablet (5 mg total) by mouth once daily., Disp: 30 tablet, Rfl: 11·  DULoxetine (CYMBALTA) 60 MG capsule, Take 1 capsule (60 mg total) by mouth 2 (two) times daily., Disp: 180 capsule, Rfl: 3·  furosemide (LASIX) 40 MG tablet, Take 1 tablet (40 mg total) by mouth 2 (two) times daily., Disp: 180 tablet, Rfl: 1·  ibuprofen (ADVIL,MOTRIN) 800 MG tablet, Take 1 tablet (800 mg total) by mouth 3 (three) times daily as needed for Pain. As needed, Disp: 90 tablet, Rfl: 1·  insulin (LANTUS SOLOSTAR U-100 INSULIN) glargine 100 units/mL SubQ pen, Inject 30 Units into the skin every evening., Disp: 9 mL, Rfl: 5·  losartan (COZAAR) 50 MG tablet, Take 1 tablet (50 mg total) by mouth once daily., Disp: 90 tablet, Rfl: 3·  metFORMIN (GLUCOPHAGE) 1000 MG tablet, Take 1 tablet (1,000 mg total) by mouth 2 (two) times daily., Disp: 180 tablet, Rfl: 1·  montelukast (SINGULAIR) 10 mg tablet, Take 1 tablet (10 mg total) by mouth every evening., Disp: 90 tablet, Rfl: 3·  mupirocin (BACTROBAN) 2 % ointment, APPLY 1 GRAM TO EACH NOSTRIL TWICE DAILY X 5 DAYS , START 2-20-23, Disp: 22 g, Rfl: 0·  mupirocin (BACTROBAN) 2 % ointment, Apply topically 3 (three) times daily., Disp: 30 g, Rfl: 6·  NOVOLOG FLEXPEN U-100 INSULIN 100 unit/mL (3 mL) InPn pen, 6 u Ac + correction  Max TDD 54u, Disp: 30 mL, Rfl: 11·  oxybutynin (DITROPAN-XL) 5 MG TR24, Take 1 tablet (5 mg total) by mouth once daily., Disp: 90 tablet, Rfl: 3·  oxyCODONE-acetaminophen (PERCOCET) 5-325 mg per tablet, Take 1 tablet by mouth every 4 to 6 hours as needed for Pain., Disp: 27 tablet, Rfl: 0·  pantoprazole (PROTONIX) 40 MG tablet, Take 1 tablet (40 mg total) by mouth once daily., Disp: 90 tablet, Rfl: 3·  pen needle, diabetic (BD KARYN 2ND GEN PEN NEEDLE) 32 gauge x  "5/32" Ndle, Checks bg 4 times a day, Disp: 150 each, Rfl: 12·  potassium chloride (KLOR-CON) 10 MEQ TbSR, Take 1 tablet (10 mEq total) by mouth once daily. (Patient not taking: Reported on 3/19/2024), Disp: 90 tablet, Rfl: 3·  pregabalin (LYRICA) 150 MG capsule, Take 1 capsule (150 mg total) by mouth 3 (three) times daily., Disp: 270 capsule, Rfl: 1·  promethazine (PHENERGAN) 25 MG tablet, Take 1 tablet (25 mg total) by mouth before meals as needed for Nausea., Disp: 30 tablet, Rfl: 1·  rOPINIRole (REQUIP) 2 MG tablet, Take 1 tablet (2 mg total) by mouth 3 (three) times daily., Disp: 270 tablet, Rfl: 3·  rosuvastatin (CRESTOR) 20 MG tablet, Take 1 tablet (20 mg total) by mouth every evening., Disp: 90 tablet, Rfl: 3·  spironolactone (ALDACTONE) 50 MG tablet, Take 1 tablet (50 mg total) by mouth once daily., Disp: 360 tablet, Rfl: 0·  SYMBICORT 80-4.5 mcg/actuation HFAA, Inhale 2 puffs into the lungs 2 (two) times a day., Disp: 10.2 g, Rfl: 11·  temazepam (RESTORIL) 30 mg capsule, Take 1 capsule (30 mg total) by mouth nightly as needed for Insomnia., Disp: 30 capsule, Rfl: 2·  TENS units Cristiana, 1 Units by Misc.(Non-Drug; Combo Route) route daily as needed., Disp: 1 each, Rfl: 0·  tirzepatide 7.5 mg/0.5 mL PnIj, Inject 7.5 mg into the skin every 7 days., Disp: 4 Pen, Rfl: 11·  tiZANidine (ZANAFLEX) 4 MG tablet, Take 1 tablet (4 mg total) by mouth 3 (three) times daily., Disp: 90 tablet, Rfl: 3·  TRUE METRIX GLUCOSE TEST STRIP Strp, USE TO TEST BLOOD SUGAR TWICE DAILY, Disp: 50 strip, Rfl: 1    /62   Pulse 79   Resp 18   Wt 99.5 kg (219 lb 5.7 oz)   SpO2 95%   BMI 41.45 kg/m²             Follow-up  Associated symptoms include arthralgias. Pertinent negatives include no abdominal pain, chest pain, coughing, fatigue, fever, headaches, nausea, numbness or vomiting.   Neurologic Problem  The patient's primary symptoms include an altered mental status and memory loss. The problem has been gradually worsening since " onset. There was no focality noted. Associated symptoms include back pain, bladder incontinence and confusion. Pertinent negatives include no abdominal pain, chest pain, dizziness, fatigue, fever, headaches, nausea, palpitations, shortness of breath or vomiting. Past treatments include nothing.     Review of Systems   Constitutional:  Positive for activity change. Negative for fatigue, fever and unexpected weight change.   HENT: Negative.     Eyes: Negative.    Respiratory:  Negative for cough, chest tightness, shortness of breath and wheezing.    Cardiovascular:  Negative for chest pain, palpitations, leg swelling and claudication.   Gastrointestinal:  Negative for abdominal pain, blood in stool, diarrhea, nausea, rectal pain, vomiting and reflux.   Endocrine: Negative.    Genitourinary:  Positive for bladder incontinence. Negative for dysuria, flank pain, frequency, hematuria and urgency.   Musculoskeletal:  Positive for arthralgias, back pain and gait problem.   Integumentary:  Negative.   Neurological:  Positive for memory loss. Negative for dizziness, seizures, speech difficulty, numbness and headaches.   Hematological: Negative.    Psychiatric/Behavioral:  Positive for confusion and dysphoric mood. Negative for sleep disturbance and suicidal ideas. The patient is nervous/anxious.           Objective     Physical Exam  Vitals reviewed.   Constitutional:       General: She is not in acute distress.     Appearance: Normal appearance. She is not ill-appearing, toxic-appearing or diaphoretic.   HENT:      Head: Normocephalic and atraumatic.      Right Ear: Tympanic membrane, ear canal and external ear normal. There is no impacted cerumen.      Left Ear: Tympanic membrane, ear canal and external ear normal. There is no impacted cerumen.      Nose: Nose normal.   Eyes:      Conjunctiva/sclera: Conjunctivae normal.   Neck:      Vascular: No carotid bruit.   Cardiovascular:      Rate and Rhythm: Normal rate and regular  rhythm.      Pulses:           Dorsalis pedis pulses are 1+ on the right side and 1+ on the left side.        Posterior tibial pulses are 1+ on the right side and 1+ on the left side.      Heart sounds: Normal heart sounds. No murmur heard.     No friction rub. No gallop.   Pulmonary:      Effort: Pulmonary effort is normal. No respiratory distress.      Breath sounds: Normal breath sounds. No stridor. No wheezing, rhonchi or rales.   Chest:      Chest wall: No tenderness.   Abdominal:      General: There is distension.      Palpations: Abdomen is soft.      Tenderness: There is no abdominal tenderness.   Musculoskeletal:         General: No swelling or tenderness.      Cervical back: No rigidity or tenderness.      Right foot: Normal range of motion. No deformity, bunion, Charcot foot, foot drop or prominent metatarsal heads.      Left foot: Normal range of motion. No deformity, bunion, Charcot foot, foot drop or prominent metatarsal heads.   Feet:      Right foot:      Protective Sensation: 9 sites tested.  9 sites sensed.      Skin integrity: Skin integrity normal.      Toenail Condition: Right toenails are normal.      Left foot:      Protective Sensation: 9 sites tested.  9 sites sensed.      Skin integrity: Skin integrity normal.      Toenail Condition: Left toenails are normal.   Lymphadenopathy:      Cervical: No cervical adenopathy.   Neurological:      Mental Status: She is alert.       Lab Results   Component Value Date    WBC 10.91 12/21/2023    HGB 11.1 (L) 12/21/2023    HCT 36.1 (L) 12/21/2023    MCV 86 12/21/2023     12/21/2023     CMP  Sodium   Date Value Ref Range Status   12/21/2023 142 136 - 145 mmol/L Final     Potassium   Date Value Ref Range Status   12/21/2023 3.6 3.5 - 5.1 mmol/L Final     Comment:     Anion Gap reference range revised on 4/28/2023     Chloride   Date Value Ref Range Status   12/21/2023 102 95 - 110 mmol/L Final     CO2   Date Value Ref Range Status   12/21/2023 25 22  - 31 mmol/L Final     Glucose   Date Value Ref Range Status   12/21/2023 43 (LL) 70 - 110 mg/dL Final     Comment:     The ADA recommends the following guidelines for fasting glucose:    Normal:       less than 100 mg/dL    Prediabetes:  100 mg/dL to 125 mg/dL    Diabetes:     126 mg/dL or higher  Glucose critical result(s) called and verbal readback obtained from   Marcia Orozco MA by POR 12/21/2023 15:23       BUN   Date Value Ref Range Status   12/21/2023 15 7 - 18 mg/dL Final     Creatinine   Date Value Ref Range Status   12/21/2023 0.82 0.50 - 1.40 mg/dL Final     Calcium   Date Value Ref Range Status   12/21/2023 8.6 8.4 - 10.2 mg/dL Final     Total Protein   Date Value Ref Range Status   12/21/2023 7.0 6.0 - 8.4 g/dL Final     Albumin   Date Value Ref Range Status   12/21/2023 4.0 3.5 - 5.2 g/dL Final     Total Bilirubin   Date Value Ref Range Status   12/21/2023 0.6 0.2 - 1.3 mg/dL Final     Alkaline Phosphatase   Date Value Ref Range Status   12/21/2023 128 38 - 145 U/L Final     AST   Date Value Ref Range Status   12/21/2023 34 14 - 36 U/L Final     ALT   Date Value Ref Range Status   12/21/2023 21 0 - 35 U/L Final     Anion Gap   Date Value Ref Range Status   12/21/2023 15 (H) 5 - 12 mmol/L Final     Comment:     Anion Gap reference range revised on 4/28/2023     eGFR if    Date Value Ref Range Status   11/21/2019 >60 >60 mL/min/1.73 m^2 Final     eGFR if non    Date Value Ref Range Status   11/21/2019 >60 >60 mL/min/1.73 m^2 Final     Comment:     Calculation used to obtain the estimated glomerular filtration  rate (eGFR) is the CKD-EPI equation.        Lab Results   Component Value Date    CHOL 140 10/02/2023     Lab Results   Component Value Date    HDL 36 (L) 10/02/2023     Lab Results   Component Value Date    LDLCALC 69.8 10/02/2023     Lab Results   Component Value Date    TRIG 171 (H) 10/02/2023     Lab Results   Component Value Date    CHOLHDL 25.7 10/02/2023      Lab Results   Component Value Date    HGBA1C 7.2 (H) 10/02/2023         Assessment and Plan     1. Memory loss of unknown cause  -     T3, Free; Future; Expected date: 04/02/2024  -     T4, Free; Future; Expected date: 04/02/2024  -     TSH; Future; Expected date: 04/02/2024  -     Vitamin B12; Future; Expected date: 04/02/2024  -     Hemoglobin A1C; Future; Expected date: 04/02/2024  -     RPR; Future; Expected date: 04/02/2024  -     POCT Urinalysis(Instrument)  -     Urine culture  -     Ambulatory referral/consult to Neurology; Future; Expected date: 04/09/2024    2. Other amnesia  -     MRI Brain W WO Contrast; Future; Expected date: 04/02/2024    3. Restless leg syndrome  -     rOPINIRole (REQUIP) 2 MG tablet; Take 1 tablet (2 mg total) by mouth 3 (three) times daily.  Dispense: 270 tablet; Refill: 3    4. Panic disorder  -     ALPRAZolam (XANAX) 1 MG tablet; Take 1 tablet (1 mg total) by mouth 2 (two) times daily.  Dispense: 60 tablet; Refill: 2    5. Depression, unspecified depression type  -     buPROPion (WELLBUTRIN XL) 150 MG TB24 tablet; Take 1 tablet (150 mg total) by mouth once daily.  Dispense: 90 tablet; Refill: 0  -     DULoxetine (CYMBALTA) 60 MG capsule; Take 1 capsule (60 mg total) by mouth 2 (two) times daily.  Dispense: 180 capsule; Refill: 3  -     losartan (COZAAR) 50 MG tablet; Take 1 tablet (50 mg total) by mouth once daily.  Dispense: 90 tablet; Refill: 3  -     Ambulatory referral/consult to Psychology; Future; Expected date: 04/09/2024    6. Diastolic dysfunction  -     carvediloL (COREG) 3.125 MG tablet; Take 1 tablet (3.125 mg total) by mouth 2 (two) times daily with meals.  Dispense: 180 tablet; Refill: 3  -     furosemide (LASIX) 40 MG tablet; Take 1 tablet (40 mg total) by mouth 2 (two) times daily.  Dispense: 180 tablet; Refill: 1  -     spironolactone (ALDACTONE) 50 MG tablet; Take 1 tablet (50 mg total) by mouth once daily.  Dispense: 360 tablet; Refill: 0    7. Seasonal  allergies  -     desloratadine (CLARINEX) 5 mg tablet; Take 1 tablet (5 mg total) by mouth once daily.  Dispense: 30 tablet; Refill: 11    8. Type 2 diabetes mellitus with hyperglycemia, with long-term current use of insulin  -     insulin (LANTUS SOLOSTAR U-100 INSULIN) glargine 100 units/mL SubQ pen; Inject 30 Units into the skin every evening.  Dispense: 9 mL; Refill: 5  -     NOVOLOG FLEXPEN U-100 INSULIN 100 unit/mL (3 mL) InPn pen; 6 u Ac + correction  Max TDD 54u  Dispense: 30 mL; Refill: 11    9. Type 2 diabetes mellitus with diabetic polyneuropathy, with long-term current use of insulin  -     metFORMIN (GLUCOPHAGE) 1000 MG tablet; Take 1 tablet (1,000 mg total) by mouth 2 (two) times daily.  Dispense: 180 tablet; Refill: 1  -     pregabalin (LYRICA) 150 MG capsule; Take 1 capsule (150 mg total) by mouth 3 (three) times daily.  Dispense: 270 capsule; Refill: 1    10. Cough, unspecified type  -     montelukast (SINGULAIR) 10 mg tablet; Take 1 tablet (10 mg total) by mouth every evening.  Dispense: 90 tablet; Refill: 3  -     SYMBICORT 80-4.5 mcg/actuation HFAA; Inhale 2 puffs into the lungs 2 (two) times a day.  Dispense: 10.2 g; Refill: 11    11. Urinary urgency  -     oxybutynin (DITROPAN-XL) 5 MG TR24; Take 1 tablet (5 mg total) by mouth once daily.  Dispense: 90 tablet; Refill: 3    12. Chronic cough  -     pantoprazole (PROTONIX) 40 MG tablet; Take 1 tablet (40 mg total) by mouth once daily.  Dispense: 90 tablet; Refill: 3    13. Gastroesophageal reflux disease without esophagitis  -     pantoprazole (PROTONIX) 40 MG tablet; Take 1 tablet (40 mg total) by mouth once daily.  Dispense: 90 tablet; Refill: 3    14. DM type 2 with diabetic mixed hyperlipidemia  -     Comprehensive Metabolic Panel; Future; Expected date: 04/02/2024  -     Hemoglobin A1C; Future; Expected date: 04/02/2024  -     MICROALBUMIN / CREATININE RATIO URINE  -     rosuvastatin (CRESTOR) 20 MG tablet; Take 1 tablet (20 mg total) by  mouth every evening.  Dispense: 90 tablet; Refill: 3    15. Wellness examination  -     HEPATITIS C ANTIBODY; Future; Expected date: 04/02/2024  -     HIV 1/2 Ag/Ab (4th Gen); Future; Expected date: 04/02/2024    16. Osteoarthritis of lumbar spine, unspecified spinal osteoarthritis complication status  -     WHEELCHAIR FOR HOME USE    17. Chronic diastolic heart failure  -     WHEELCHAIR FOR HOME USE    Other orders  -     tirzepatide 7.5 mg/0.5 mL PnIj; Inject 7.5 mg into the skin every 7 days.  Dispense: 4 Pen; Refill: 11  -     albuterol (VENTOLIN HFA) 90 mcg/actuation inhaler; Inhale 2 puffs into the lungs every 6 (six) hours as needed for Wheezing (Rescue Inhaler). Rescue  Dispense: 6.7 g; Refill: 11  -     temazepam (RESTORIL) 30 mg capsule; Take 1 capsule (30 mg total) by mouth nightly as needed for Insomnia.  Dispense: 30 capsule; Refill: 2  -     tiZANidine (ZANAFLEX) 4 MG tablet; Take 1 tablet (4 mg total) by mouth 3 (three) times daily.  Dispense: 90 tablet; Refill: 3  -     ibuprofen (ADVIL,MOTRIN) 800 MG tablet; Take 1 tablet (800 mg total) by mouth 3 (three) times daily as needed for Pain. As needed  Dispense: 90 tablet; Refill: 1        I spent 50 minutes on this encounter, time includes face-to-face, chart review, documentation, test review and orders.           Follow up in about 3 months (around 7/2/2024).

## 2024-04-03 LAB — RPR SER QL: NORMAL

## 2024-04-04 ENCOUNTER — TELEPHONE (OUTPATIENT)
Dept: NEUROLOGY | Facility: CLINIC | Age: 64
End: 2024-04-04
Payer: MEDICARE

## 2024-04-04 ENCOUNTER — PATIENT MESSAGE (OUTPATIENT)
Dept: PRIMARY CARE CLINIC | Facility: CLINIC | Age: 64
End: 2024-04-04
Payer: MEDICARE

## 2024-04-04 DIAGNOSIS — E55.9 VITAMIN D DEFICIENCY: ICD-10-CM

## 2024-04-04 DIAGNOSIS — R41.3 MEMORY LOSS: Primary | ICD-10-CM

## 2024-04-04 NOTE — TELEPHONE ENCOUNTER
Spoke to the pt, appt scheduled on 6/19/24 at 0900 with Sultana Jenkins.  Date, time and location discussed.      Please order the following tests prior to pt's upcoming appt with neurology for memory evaluation:  Neuropsych Testing (ref 47) and Labs:  Folate, CBC, Vit D.

## 2024-04-05 ENCOUNTER — PROCEDURE VISIT (OUTPATIENT)
Dept: UROLOGY | Facility: CLINIC | Age: 64
End: 2024-04-05
Payer: MEDICARE

## 2024-04-05 VITALS — BODY MASS INDEX: 41.54 KG/M2 | WEIGHT: 220 LBS | HEIGHT: 61 IN

## 2024-04-05 DIAGNOSIS — N39.3 STRESS INCONTINENCE: ICD-10-CM

## 2024-04-05 DIAGNOSIS — N39.0 RECURRENT UTI: ICD-10-CM

## 2024-04-05 LAB
BILIRUBIN, UA POC OHS: NEGATIVE
BLOOD, UA POC OHS: ABNORMAL
CLARITY, UA POC OHS: ABNORMAL
COLOR, UA POC OHS: YELLOW
GLUCOSE, UA POC OHS: NEGATIVE
KETONES, UA POC OHS: NEGATIVE
LEUKOCYTES, UA POC OHS: ABNORMAL
NITRITE, UA POC OHS: NEGATIVE
PH, UA POC OHS: 6
PROTEIN, UA POC OHS: 100
SPECIFIC GRAVITY, UA POC OHS: 1.02
UROBILINOGEN, UA POC OHS: 2

## 2024-04-05 PROCEDURE — 52000 CYSTOURETHROSCOPY: CPT | Mod: S$GLB,,, | Performed by: UROLOGY

## 2024-04-05 PROCEDURE — 81003 URINALYSIS AUTO W/O SCOPE: CPT | Mod: QW,S$GLB,, | Performed by: UROLOGY

## 2024-04-05 NOTE — PROCEDURES
Cystoscopy    Date/Time: 4/5/2024 8:00 AM    Performed by: RISA Godoy MD  Authorized by: Sarah Davis NP    Consent Done?:  Yes (Written)  Timeout: prior to procedure the correct patient, procedure, and site was verified    Prep: patient was prepped and draped in usual sterile fashion    Indications: recurrent UTIs and dysuria    Position:  Other  Patient sedated?: No    Preparation: Patient was prepped and draped in usual sterile fashion    Scope type:  Flexible cystoscope   patient tolerated the procedure well with no immediate complications    Blood Loss:  None    The patients clinic history and physical were reviewed and there are no changes.    The patient was placed in the frog-leg position.  The genitals were prepped and draped in a sterile fashion.   Using a flexible scope, a careful cystoscopic exam was then performed.  The entire bladder mucosa was systematically visualized.  The mucosa appeared normal.  There were no lesions, masses foreign bodies or stones.   Each ureteral orifices were visualized and both had clear efflux of urine.  On retroflexion the bladder neck appeared normal.  The cystoscope was then removed and I examined the entire length of the urethra.  The urethra appeared normal.  She tolerated the procedure well.  There were no complications.  Atrophic vaginitis noted on exam with vulvar erythema.    Impression:  Normal cystoscopy.  Recommend gyn evaluation

## 2024-04-08 NOTE — TELEPHONE ENCOUNTER
Nevaeh Paz has placed those orders. Do we need to schedule them or will they be done at another clinic?

## 2024-04-09 ENCOUNTER — PATIENT MESSAGE (OUTPATIENT)
Dept: PRIMARY CARE CLINIC | Facility: CLINIC | Age: 64
End: 2024-04-09
Payer: MEDICARE

## 2024-04-09 RX ORDER — SEMAGLUTIDE 2.68 MG/ML
2 INJECTION, SOLUTION SUBCUTANEOUS
Qty: 3 ML | Refills: 11 | Status: SHIPPED | OUTPATIENT
Start: 2024-04-09 | End: 2024-06-19

## 2024-04-16 ENCOUNTER — LAB VISIT (OUTPATIENT)
Dept: LAB | Facility: HOSPITAL | Age: 64
End: 2024-04-16
Attending: PHYSICIAN ASSISTANT
Payer: MEDICARE

## 2024-04-16 DIAGNOSIS — R41.3 MEMORY LOSS: ICD-10-CM

## 2024-04-16 DIAGNOSIS — E55.9 VITAMIN D DEFICIENCY: ICD-10-CM

## 2024-04-16 LAB
BASOPHILS # BLD AUTO: 0.03 K/UL (ref 0–0.2)
BASOPHILS NFR BLD: 0.4 % (ref 0–1.9)
DIFFERENTIAL METHOD BLD: ABNORMAL
EOSINOPHIL # BLD AUTO: 0.1 K/UL (ref 0–0.5)
EOSINOPHIL NFR BLD: 1 % (ref 0–8)
ERYTHROCYTE [DISTWIDTH] IN BLOOD BY AUTOMATED COUNT: 15.8 % (ref 11.5–14.5)
HCT VFR BLD AUTO: 31.1 % (ref 37–48.5)
HGB BLD-MCNC: 10 G/DL (ref 12–16)
IMM GRANULOCYTES # BLD AUTO: 0.04 K/UL (ref 0–0.04)
IMM GRANULOCYTES NFR BLD AUTO: 0.6 % (ref 0–0.5)
LYMPHOCYTES # BLD AUTO: 1.3 K/UL (ref 1–4.8)
LYMPHOCYTES NFR BLD: 18.8 % (ref 18–48)
MCH RBC QN AUTO: 25.1 PG (ref 27–31)
MCHC RBC AUTO-ENTMCNC: 32.2 G/DL (ref 32–36)
MCV RBC AUTO: 78 FL (ref 82–98)
MONOCYTES # BLD AUTO: 0.4 K/UL (ref 0.3–1)
MONOCYTES NFR BLD: 5.5 % (ref 4–15)
NEUTROPHILS # BLD AUTO: 5.2 K/UL (ref 1.8–7.7)
NEUTROPHILS NFR BLD: 73.7 % (ref 38–73)
NRBC BLD-RTO: 0 /100 WBC
PLATELET # BLD AUTO: 229 K/UL (ref 150–450)
PMV BLD AUTO: 10.2 FL (ref 9.2–12.9)
RBC # BLD AUTO: 3.98 M/UL (ref 4–5.4)
WBC # BLD AUTO: 7.06 K/UL (ref 3.9–12.7)

## 2024-04-16 PROCEDURE — 85025 COMPLETE CBC W/AUTO DIFF WBC: CPT | Mod: PO | Performed by: PHYSICIAN ASSISTANT

## 2024-04-16 PROCEDURE — 82652 VIT D 1 25-DIHYDROXY: CPT | Performed by: PHYSICIAN ASSISTANT

## 2024-04-16 PROCEDURE — 82746 ASSAY OF FOLIC ACID SERUM: CPT | Performed by: PHYSICIAN ASSISTANT

## 2024-04-17 DIAGNOSIS — E11.9 TYPE 2 DIABETES MELLITUS WITHOUT COMPLICATION: ICD-10-CM

## 2024-04-17 LAB — FOLATE SERPL-MCNC: 4.8 NG/ML (ref 4–24)

## 2024-04-18 LAB — 1,25(OH)2D3 SERPL-MCNC: 55 PG/ML (ref 20–79)

## 2024-04-19 ENCOUNTER — TELEPHONE (OUTPATIENT)
Dept: PRIMARY CARE CLINIC | Facility: CLINIC | Age: 64
End: 2024-04-19
Payer: MEDICARE

## 2024-04-19 NOTE — TELEPHONE ENCOUNTER
----- Message from Fer Herring III, PA-C sent at 4/19/2024  7:15 AM CDT -----  Amanda Barton    The lab shows some anemia present. I recommend daily iron to help with this

## 2024-04-24 ENCOUNTER — HOSPITAL ENCOUNTER (OUTPATIENT)
Dept: RADIOLOGY | Facility: HOSPITAL | Age: 64
Discharge: HOME | End: 2024-04-24
Attending: PHYSICIAN ASSISTANT
Payer: MEDICARE

## 2024-05-03 DIAGNOSIS — F41.0 PANIC DISORDER: ICD-10-CM

## 2024-05-03 RX ORDER — ALPRAZOLAM 1 MG/1
1 TABLET ORAL 2 TIMES DAILY
Qty: 60 TABLET | Refills: 0 | Status: SHIPPED | OUTPATIENT
Start: 2024-05-03 | End: 2024-08-01

## 2024-05-08 DIAGNOSIS — Z78.0 MENOPAUSE: ICD-10-CM

## 2024-05-13 DIAGNOSIS — R41.3 OTHER AMNESIA: Primary | ICD-10-CM

## 2024-05-14 ENCOUNTER — HOSPITAL ENCOUNTER (OUTPATIENT)
Dept: RADIOLOGY | Facility: HOSPITAL | Age: 64
Discharge: HOME | End: 2024-05-14
Attending: PHYSICIAN ASSISTANT
Payer: MEDICARE

## 2024-05-24 ENCOUNTER — PATIENT MESSAGE (OUTPATIENT)
Dept: PRIMARY CARE CLINIC | Facility: CLINIC | Age: 64
End: 2024-05-24
Payer: MEDICARE

## 2024-05-24 RX ORDER — TEMAZEPAM 30 MG/1
30 CAPSULE ORAL NIGHTLY PRN
Qty: 30 CAPSULE | Refills: 2 | Status: SHIPPED | OUTPATIENT
Start: 2024-05-24

## 2024-06-03 ENCOUNTER — PATIENT MESSAGE (OUTPATIENT)
Dept: PRIMARY CARE CLINIC | Facility: CLINIC | Age: 64
End: 2024-06-03
Payer: MEDICARE

## 2024-06-03 DIAGNOSIS — Z79.4 TYPE 2 DIABETES MELLITUS WITH HYPERGLYCEMIA, WITH LONG-TERM CURRENT USE OF INSULIN: ICD-10-CM

## 2024-06-03 DIAGNOSIS — E11.65 TYPE 2 DIABETES MELLITUS WITH HYPERGLYCEMIA, WITH LONG-TERM CURRENT USE OF INSULIN: ICD-10-CM

## 2024-06-03 RX ORDER — PEN NEEDLE, DIABETIC 30 GX3/16"
NEEDLE, DISPOSABLE MISCELLANEOUS
Qty: 150 EACH | Refills: 12 | Status: SHIPPED | OUTPATIENT
Start: 2024-06-03

## 2024-06-07 ENCOUNTER — HOSPITAL ENCOUNTER (OUTPATIENT)
Dept: RADIOLOGY | Facility: HOSPITAL | Age: 64
Discharge: HOME | End: 2024-06-07
Attending: PHYSICIAN ASSISTANT
Payer: MEDICARE

## 2024-06-14 ENCOUNTER — TELEPHONE (OUTPATIENT)
Dept: SURGERY | Facility: CLINIC | Age: 64
End: 2024-06-14
Payer: MEDICARE

## 2024-06-14 ENCOUNTER — PATIENT MESSAGE (OUTPATIENT)
Dept: PRIMARY CARE CLINIC | Facility: CLINIC | Age: 64
End: 2024-06-14
Payer: MEDICARE

## 2024-06-14 DIAGNOSIS — E11.42 TYPE 2 DIABETES MELLITUS WITH DIABETIC POLYNEUROPATHY, WITH LONG-TERM CURRENT USE OF INSULIN: Primary | ICD-10-CM

## 2024-06-14 DIAGNOSIS — Z79.4 TYPE 2 DIABETES MELLITUS WITH DIABETIC POLYNEUROPATHY, WITH LONG-TERM CURRENT USE OF INSULIN: Primary | ICD-10-CM

## 2024-06-14 RX ORDER — BLOOD-GLUCOSE SENSOR
EACH MISCELLANEOUS
Qty: 3 EACH | Refills: 12 | Status: SHIPPED | OUTPATIENT
Start: 2024-06-14

## 2024-06-14 RX ORDER — BLOOD-GLUCOSE TRANSMITTER
EACH MISCELLANEOUS
Qty: 1 EACH | Refills: 3 | Status: SHIPPED | OUTPATIENT
Start: 2024-06-14

## 2024-06-14 RX ORDER — BLOOD-GLUCOSE,RECEIVER,CONT
EACH MISCELLANEOUS
Qty: 1 EACH | Refills: 0 | Status: SHIPPED | OUTPATIENT
Start: 2024-06-14

## 2024-06-14 NOTE — TELEPHONE ENCOUNTER
Lvm -- advised to send pt to ER if symptomatic.    ----- Message from Maria Alejandra Goff, Patient Care Assistant sent at 6/14/2024 10:31 AM CDT -----  Type: Needs Medical Advice  Who Called:  DR. Raheem Bond Call Back Number: [phone]    Additional Information: Brain states above patient needs to have a infusion  her hemoglobin has dropped in the last 30 day  went from 11 - 8.4 . And is symptom matic , please call to further discuss , thank you .

## 2024-06-14 NOTE — TELEPHONE ENCOUNTER
Chrism --  full       ----- Message from Melissa Currie sent at 6/14/2024  3:09 PM CDT -----  Contact: self  Type:  Sooner Apoointment Request     Name of Caller: Pt   When is the first available appointment? N/A  Symptoms: Low Iron / Last appt 2/27/23  Would the patient rather a call back or a response via [facility]? call  Best Call Back Number: [phone]  Please call to advise/schedule... Thank you...

## 2024-06-17 RX ORDER — LANCETS
1 EACH MISCELLANEOUS 2 TIMES DAILY
Qty: 200 EACH | Refills: 11 | Status: SHIPPED | OUTPATIENT
Start: 2024-06-17

## 2024-06-17 RX ORDER — INSULIN PUMP SYRINGE, 3 ML
EACH MISCELLANEOUS
Qty: 1 EACH | Refills: 0 | Status: SHIPPED | OUTPATIENT
Start: 2024-06-17

## 2024-06-17 RX ORDER — LANCING DEVICE
1 EACH MISCELLANEOUS 2 TIMES DAILY
Qty: 1 EACH | Refills: 0 | Status: SHIPPED | OUTPATIENT
Start: 2024-06-17 | End: 2025-06-17

## 2024-06-19 ENCOUNTER — TELEPHONE (OUTPATIENT)
Dept: NEUROLOGY | Facility: CLINIC | Age: 64
End: 2024-06-19

## 2024-06-19 ENCOUNTER — OFFICE VISIT (OUTPATIENT)
Dept: NEUROLOGY | Facility: CLINIC | Age: 64
End: 2024-06-19
Payer: MEDICARE

## 2024-06-19 ENCOUNTER — LAB VISIT (OUTPATIENT)
Dept: LAB | Facility: HOSPITAL | Age: 64
End: 2024-06-19
Attending: NURSE PRACTITIONER
Payer: MEDICARE

## 2024-06-19 VITALS
SYSTOLIC BLOOD PRESSURE: 148 MMHG | HEART RATE: 74 BPM | DIASTOLIC BLOOD PRESSURE: 77 MMHG | HEIGHT: 61 IN | BODY MASS INDEX: 41.57 KG/M2 | RESPIRATION RATE: 14 BRPM

## 2024-06-19 DIAGNOSIS — D50.8 OTHER IRON DEFICIENCY ANEMIA: Primary | ICD-10-CM

## 2024-06-19 DIAGNOSIS — F41.0 PANIC DISORDER: ICD-10-CM

## 2024-06-19 DIAGNOSIS — R41.3 MEMORY LOSS OF UNKNOWN CAUSE: ICD-10-CM

## 2024-06-19 DIAGNOSIS — R41.3 MEMORY LOSS: ICD-10-CM

## 2024-06-19 DIAGNOSIS — D50.8 OTHER IRON DEFICIENCY ANEMIA: ICD-10-CM

## 2024-06-19 DIAGNOSIS — F32.A DEPRESSION, UNSPECIFIED DEPRESSION TYPE: ICD-10-CM

## 2024-06-19 DIAGNOSIS — G93.40 ENCEPHALOPATHY: ICD-10-CM

## 2024-06-19 DIAGNOSIS — R29.818 TRANSIENT NEUROLOGICAL SYMPTOMS: ICD-10-CM

## 2024-06-19 DIAGNOSIS — G25.81 RESTLESS LEG SYNDROME: ICD-10-CM

## 2024-06-19 LAB
AMMONIA PLAS-SCNC: 28 UMOL/L (ref 10–50)
FERRITIN SERPL-MCNC: 833 NG/ML (ref 20–300)

## 2024-06-19 PROCEDURE — 82140 ASSAY OF AMMONIA: CPT | Performed by: NURSE PRACTITIONER

## 2024-06-19 PROCEDURE — 82728 ASSAY OF FERRITIN: CPT | Performed by: NURSE PRACTITIONER

## 2024-06-19 PROCEDURE — 99999 PR PBB SHADOW E&M-EST. PATIENT-LVL V: CPT | Mod: PBBFAC,,, | Performed by: NURSE PRACTITIONER

## 2024-06-19 PROCEDURE — 83921 ORGANIC ACID SINGLE QUANT: CPT | Performed by: NURSE PRACTITIONER

## 2024-06-19 NOTE — PROGRESS NOTES
NEUROLOGY  Outpatient Consultation Visit     [facility]  [address] [facility], [place] [address]  [phone] (office) / [phone] (fax)    Patient Name:  Amanda Barton  :  1960  MR #:  [id number]  Acct #:  [id number]    Date of  Visit: 2024    Other Physicians:  Fer Herring III, PA-C (Primary Care Physician)      CHIEF COMPLAINT: Memory Loss (New patient )        HISTORY OF PRESENT ILLNESS:  Amanda Barton is a 64 y.o. R-handed female seen in consultation for memory loss per Fer Herring III, P*    Medical history is significant for RLS, OA, depression, panic disorder, COPD, PVD, pAfib, HTN, DM2, HLD, GERALDO, vit D deficiency, GI bleed, GERD, BJ, MRSA bacteremia & endocarditis in     HS level education. Disabled since , had an infection in her spine after lumbar fusion. She lives with her adopted 15 yr old granddaughter. Here today with her daugther, who is with her during the day and supplies the majority of the history.     Lost first born grandchild in May 2023. Had episode of disorientation / confusion at her  service. Since, has continued to have these episodes. She repeats herself. She reverts back to the day of the service, asking when the service is and what time does it start. She has tried to open the car door while they were driving across the causeway. Her speech may be slurred. She often has a blank stare when questioned. She can walk, but seems off balance. She may get up and walk right out the door if they are home. Episodes occur 4-5 times per month. Some last all day vs 30-45 minutes.     Prior to this, daughter does recall her having some similar episodes during prolonged hospitalization for spinal infection, but they were not as severe and improved once she got home and off of all the medications.     ED visit a few months ago at [facility]. Had episode witnessed by provider, who suggested that episodes were related to trauma from the  "loss of her granddaughter. They apparently also discussed that the trauma could have "activated a dormant gene for neurodegenerative disease." Saw PCP, who placed a referral to neurology and also ordered MRI and labs.     She had some hallucinations in the past. She was seeing snakes in her room. She was taking Ambien at the time and is now off. She started Wellbutrin about a year ago to help with depression.    Daughter manages her medications since 2021 to ensure that she doesn't take too much. Has not driven in about 6 months because her vehicle is not working. Never had episode while driving. Her daughter also has to help her with her finances. She is able to cook.     Prescribed Xanax BID PRN for anxiety, long standing issue. Takes it at least nightly.   Restoril nightly for sleep. BJ, does use CPAP.  Also prescribed Percocet 3x per day & Lyrica 3x per day per Dr. Guan in pain management.   Has RLS, takes Requip 2 mg 3x per day. This was increased about 3 months ago by pain mgmt to help with burning in her legs when she sits too long. It doesn't work very well, but she does feel worse if she is off it. She has crawling sensations in her legs when in a car or when trying to fall asleep at night. She has been on this since 2021.     She has GERALDO. She just got iron infusions again, was on them previously. Has trouble tolerating oral Fe. To see hematology soon.     Has struggled with low Mg and K, so temporarily off Lasix and now supplementing.     Chronic low back pain that radiates in the L leg. Stays in WC around her home, does ambulate with a walker some. Can't walk long distances without having to rest, gets winded.     She is adopted and not aware of any medical history.     Non smoker. Denies ETOH use or history of.     Has a referral for psychiatry, but has never seen psychiatry. No showed last scheduled appt. NP testing has also been ordered.     Denies any known hx of seizure or CVA/TIA. "     Allergies:  Review of patient's allergies indicates:   Allergen Reactions    Ciprofloxacin Nausea And Vomiting       Current Medications:  Current Outpatient Medications   Medication Sig Dispense Refill    albuterol (VENTOLIN HFA) 90 mcg/actuation inhaler Inhale 2 puffs into the lungs every 6 (six) hours as needed for Wheezing (Rescue Inhaler). Rescue 6.7 g 11    ALPRAZolam (XANAX) 1 MG tablet TAKE 1 TABLET (1 MG TOTAL) BY MOUTH 2 (TWO) TIMES DAILY. 60 tablet 0    blood-glucose meter kit Use as instructed 1 each 0    blood-glucose meter,continuous (DEXCOM G6 ) Misc Use as directed 1 each 0    blood-glucose sensor (DEXCOM G6 SENSOR) Cristiana Use as directed 3 each 12    blood-glucose transmitter (DEXCOM G6 TRANSMITTER) Cristiana Use as directed 1 each 3    buPROPion (WELLBUTRIN XL) 150 MG TB24 tablet Take 1 tablet (150 mg total) by mouth once daily. 90 tablet 0    carvediloL (COREG) 3.125 MG tablet Take 1 tablet (3.125 mg total) by mouth 2 (two) times daily with meals. (Patient taking differently: Take 6.25 mg by mouth 2 (two) times daily with meals.) 180 tablet 3    desloratadine (CLARINEX) 5 mg tablet Take 1 tablet (5 mg total) by mouth once daily. 30 tablet 11    DULoxetine (CYMBALTA) 60 MG capsule Take 1 capsule (60 mg total) by mouth 2 (two) times daily. 180 capsule 3    furosemide (LASIX) 40 MG tablet Take 1 tablet (40 mg total) by mouth 2 (two) times daily. 180 tablet 1    ibuprofen (ADVIL,MOTRIN) 800 MG tablet Take 1 tablet (800 mg total) by mouth 3 (three) times daily as needed for Pain. As needed 90 tablet 1    insulin (LANTUS SOLOSTAR U-100 INSULIN) glargine 100 units/mL SubQ pen Inject 30 Units into the skin every evening. 9 mL 5    lancets (LANCETS,ULTRA THIN) Misc 1 Lancet by Misc.(Non-Drug; Combo Route) route 2 (two) times a day. 200 each 11    lancing device Misc 1 Device by Misc.(Non-Drug; Combo Route) route 2 (two) times a day. 1 each 0    losartan (COZAAR) 50 MG tablet Take 1 tablet (50 mg  "total) by mouth once daily. 90 tablet 3    metFORMIN (GLUCOPHAGE) 1000 MG tablet Take 1 tablet (1,000 mg total) by mouth 2 (two) times daily. 180 tablet 1    montelukast (SINGULAIR) 10 mg tablet Take 1 tablet (10 mg total) by mouth every evening. 90 tablet 3    NOVOLOG FLEXPEN U-100 INSULIN 100 unit/mL (3 mL) InPn pen 6 u Ac + correction  Max TDD 54u 30 mL 11    oxybutynin (DITROPAN-XL) 5 MG TR24 Take 1 tablet (5 mg total) by mouth once daily. 90 tablet 3    oxyCODONE-acetaminophen (PERCOCET) 5-325 mg per tablet Take 1 tablet by mouth every 4 to 6 hours as needed for Pain. (Patient taking differently: Take 1 tablet by mouth every 4 to 6 hours as needed for Pain. 10mg) 27 tablet 0    pantoprazole (PROTONIX) 40 MG tablet Take 1 tablet (40 mg total) by mouth once daily. 90 tablet 3    pen needle, diabetic (BD KARYN 2ND GEN PEN NEEDLE) 32 gauge x 5/32" Ndle Checks bg 4 times a day 150 each 12    pregabalin (LYRICA) 150 MG capsule Take 1 capsule (150 mg total) by mouth 3 (three) times daily. 270 capsule 1    promethazine (PHENERGAN) 25 MG tablet Take 1 tablet (25 mg total) by mouth before meals as needed for Nausea. 30 tablet 1    rOPINIRole (REQUIP) 2 MG tablet Take 1 tablet (2 mg total) by mouth 3 (three) times daily. 270 tablet 3    rosuvastatin (CRESTOR) 20 MG tablet Take 1 tablet (20 mg total) by mouth every evening. 90 tablet 3    spironolactone (ALDACTONE) 50 MG tablet Take 1 tablet (50 mg total) by mouth once daily. 360 tablet 0    temazepam (RESTORIL) 30 mg capsule Take 1 capsule (30 mg total) by mouth nightly as needed for Insomnia. 30 capsule 2    tiZANidine (ZANAFLEX) 4 MG tablet Take 1 tablet (4 mg total) by mouth 3 (three) times daily. 90 tablet 3    TRUE METRIX GLUCOSE TEST STRIP Strp USE TO TEST BLOOD SUGAR TWICE DAILY 50 strip 1     No current facility-administered medications for this visit.       Past Medical History:  Past Medical History:   Diagnosis Date    Acute bacterial pericarditis 12/02/2017    " Anemia     Bacteremia due to methicillin resistant Staphylococcus aureus 2017    Diabetes mellitus     Encounter for blood transfusion     GERD (gastroesophageal reflux disease)     Heart murmur     History of pneumonia     History of UTI     Hypertension     Migraine headache     Sleep apnea     no machine yet    Type 2 diabetes mellitus without complication, with long-term current use of insulin 2017       Past Surgical History:  Past Surgical History:   Procedure Laterality Date    APPENDECTOMY      CATHETERIZATION OF BOTH LEFT AND RIGHT HEART N/A 2023    Procedure: Right and Left Heart Cath;  Surgeon: Osman Williamson MD;  Location: [facility];  Service: Cardiology;  Laterality: N/A;     SECTION      CHOLECYSTECTOMY      COLONOSCOPY N/A 2017    Procedure: COLONOSCOPY;  Surgeon: Juan Lepe MD;  Location: [facility];  Service: Endoscopy;  Laterality: N/A;    ESOPHAGOGASTRODUODENOSCOPY N/A 2019    Procedure: EGD (ESOPHAGOGASTRODUODENOSCOPY);  Surgeon: Gustavo Austin MD;  Location: [facility];  Service: Endoscopy;  Laterality: N/A;  with Push Enteroscopy    ESOPHAGOGASTRODUODENOSCOPY N/A 2019    Procedure: EGD (ESOPHAGOGASTRODUODENOSCOPY);  Surgeon: Gustavo Austin MD;  Location: [facility];  Service: Endoscopy;  Laterality: N/A;  Push enteroscopy    HYSTERECTOMY      IRRIGATION AND DEBRIDEMENT OF LUMBAR SPINE  2022    LUMBAR FUSION  2021    L4-L5       Family History:  family history includes Arrhythmia in her father; Heart disease in her father; Heart failure in her father; No Known Problems in her mother.    Social History:   reports that she has never smoked. She has never been exposed to tobacco smoke. She has never used smokeless tobacco. She reports that she does not drink alcohol.      REVIEW OF SYSTEMS:  As per HPI    PHYSICAL EXAM:  BP (!) 148/77 (BP Location: Right arm, Patient Position: Sitting, BP Method: Medium (Automatic))   Pulse 74    "Resp 14   Ht 5' 1" (1.549 m)   BMI 41.57 kg/m²     General: Well groomed. No acute distress. In WC.   Cardiovascular: Regular rate and rhythm.   Pulmonary: Normal effort and rate.   Musculoskeletal: No obvious joint deformities, moves all extremities well.  Extremities: No clubbing, cyanosis or edema.     Neurological exam:  Mental status: Awake and alert.  Oriented to person, place, time and situation. Recent and remote memory appear to be largely intact, recalls 2/3 words on DRRee  Fund of knowledge normal - current/past president, upcoming holiday. Decreased attention and concentration with serial subtraction, but able to spell backwards. MMSE 24/30.   Speech/Language: Fluent and appropriate. No dysarthria or aphasia on conversation. Able to follow complex commands.   Cranial nerves (II-XII): Visual fields full. Pupils equal round and reactive to light, extraocular movements intact, no ptosis, no nystagmus. Facial sensation and symmetry intact bilaterally. Hearing grossly intact. Palate mobile and midline. Shoulder shrug normal bilaterally. Normal tongue protrusion.   Motor: 5 out of 5 strength throughout the upper and lower extremities bilaterally. Normal bulk and tone. No abnormal movements noted. No drift appreciated.   Sensation: Intact to light touch, vibration and temperature bilaterally.   DTR: 2+ at the knees and biceps bilaterally except 1+ at the R knee. No clonus or Whittaker's bilaterally.   Coordination: Finger-nose-finger testing intact bilaterally. LIZBETH normal bilaterally. No tremor.   Gait: Slow, cautious. Antalgic.      DIAGNOSTIC DATA:  I have personally reviewed provider notes, labs and imaging made available to me today.     Imaging:  MRI brain w wo - pending    [facility] 11/2022: OSH  Clinical history is dizziness.     This is compared to a previous study from 1/25/2016.     There is mild atrophy. Ventricles of normal size and shape there is no shift of the midline noted. There are no extra-axial " fluid collections or areas consistent with hemorrhage noted. No masses are seen no acute infarcts are noted. The calvarium appears intact.     There is mild decreased attenuation in the periventricular white matter consistent with chronic ischemia.     Cardiac:  EKG 6/2022: NSR    Labs:  Lab Results   Component Value Date    WBC 7.06 04/16/2024    HGB 10.0 (L) 04/16/2024    HCT 31.1 (L) 04/16/2024     04/16/2024    MCV 78 (L) 04/16/2024    RDW 15.8 (H) 04/16/2024     Lab Results   Component Value Date     04/02/2024    K 3.0 (L) 04/02/2024     04/02/2024    CO2 28 04/02/2024    BUN 10 04/02/2024    CREATININE 1.1 04/02/2024     (H) 04/02/2024    CALCIUM 8.6 (L) 04/02/2024    MG 1.6 10/02/2023    PHOS 3.7 08/24/2018     Lab Results   Component Value Date    PROT 6.6 04/02/2024    ALBUMIN 3.1 (L) 04/02/2024    BILITOT 0.4 04/02/2024    AST 16 04/02/2024    ALKPHOS 138 (H) 04/02/2024    ALT 10 04/02/2024     Lab Results   Component Value Date    INR 1.2 11/21/2019     Lab Results   Component Value Date    CHOL 140 10/02/2023    HDL 36 (L) 10/02/2023    LDLCALC 69.8 10/02/2023    TRIG 171 (H) 10/02/2023    CHOLHDL 25.7 10/02/2023     Lab Results   Component Value Date    HGBA1C 7.2 (H) 10/02/2023      Lab Results   Component Value Date    CJEFFYAA14 421 04/02/2024     Lab Results   Component Value Date    FOLATE 4.8 04/16/2024     Lab Results   Component Value Date    TSH 1.087 04/02/2024     Component      Latest Ref Rng 4/2/2024   HIV 1/2 Ag/Ab      Non-reactive  Non-reactive    Hepatitis C Ab      Non-reactive  Non-reactive    RPR      Non-reactive  Non-reactive        Component      Latest Ref Rng 12/12/2019 2/27/2023 3/2/2023   Ferritin      20.0 - 300.0 ng/mL 10 (L)  21     CRP      0.00 - 0.90 mg/dL   1.40 (H)    Sed Rate      0 - 29 mm/Hr   53 (H)    HASEEB Screen      None Detected    None Detected    Lactate Dehydrogenase      110 - 260 U/L   191    Rheumatoid Factor      0.0 - 15.0  IU/mL   <8.6    Angio Convert Enzyme      16 - 85 U/L   65       Legend:  (L) Low  (H) High      ASSESSMENT & PLAN:  Amanda Barton is a 64 y.o. R-handed female seen in consultation for memory loss.     Problem List Items Addressed This Visit          Neuro    Restless leg syndrome    Current Assessment & Plan     Complicated / exacerbated by GERALDO -- f/u with hematology  DA dose too high, decrease to 4 mg / day, also on high dose Lyrica          Memory loss    Overview     MMSE  24/30 June 2024         Current Assessment & Plan     Etiology likely FND, but will evaluate for alternative causes, like neurodegenerative disease, epileptic events given age and medical history  MRI brain w wo is pending -- will reschedule for her today  Additional serologies as ordered  NP testing ordered - discussed waiting period  Routine EEG followed by 72 hr ambulatory EEG via [employer] to capture spells in question   Also discussed that polypharmacy is also a significant issue -- advise that daughter contact psych to reschedule appt and assist with med mgmt   She will continue not to drive in light of her symptoms             Psychiatric    Depression    Panic disorder       Oncology    Iron deficiency anemia - Primary     Other Visit Diagnoses       Memory loss of unknown cause        Transient neurological symptoms        Encephalopathy                Follow up: 2 months (after amb EEG completed)    I spent a total of 70 minutes on the day of the visit.    This includes face to face time with the patient, as well as non-face to face time preparing for and completing the visit (review of prior diagnostic testing and clinical notes, obtaining or reviewing history, documenting clinical information in the EMR, independently interpreting and communicating results to the patient/family and coordinating ongoing care).       I appreciate the opportunity to participate in the care of this patient. Please feel free to contact me with any  concerns or questions.       Sultana Jenkins, [facility]  [facility]  [address] [place] [address]

## 2024-06-19 NOTE — ASSESSMENT & PLAN NOTE
Etiology likely FND, but will evaluate for alternative causes, like neurodegenerative disease, epileptic events given age and medical history  MRI brain w wo is pending -- will reschedule for her today  Additional serologies as ordered  NP testing ordered - discussed waiting period  Routine EEG followed by 72 hr ambulatory EEG via [employer] to capture spells in question   Also discussed that polypharmacy is also a significant issue -- advise that daughter contact psych to reschedule appt and assist with med mgmt   She will continue not to drive in light of her symptoms

## 2024-06-19 NOTE — ASSESSMENT & PLAN NOTE
Complicated / exacerbated by GERALDO -- f/u with hematology  DA dose too high, decrease to 4 mg / day, also on high dose Lyrica

## 2024-06-19 NOTE — PATIENT INSTRUCTIONS
Plan:  Additional labs  EEG (seizure test) followed by in home 3 day EEG with a company called [facility] to evaluate for seizures   Neuropsychological testing -- cognitive test to help determine if issues are related to psychiatric factors vs alternatives -- ordered in April, should be contacted to complete this sometime in late summer / early Fall    In the meantime, you should contact the psychiatry department to reschedule your appointment with them to help with medication and grief management    The max recommended daily dose of Requip is 4 mg -- I recommend that you eliminate the AM dose of this to start, as taking too much of this can actually worsen RLS symptoms. The RLS is closely related to the iron deficiency, so hopefully infusions will help.     Follow up with me in about 2 months (after the in home EEG has been completed)    Please call our clinic at [phone] or send a message on the [employer] portal if there are any changes to the plan discussed today. For example, if you are not contacted for the requested tests, referral(s) within one week, if you are unable to receive the medications prescribed, or if you feel you need to change the treatment course for any reason.

## 2024-06-25 LAB — METHYLMALONATE SERPL-SCNC: 0.21 UMOL/L

## 2024-06-26 ENCOUNTER — OFFICE VISIT (OUTPATIENT)
Dept: ENDOCRINOLOGY | Facility: CLINIC | Age: 64
End: 2024-06-26
Payer: MEDICARE

## 2024-06-26 ENCOUNTER — TELEPHONE (OUTPATIENT)
Dept: HEMATOLOGY/ONCOLOGY | Facility: CLINIC | Age: 64
End: 2024-06-26
Payer: MEDICARE

## 2024-06-26 VITALS
HEART RATE: 74 BPM | DIASTOLIC BLOOD PRESSURE: 76 MMHG | BODY MASS INDEX: 41.16 KG/M2 | SYSTOLIC BLOOD PRESSURE: 138 MMHG | HEIGHT: 61 IN | WEIGHT: 218 LBS

## 2024-06-26 DIAGNOSIS — R80.9 TYPE 2 DIABETES MELLITUS WITH MICROALBUMINURIA, WITH LONG-TERM CURRENT USE OF INSULIN: Primary | ICD-10-CM

## 2024-06-26 DIAGNOSIS — I10 HYPERTENSION, UNSPECIFIED TYPE: ICD-10-CM

## 2024-06-26 DIAGNOSIS — Z79.4 TYPE 2 DIABETES MELLITUS WITH MICROALBUMINURIA, WITH LONG-TERM CURRENT USE OF INSULIN: Primary | ICD-10-CM

## 2024-06-26 DIAGNOSIS — E11.29 TYPE 2 DIABETES MELLITUS WITH MICROALBUMINURIA, WITH LONG-TERM CURRENT USE OF INSULIN: Primary | ICD-10-CM

## 2024-06-26 DIAGNOSIS — E66.01 CLASS 2 SEVERE OBESITY WITH SERIOUS COMORBIDITY AND BODY MASS INDEX (BMI) OF 38.0 TO 38.9 IN ADULT, UNSPECIFIED OBESITY TYPE: ICD-10-CM

## 2024-06-26 DIAGNOSIS — D50.0 IRON DEFICIENCY ANEMIA DUE TO CHRONIC BLOOD LOSS: Primary | ICD-10-CM

## 2024-06-26 DIAGNOSIS — E78.5 HYPERLIPIDEMIA, UNSPECIFIED HYPERLIPIDEMIA TYPE: ICD-10-CM

## 2024-06-26 PROCEDURE — 3008F BODY MASS INDEX DOCD: CPT | Mod: CPTII,S$GLB,, | Performed by: NURSE PRACTITIONER

## 2024-06-26 PROCEDURE — 3288F FALL RISK ASSESSMENT DOCD: CPT | Mod: CPTII,S$GLB,, | Performed by: NURSE PRACTITIONER

## 2024-06-26 PROCEDURE — 4010F ACE/ARB THERAPY RXD/TAKEN: CPT | Mod: CPTII,S$GLB,, | Performed by: NURSE PRACTITIONER

## 2024-06-26 PROCEDURE — 3075F SYST BP GE 130 - 139MM HG: CPT | Mod: CPTII,S$GLB,, | Performed by: NURSE PRACTITIONER

## 2024-06-26 PROCEDURE — 1159F MED LIST DOCD IN RCRD: CPT | Mod: CPTII,S$GLB,, | Performed by: NURSE PRACTITIONER

## 2024-06-26 PROCEDURE — 99999 PR PBB SHADOW E&M-EST. PATIENT-LVL V: CPT | Mod: PBBFAC,,, | Performed by: NURSE PRACTITIONER

## 2024-06-26 PROCEDURE — 99214 OFFICE O/P EST MOD 30 MIN: CPT | Mod: S$GLB,,, | Performed by: NURSE PRACTITIONER

## 2024-06-26 PROCEDURE — 1101F PT FALLS ASSESS-DOCD LE1/YR: CPT | Mod: CPTII,S$GLB,, | Performed by: NURSE PRACTITIONER

## 2024-06-26 PROCEDURE — 3078F DIAST BP <80 MM HG: CPT | Mod: CPTII,S$GLB,, | Performed by: NURSE PRACTITIONER

## 2024-06-26 NOTE — TELEPHONE ENCOUNTER
Patient showed up to clinic today instead of 7/1 for office visit with Ochoa Ruiz NP. States that someone told her to come today for 11:00. No documentation in chart as to who told her to do so. Patient states that she can not make the appointment on 7/1 and would like to reschedule for 7/3. Appointment r/s for 11:30 am on 7/3. ISIDORO Platt spoke with patient regarding appointment change and patient will bring a copy of lab results from [place] with her to this appointment.

## 2024-06-26 NOTE — PROGRESS NOTES
"Subjective     Patient ID: Amanda Barton is a 64 y.o. female.    Chief Complaint: Diabetes    HPI Pt is a 64 y.o. wf  with a diagnosis of Type 2 diabetes mellitus diagnosed in 1990's , as well as chronic conditions pending review including HLP, HTN, BJ, CHF, .  Other pertinent medical and social information noted includes, but not limited to: significant neurocognitive decline after death of granddaughter, limited mobility r/t complicated back surgery. .       Interim Events: June 26, 2024:  Pt of [facility] Sreekanth, new to me--thought she was seeing fina.  Hasn't used dexcom in 3 weeks out of supplies.  Is checking glucoses. 15 y/o GD present.   Reports  today--194 yesterday  States occassional nocturnal hypoglycmeia  (daughter joined on phone, states 1-3am--and has been 40-50's.  She keeps candy by BS.   Daughter states glucoses generally 280 during the day, including prelunch and supper.      Current DM meds:   Lantus 30 qhs, Novolog 6-6-6  plus ss, metformin 1000 bid        Failed DM meds:  na        Back up plan for insulin pump hiatus:   Statin: rosuvastatin 20 mg        Not tolerated statin : na   ACE/ARB:losartan 50 mg        Not tolerated ACE/ARB: na   Known Diabetic complications: circulatory,         Passwords for Tech Accounts: na       Review of Systems   Constitutional:  Negative for activity change and fatigue.        Blood pressure 138/76, pulse 74, height 5' 1" (1.549 m), weight 98.9 kg (218 lb).   HENT:  Negative for hearing loss and trouble swallowing.    Eyes:  Negative for photophobia and visual disturbance.        Last Eye Exam:    Respiratory:  Negative for cough and shortness of breath.    Cardiovascular:  Negative for chest pain and palpitations.   Gastrointestinal:  Negative for constipation and diarrhea.   Genitourinary:  Negative for frequency and urgency.   Musculoskeletal:  Positive for back pain and gait problem. Negative for arthralgias and myalgias.   Integumentary:  Negative for " "rash and wound.   Neurological:  Negative for weakness and numbness.   Psychiatric/Behavioral:  Negative for sleep disturbance. The patient is not nervous/anxious.           Objective     Physical Exam  Constitutional:       Appearance: Normal appearance. She is obese.   HENT:      Head: Normocephalic and atraumatic.      Nose: Nose normal.      Mouth/Throat:      Mouth: Mucous membranes are moist.      Pharynx: Oropharynx is clear.   Eyes:      Extraocular Movements: Extraocular movements intact.      Conjunctiva/sclera: Conjunctivae normal.      Pupils: Pupils are equal, round, and reactive to light.   Cardiovascular:      Rate and Rhythm: Normal rate and regular rhythm.      Pulses: Normal pulses.      Heart sounds: Normal heart sounds.   Pulmonary:      Effort: Pulmonary effort is normal.      Breath sounds: Normal breath sounds.   Musculoskeletal:         General: Normal range of motion.      Cervical back: Normal range of motion and neck supple.      Right lower leg: No edema.      Left lower leg: No edema.      Comments: Feet: no open wounds or calluses. Fair pedal care. Pedal pulses +2 bilaterally  Sensation via monofilament 5/5 bilaterally    Lymphadenopathy:      Cervical: No cervical adenopathy.   Skin:     General: Skin is warm and dry.   Neurological:      General: No focal deficit present.      Mental Status: She is alert and oriented to person, place, and time.   Psychiatric:         Mood and Affect: Mood normal.         Behavior: Behavior normal.         Thought Content: Thought content normal.         Judgment: Judgment normal.              /76 (BP Location: Right arm, Patient Position: Sitting, BP Method: Large (Manual))   Pulse 74   Ht 5' 1" (1.549 m)   Wt 98.9 kg (218 lb)   BMI 41.19 kg/m²     Hemoglobin A1C   Date Value Ref Range Status   10/02/2023 7.2 (H) 4.0 - 5.6 % Final     Comment:     ADA Screening Guidelines:  5.7-6.4%  Consistent with prediabetes  >or=6.5%  Consistent with " diabetes    High levels of fetal hemoglobin interfere with the HbA1C  assay. Heterozygous hemoglobin variants (HbS, HgC, etc)do  not significantly interfere with this assay.   However, presence of multiple variants may affect accuracy.     07/27/2023 6.7 (H) 4.0 - 5.6 % Final     Comment:     ADA Screening Guidelines:  5.7-6.4%  Consistent with prediabetes  >or=6.5%  Consistent with diabetes    High levels of fetal hemoglobin interfere with the HbA1C  assay. Heterozygous hemoglobin variants (HbS, HgC, etc)do  not significantly interfere with this assay.   However, presence of multiple variants may affect accuracy.     05/15/2023 7.8 (H) 4.0 - 5.6 % Final     Comment:     ADA Screening Guidelines:  5.7-6.4%  Consistent with prediabetes  >or=6.5%  Consistent with diabetes    High levels of fetal hemoglobin interfere with the HbA1C  assay. Heterozygous hemoglobin variants (HbS, HgC, etc)do  not significantly interfere with this assay.   However, presence of multiple variants may affect accuracy.         Chemistry        Component Value Date/Time     04/02/2024 0912    K 3.0 (L) 04/02/2024 0912     04/02/2024 0912    CO2 28 04/02/2024 0912    BUN 10 04/02/2024 0912    CREATININE 1.1 04/02/2024 0912     (H) 04/02/2024 0912        Component Value Date/Time    CALCIUM 8.6 (L) 04/02/2024 0912    ALKPHOS 138 (H) 04/02/2024 0912    AST 16 04/02/2024 0912    ALT 10 04/02/2024 0912    BILITOT 0.4 04/02/2024 0912    ESTGFRAFRICA >60 11/21/2019 0526    EGFRNONAA >60 11/21/2019 0526          Lab Results   Component Value Date    CHOL 140 10/02/2023     Lab Results   Component Value Date    HDL 36 (L) 10/02/2023     Lab Results   Component Value Date    LDLCALC 69.8 10/02/2023     Lab Results   Component Value Date    TRIG 171 (H) 10/02/2023     Lab Results   Component Value Date    CHOLHDL 25.7 10/02/2023     Lab Results   Component Value Date    MICALBCREAT 94.1 (H) 02/16/2023     Lab Results   Component Value  "Date    TSH 1.087 04/02/2024     No results found for: "[id number]"    Assessment and Plan     1. Type 2 diabetes mellitus with microalbuminuria, with long-term current use of insulin  Comprehensive Metabolic Panel    Lipid Panel    Microalbumin/Creatinine Ratio, Urine    Hemoglobin A1C      2. Hyperlipidemia, unspecified hyperlipidemia type        3. Hypertension, unspecified type        4. Class 2 severe obesity with serious comorbidity and body mass index (BMI) of 38.0 to 38.9 in adult, unspecified obesity type          PLAN  Cont lantus 30  Increase novolog 6-6-6 to 8-8-6 plus ss 1:25      ORDERS 06/26/2024   Virtual with me in 4 weeks --dexcom sharing.      F/u Bhavana in 4 mo with fasting A1c, cmp, lipids, and urine m/c         "

## 2024-06-26 NOTE — PATIENT INSTRUCTIONS
Cont 30 lantus at bedtime    Increase breakfast and lunch dose of Novolog from 6-6-6 to 8-8-6       Novolog/or Humalog/or Apridra  USE ONLY    Dose is based on level of glucose before meals only (meaning no food or drink for 4 hours). This dose may be added to a standard meal dose if ordered    150-200=+2  201-250=+4  251-300=+6  301-350=+8  351-400=+10         If you need sliding scale at bedtime--only take 1/2 of recommended dose.

## 2024-06-28 ENCOUNTER — TELEPHONE (OUTPATIENT)
Dept: PRIMARY CARE CLINIC | Facility: CLINIC | Age: 64
End: 2024-06-28
Payer: MEDICARE

## 2024-06-28 NOTE — TELEPHONE ENCOUNTER
----- Message from Stephany Luna sent at 6/28/2024 10:44 AM CDT -----  Type: Needs Medical Advice  Who Called:  Susanne Montero   Best Call Back Number: [phone]  Additional Information: Susanne stated she sent over a chart review for pts rx and has yet to receive the needed info back, Susanne stated request was sent on 05/30 and 06/27 to no avail and she would like a stated update please ensure to advise and give status update asa thanks!  Fax:  [phone]

## 2024-07-02 NOTE — PROGRESS NOTES
PATIENT: Amanda Barton  MRN: [id number]  DATE: 7/3/2024      Diagnosis:   1. Iron deficiency anemia due to chronic blood loss    2. Type 2 diabetes mellitus without complication, with long-term current use of insulin    3. Class 2 severe obesity with serious comorbidity and body mass index (BMI) of 38.0 to 38.9 in adult, unspecified obesity type        Chief Complaint: Iron Deficiency anemia          Subjective:    Interval History: Ms. Barton is a 64 y.o. female who returns for follow up for GERALDO. She was previously seen by Dr Diamond in February 2023 and treated with 1000mg IV iron completed on 5/25/2023. Colonoscopy in 2017 no evidence of bleed. An upper GI in 2019 revealed gastritis, and was negative for H.Pylori. Cytoscopy in Apr. 2024 was WNL. Pt reports recently being prescribed magnesium 400 mg bid, and Calcium 100mg daily by cardiology. She has not started calcium. Pt received 2 iv iron infusions at [place], last given 2 weeks ago. Our clinic has been consulted for further review.     Prior History:   62-year-old female with history of paroxysmal atrial fibrillation, pericarditis with prolonged hospitalization in 2018, NSTEMI who presents to us as a new patient in hematology clinic on 2/27/23.     Regarding her anemia, she is had longstanding profound anemia dating back to 2018.  She is never had a hemoglobin greater than 10.   She is had consistent microcytosis with a MCV between 78 and 80.  RDW is markedly elevated up to 18-19.     Regarding blood losses she denies hematochezia, melena, hemoptysis, epistaxis, reports she had a hysterectomy many years ago so no vaginal bleeding.  She had a colonoscopy/EGD approximately 3 weeks ago (Feb 2023) with no etiology of anemia.  Gastroenterologist recommended PillCam which they plan to complete after she has workup for chronic cough.     She reports she has numerous medical problems and is seeing a number of subspecialists including a endocrinologist,  cardiologist, and pulmonologist.  She says she has profound fatigue and feels tired all the time.  She has a chronic cough that has been going on for weeks which is distressing to her, her daughter reports she is had an extensive workup for the cough without a diagnosis, she is scheduled to see a pulmonologist for further workup soon.  Oncology History    No history exists.       Past Medical History:   Past Medical History:   Diagnosis Date    Acute bacterial pericarditis 2017    Anemia     Bacteremia due to methicillin resistant Staphylococcus aureus 2017    Diabetes mellitus     Encounter for blood transfusion     GERD (gastroesophageal reflux disease)     Heart murmur     History of pneumonia     History of UTI     Hypertension     Migraine headache     Sleep apnea     no machine yet    Type 2 diabetes mellitus without complication, with long-term current use of insulin 2017       Past Surgical HIstory:   Past Surgical History:   Procedure Laterality Date    APPENDECTOMY      CATHETERIZATION OF BOTH LEFT AND RIGHT HEART N/A 2023    Procedure: Right and Left Heart Cath;  Surgeon: Osman Williamson MD;  Location: [facility] CATH;  Service: Cardiology;  Laterality: N/A;     SECTION      CHOLECYSTECTOMY      COLONOSCOPY N/A 2017    Procedure: COLONOSCOPY;  Surgeon: Juan Lepe MD;  Location: [facility];  Service: Endoscopy;  Laterality: N/A;    ESOPHAGOGASTRODUODENOSCOPY N/A 2019    Procedure: EGD (ESOPHAGOGASTRODUODENOSCOPY);  Surgeon: Gustavo Austin MD;  Location: [facility];  Service: Endoscopy;  Laterality: N/A;  with Push Enteroscopy    ESOPHAGOGASTRODUODENOSCOPY N/A 2019    Procedure: EGD (ESOPHAGOGASTRODUODENOSCOPY);  Surgeon: Gustavo Austin MD;  Location: [facility];  Service: Endoscopy;  Laterality: N/A;  Push enteroscopy    HYSTERECTOMY      IRRIGATION AND DEBRIDEMENT OF LUMBAR SPINE  2022    LUMBAR FUSION  2021    L4-L5       Family History:    Family History   Problem Relation Name Age of Onset    No Known Problems Mother      Heart disease Father      Heart failure Father      Arrhythmia Father         Social History:  reports that she has never smoked. She has never been exposed to tobacco smoke. She has never used smokeless tobacco. She reports that she does not drink alcohol.    Allergies:  Review of patient's allergies indicates:   Allergen Reactions    Ciprofloxacin Nausea And Vomiting       Medications:  Current Outpatient Medications   Medication Sig Dispense Refill    albuterol (VENTOLIN HFA) 90 mcg/actuation inhaler Inhale 2 puffs into the lungs every 6 (six) hours as needed for Wheezing (Rescue Inhaler). Rescue 6.7 g 11    ALPRAZolam (XANAX) 1 MG tablet TAKE 1 TABLET (1 MG TOTAL) BY MOUTH 2 (TWO) TIMES DAILY. 60 tablet 0    blood-glucose meter kit Use as instructed 1 each 0    blood-glucose meter,continuous (DEXCOM G6 ) Misc Use as directed (Patient not taking: Reported on 6/26/2024) 1 each 0    blood-glucose sensor (DEXCOM G6 SENSOR) Cristiana Use as directed 3 each 12    blood-glucose transmitter (DEXCOM G6 TRANSMITTER) Cristiana Use as directed 1 each 3    buPROPion (WELLBUTRIN XL) 150 MG TB24 tablet Take 1 tablet (150 mg total) by mouth once daily. 90 tablet 0    carvediloL (COREG) 3.125 MG tablet Take 1 tablet (3.125 mg total) by mouth 2 (two) times daily with meals. (Patient taking differently: Take 6.25 mg by mouth 2 (two) times daily with meals.) 180 tablet 3    desloratadine (CLARINEX) 5 mg tablet Take 1 tablet (5 mg total) by mouth once daily. 30 tablet 11    DULoxetine (CYMBALTA) 60 MG capsule Take 1 capsule (60 mg total) by mouth 2 (two) times daily. 180 capsule 3    furosemide (LASIX) 40 MG tablet Take 1 tablet (40 mg total) by mouth 2 (two) times daily. 180 tablet 1    ibuprofen (ADVIL,MOTRIN) 800 MG tablet Take 1 tablet (800 mg total) by mouth 3 (three) times daily as needed for Pain. As needed 90 tablet 1    insulin (LANTUS  "SOLOSTAR U-100 INSULIN) glargine 100 units/mL SubQ pen Inject 30 Units into the skin every evening. 9 mL 5    lancets (LANCETS,ULTRA THIN) Misc 1 Lancet by Misc.(Non-Drug; Combo Route) route 2 (two) times a day. 200 each 11    lancing device Misc 1 Device by Misc.(Non-Drug; Combo Route) route 2 (two) times a day. 1 each 0    losartan (COZAAR) 50 MG tablet Take 1 tablet (50 mg total) by mouth once daily. 90 tablet 3    metFORMIN (GLUCOPHAGE) 1000 MG tablet Take 1 tablet (1,000 mg total) by mouth 2 (two) times daily. 180 tablet 1    montelukast (SINGULAIR) 10 mg tablet Take 1 tablet (10 mg total) by mouth every evening. 90 tablet 3    NOVOLOG FLEXPEN U-100 INSULIN 100 unit/mL (3 mL) InPn pen 6 u Ac + correction  Max TDD 54u 30 mL 11    oxybutynin (DITROPAN-XL) 5 MG TR24 Take 1 tablet (5 mg total) by mouth once daily. 90 tablet 3    oxyCODONE-acetaminophen (PERCOCET) 5-325 mg per tablet Take 1 tablet by mouth every 4 to 6 hours as needed for Pain. (Patient taking differently: Take 1 tablet by mouth every 4 to 6 hours as needed for Pain. 10mg) 27 tablet 0    pantoprazole (PROTONIX) 40 MG tablet Take 1 tablet (40 mg total) by mouth once daily. 90 tablet 3    pen needle, diabetic (BD KARYN 2ND GEN PEN NEEDLE) 32 gauge x 5/32" Ndle Checks bg 4 times a day 150 each 12    pregabalin (LYRICA) 150 MG capsule Take 1 capsule (150 mg total) by mouth 3 (three) times daily. 270 capsule 1    promethazine (PHENERGAN) 25 MG tablet Take 1 tablet (25 mg total) by mouth before meals as needed for Nausea. 30 tablet 1    rOPINIRole (REQUIP) 2 MG tablet Take 1 tablet (2 mg total) by mouth 3 (three) times daily. 270 tablet 3    rosuvastatin (CRESTOR) 20 MG tablet Take 1 tablet (20 mg total) by mouth every evening. 90 tablet 3    spironolactone (ALDACTONE) 50 MG tablet Take 1 tablet (50 mg total) by mouth once daily. 360 tablet 0    temazepam (RESTORIL) 30 mg capsule Take 1 capsule (30 mg total) by mouth nightly as needed for Insomnia. 30 " capsule 2    tiZANidine (ZANAFLEX) 4 MG tablet Take 1 tablet (4 mg total) by mouth 3 (three) times daily. 90 tablet 3    TRUE METRIX GLUCOSE TEST STRIP Strp USE TO TEST BLOOD SUGAR TWICE DAILY 50 strip 1     No current facility-administered medications for this visit.       Review of Systems   Constitutional:  Negative for appetite change, chills and fever.   Respiratory:  Negative for shortness of breath.    Cardiovascular:  Negative for chest pain, palpitations and leg swelling.   Gastrointestinal:  Negative for blood in stool.   Genitourinary:  Negative for hematuria.   Musculoskeletal:  Positive for myalgias. Joint swelling: lower extremities.  Neurological:  Negative for light-headedness and headaches.   Hematological:  Negative for adenopathy. Does not bruise/bleed easily.   Psychiatric/Behavioral: Negative.         ECOG Performance Status:   ECOG SCORE             Objective:      Vitals: There were no vitals filed for this visit.  BMI: There is no height or weight on file to calculate BMI.    Physical Exam  HENT:      Head: Normocephalic.      Nose: Nose normal.      Mouth/Throat:      Mouth: Mucous membranes are moist.      Pharynx: Oropharynx is clear.   Eyes:      Pupils: Pupils are equal, round, and reactive to light.   Cardiovascular:      Rate and Rhythm: Normal rate and regular rhythm.      Heart sounds: Normal heart sounds.   Pulmonary:      Effort: Pulmonary effort is normal.      Breath sounds: Normal breath sounds.   Abdominal:      General: Bowel sounds are normal.   Musculoskeletal:         General: Normal range of motion.      Cervical back: Normal range of motion.   Skin:     General: Skin is warm and dry.   Neurological:      Mental Status: She is alert and oriented to person, place, and time.   Psychiatric:         Mood and Affect: Mood normal.         Behavior: Behavior normal.       Laboratory Data:  No results found for this or any previous visit (from the past 24 hour(s)).       Imaging:  Cystoscopy     Date/Time: 4/5/2024 8:00 AM     Blood Loss:  None     The patients clinic history and physical were reviewed and there are no changes.     The patient was placed in the frog-leg position.  The genitals were prepped and draped in a sterile fashion.   Using a flexible scope, a careful cystoscopic exam was then performed.  The entire bladder mucosa was systematically visualized.  The mucosa appeared normal.  There were no lesions, masses foreign bodies or stones.   Each ureteral orifices were visualized and both had clear efflux of urine.  On retroflexion the bladder neck appeared normal.  The cystoscope was then removed and I examined the entire length of the urethra.  The urethra appeared normal.  She tolerated the procedure well.  There were no complications.  Atrophic vaginitis noted on exam with vulvar erythema.     Impression:  Normal cystoscopy.  Recommend gyn evaluation      Assessment:       1. Iron deficiency anemia due to chronic blood loss    2. Type 2 diabetes mellitus without complication, with long-term current use of insulin    3. Class 2 severe obesity with serious comorbidity and body mass index (BMI) of 38.0 to 38.9 in adult, unspecified obesity type           Plan:    iron-deficiency anemia  -she is had longstanding profound anemia dating back to 2018.  She is never had a hemoglobin greater than 10.  She ihad consistent microcytosis with MCV between 78 and 80.  RDW is markedly elevated up to 18-19.  -her last iron studies were 3 years ago with a ferritin of 10, confirming iron-deficiency anemia  -last colonoscopy and EGD patient reports was 3 weeks ago (outside [facility]) was relatively normal with no source of blood loss identified,gastroenterologist recommended PillCam which they plan on completing once they workup her chronic cough  -patient reports she has previously been on oral iron and is unable to tolerate this due to GI side effects including vomiting  -she needs rapid  replacement of her iron, I think this is best suited with IV iron supplementation, calculation of her iron deficit shows she has approximately a 1400mg deficit.  Will obtain complete iron studies today and replace 1000 mg to start- venofer 200mg x 5 infusions, patient is agreeable to this plan, return to clinic in 2 months w/repeat labs.   7/3/2024: Will repeat cbc, cmp, ferritin, iron studies, stfr today.   Will follow up with results with possible IV infusion.  F/u in 6 weeks with repeat labs at least one day prior.      # type 2 diabetes  -on Lantus, managed by primary care     # obesity with BMI of 38  -with complication, type 2 diabetes and hypertension    Visit today included increased complexity associated with the care of the episodic problem  addressed and managing the longitudinal care of the patient due to the serious and/or complex managed problem(s) anemia.       Med Onc Chart Routing      Follow up with physician    Follow up with IZZY 6 weeks. with repeat cbc, cmp, ferritin at least 1 day prior   Infusion scheduling note    Injection scheduling note    Labs    Imaging    Pharmacy appointment    Other referrals                  Plan was discussed with the patient at length, and she verbalized understanding. Amanda was given an opportunity to ask questions that were answered to her satisfaction, and she was advised to call in the interval if any problems or questions arise.    Assessment/Plan reviewed and approved by Dr Diamond    30 minutes were spent in coordination of patient's care, record review and counseling.    BRYAN Lozoya, FNP-C  Hematology & Oncology

## 2024-07-03 ENCOUNTER — PATIENT MESSAGE (OUTPATIENT)
Dept: PRIMARY CARE CLINIC | Facility: CLINIC | Age: 64
End: 2024-07-03
Payer: MEDICARE

## 2024-07-03 ENCOUNTER — LAB VISIT (OUTPATIENT)
Dept: LAB | Facility: HOSPITAL | Age: 64
End: 2024-07-03
Payer: MEDICARE

## 2024-07-03 ENCOUNTER — OFFICE VISIT (OUTPATIENT)
Dept: HEMATOLOGY/ONCOLOGY | Facility: CLINIC | Age: 64
End: 2024-07-03
Payer: MEDICARE

## 2024-07-03 VITALS
HEART RATE: 79 BPM | SYSTOLIC BLOOD PRESSURE: 144 MMHG | HEIGHT: 61 IN | OXYGEN SATURATION: 99 % | DIASTOLIC BLOOD PRESSURE: 83 MMHG | TEMPERATURE: 97 F | RESPIRATION RATE: 16 BRPM | BODY MASS INDEX: 39.95 KG/M2 | WEIGHT: 211.63 LBS

## 2024-07-03 DIAGNOSIS — D50.0 IRON DEFICIENCY ANEMIA DUE TO CHRONIC BLOOD LOSS: Primary | ICD-10-CM

## 2024-07-03 DIAGNOSIS — E66.01 CLASS 2 SEVERE OBESITY WITH SERIOUS COMORBIDITY AND BODY MASS INDEX (BMI) OF 38.0 TO 38.9 IN ADULT, UNSPECIFIED OBESITY TYPE: ICD-10-CM

## 2024-07-03 DIAGNOSIS — Z79.4 TYPE 2 DIABETES MELLITUS WITHOUT COMPLICATION, WITH LONG-TERM CURRENT USE OF INSULIN: ICD-10-CM

## 2024-07-03 DIAGNOSIS — E11.9 TYPE 2 DIABETES MELLITUS WITHOUT COMPLICATION, WITH LONG-TERM CURRENT USE OF INSULIN: ICD-10-CM

## 2024-07-03 DIAGNOSIS — N39.0 URINARY TRACT INFECTION WITHOUT HEMATURIA, SITE UNSPECIFIED: ICD-10-CM

## 2024-07-03 DIAGNOSIS — D50.0 IRON DEFICIENCY ANEMIA DUE TO CHRONIC BLOOD LOSS: ICD-10-CM

## 2024-07-03 LAB
ALBUMIN SERPL BCP-MCNC: 3.3 G/DL (ref 3.5–5.2)
ALP SERPL-CCNC: 112 U/L (ref 55–135)
ALT SERPL W/O P-5'-P-CCNC: 19 U/L (ref 10–44)
ANION GAP SERPL CALC-SCNC: 11 MMOL/L (ref 8–16)
AST SERPL-CCNC: 21 U/L (ref 10–40)
BACTERIA #/AREA URNS HPF: ABNORMAL /HPF
BASOPHILS # BLD AUTO: 0.05 K/UL (ref 0–0.2)
BASOPHILS NFR BLD: 0.7 % (ref 0–1.9)
BILIRUB SERPL-MCNC: 0.3 MG/DL (ref 0.1–1)
BILIRUB UR QL STRIP: NEGATIVE
BUN SERPL-MCNC: 9 MG/DL (ref 8–23)
CALCIUM SERPL-MCNC: 8.5 MG/DL (ref 8.7–10.5)
CHLORIDE SERPL-SCNC: 111 MMOL/L (ref 95–110)
CLARITY UR: CLEAR
CO2 SERPL-SCNC: 21 MMOL/L (ref 23–29)
COLOR UR: YELLOW
CREAT SERPL-MCNC: 0.9 MG/DL (ref 0.5–1.4)
DIFFERENTIAL METHOD BLD: ABNORMAL
EOSINOPHIL # BLD AUTO: 0.1 K/UL (ref 0–0.5)
EOSINOPHIL NFR BLD: 1.9 % (ref 0–8)
ERYTHROCYTE [DISTWIDTH] IN BLOOD BY AUTOMATED COUNT: 20.1 % (ref 11.5–14.5)
EST. GFR  (NO RACE VARIABLE): >60 ML/MIN/1.73 M^2
FERRITIN SERPL-MCNC: 847 NG/ML (ref 20–300)
GLUCOSE SERPL-MCNC: 166 MG/DL (ref 70–110)
GLUCOSE UR QL STRIP: NEGATIVE
HCT VFR BLD AUTO: 35.4 % (ref 37–48.5)
HGB BLD-MCNC: 11.4 G/DL (ref 12–16)
HGB UR QL STRIP: NEGATIVE
IMM GRANULOCYTES # BLD AUTO: 0.01 K/UL (ref 0–0.04)
IMM GRANULOCYTES NFR BLD AUTO: 0.1 % (ref 0–0.5)
IRON SERPL-MCNC: 66 UG/DL (ref 30–160)
KETONES UR QL STRIP: NEGATIVE
LEUKOCYTE ESTERASE UR QL STRIP: ABNORMAL
LYMPHOCYTES # BLD AUTO: 1.4 K/UL (ref 1–4.8)
LYMPHOCYTES NFR BLD: 19.6 % (ref 18–48)
MCH RBC QN AUTO: 27.3 PG (ref 27–31)
MCHC RBC AUTO-ENTMCNC: 32.2 G/DL (ref 32–36)
MCV RBC AUTO: 85 FL (ref 82–98)
MICROSCOPIC COMMENT: ABNORMAL
MONOCYTES # BLD AUTO: 0.5 K/UL (ref 0.3–1)
MONOCYTES NFR BLD: 6.7 % (ref 4–15)
NEUTROPHILS # BLD AUTO: 5 K/UL (ref 1.8–7.7)
NEUTROPHILS NFR BLD: 71 % (ref 38–73)
NITRITE UR QL STRIP: NEGATIVE
NRBC BLD-RTO: 0 /100 WBC
PH UR STRIP: 6 [PH] (ref 5–8)
PLATELET # BLD AUTO: 223 K/UL (ref 150–450)
PMV BLD AUTO: 10.7 FL (ref 9.2–12.9)
POTASSIUM SERPL-SCNC: 3.7 MMOL/L (ref 3.5–5.1)
PROT SERPL-MCNC: 6.7 G/DL (ref 6–8.4)
PROT UR QL STRIP: ABNORMAL
RBC # BLD AUTO: 4.18 M/UL (ref 4–5.4)
SATURATED IRON: 21 % (ref 20–50)
SODIUM SERPL-SCNC: 143 MMOL/L (ref 136–145)
SP GR UR STRIP: >=1.03 (ref 1–1.03)
SQUAMOUS #/AREA URNS HPF: 5 /HPF
TOTAL IRON BINDING CAPACITY: 318 UG/DL (ref 250–450)
TRANSFERRIN SERPL-MCNC: 215 MG/DL (ref 200–375)
URN SPEC COLLECT METH UR: ABNORMAL
WBC # BLD AUTO: 7 K/UL (ref 3.9–12.7)
WBC #/AREA URNS HPF: 18 /HPF (ref 0–5)

## 2024-07-03 PROCEDURE — 80053 COMPREHEN METABOLIC PANEL: CPT | Mod: PN

## 2024-07-03 PROCEDURE — 82728 ASSAY OF FERRITIN: CPT

## 2024-07-03 PROCEDURE — 85025 COMPLETE CBC W/AUTO DIFF WBC: CPT | Mod: PN

## 2024-07-03 PROCEDURE — 84466 ASSAY OF TRANSFERRIN: CPT

## 2024-07-03 PROCEDURE — 87086 URINE CULTURE/COLONY COUNT: CPT

## 2024-07-03 PROCEDURE — 81000 URINALYSIS NONAUTO W/SCOPE: CPT | Mod: PN

## 2024-07-03 PROCEDURE — 36415 COLL VENOUS BLD VENIPUNCTURE: CPT | Mod: PN

## 2024-07-03 PROCEDURE — 84238 ASSAY NONENDOCRINE RECEPTOR: CPT

## 2024-07-03 PROCEDURE — 87077 CULTURE AEROBIC IDENTIFY: CPT

## 2024-07-03 PROCEDURE — 99999 PR PBB SHADOW E&M-EST. PATIENT-LVL V: CPT | Mod: PBBFAC,,,

## 2024-07-03 PROCEDURE — 87186 SC STD MICRODIL/AGAR DIL: CPT

## 2024-07-03 PROCEDURE — 87088 URINE BACTERIA CULTURE: CPT

## 2024-07-03 RX ORDER — LANOLIN ALCOHOL/MO/W.PET/CERES
400 CREAM (GRAM) TOPICAL 2 TIMES DAILY
COMMUNITY

## 2024-07-04 ENCOUNTER — PATIENT MESSAGE (OUTPATIENT)
Dept: ENDOCRINOLOGY | Facility: CLINIC | Age: 64
End: 2024-07-04
Payer: MEDICARE

## 2024-07-05 LAB — BACTERIA UR CULT: ABNORMAL

## 2024-07-06 LAB — STFR SERPL-MCNC: 5 MG/L (ref 1.8–4.6)

## 2024-07-08 RX ORDER — NITROFURANTOIN 25; 75 MG/1; MG/1
100 CAPSULE ORAL 2 TIMES DAILY
Qty: 14 CAPSULE | Refills: 0 | Status: SHIPPED | OUTPATIENT
Start: 2024-07-08

## 2024-07-24 ENCOUNTER — HOSPITAL ENCOUNTER (OUTPATIENT)
Dept: RADIOLOGY | Facility: HOSPITAL | Age: 64
Discharge: HOME | End: 2024-07-24
Attending: PHYSICIAN ASSISTANT
Payer: MEDICARE

## 2024-08-02 DIAGNOSIS — Z79.4 TYPE 2 DIABETES MELLITUS WITH DIABETIC POLYNEUROPATHY, WITH LONG-TERM CURRENT USE OF INSULIN: ICD-10-CM

## 2024-08-02 DIAGNOSIS — E11.42 TYPE 2 DIABETES MELLITUS WITH DIABETIC POLYNEUROPATHY, WITH LONG-TERM CURRENT USE OF INSULIN: ICD-10-CM

## 2024-08-05 RX ORDER — METFORMIN HYDROCHLORIDE 1000 MG/1
TABLET ORAL
Qty: 180 TABLET | Refills: 0 | Status: SHIPPED | OUTPATIENT
Start: 2024-08-05 | End: 2024-08-08 | Stop reason: SDUPTHER

## 2024-08-05 RX ORDER — TIZANIDINE 4 MG/1
TABLET ORAL
Qty: 90 TABLET | Refills: 0 | Status: SHIPPED | OUTPATIENT
Start: 2024-08-05 | End: 2024-08-08 | Stop reason: SDUPTHER

## 2024-08-07 ENCOUNTER — LAB VISIT (OUTPATIENT)
Dept: LAB | Facility: HOSPITAL | Age: 64
End: 2024-08-07
Payer: MEDICARE

## 2024-08-07 DIAGNOSIS — D64.9 ANEMIA, UNSPECIFIED TYPE: ICD-10-CM

## 2024-08-07 DIAGNOSIS — D64.9 ANEMIA, UNSPECIFIED TYPE: Primary | ICD-10-CM

## 2024-08-07 LAB
OB PNL STL: NEGATIVE

## 2024-08-07 PROCEDURE — 82272 OCCULT BLD FECES 1-3 TESTS: CPT | Mod: PN

## 2024-08-08 ENCOUNTER — OFFICE VISIT (OUTPATIENT)
Dept: PRIMARY CARE CLINIC | Facility: CLINIC | Age: 64
End: 2024-08-08
Payer: MEDICARE

## 2024-08-08 ENCOUNTER — PATIENT OUTREACH (OUTPATIENT)
Dept: ADMINISTRATIVE | Facility: HOSPITAL | Age: 64
End: 2024-08-08
Payer: MEDICARE

## 2024-08-08 VITALS
RESPIRATION RATE: 18 BRPM | WEIGHT: 213.19 LBS | OXYGEN SATURATION: 99 % | SYSTOLIC BLOOD PRESSURE: 115 MMHG | HEART RATE: 92 BPM | BODY MASS INDEX: 40.28 KG/M2 | DIASTOLIC BLOOD PRESSURE: 72 MMHG

## 2024-08-08 DIAGNOSIS — Z79.4 TYPE 2 DIABETES MELLITUS WITH DIABETIC POLYNEUROPATHY, WITH LONG-TERM CURRENT USE OF INSULIN: ICD-10-CM

## 2024-08-08 DIAGNOSIS — E11.42 TYPE 2 DIABETES MELLITUS WITH DIABETIC POLYNEUROPATHY, WITH LONG-TERM CURRENT USE OF INSULIN: ICD-10-CM

## 2024-08-08 DIAGNOSIS — F32.A DEPRESSION, UNSPECIFIED DEPRESSION TYPE: ICD-10-CM

## 2024-08-08 DIAGNOSIS — E11.65 TYPE 2 DIABETES MELLITUS WITH HYPERGLYCEMIA, WITH LONG-TERM CURRENT USE OF INSULIN: ICD-10-CM

## 2024-08-08 DIAGNOSIS — Z12.31 SCREENING MAMMOGRAM FOR BREAST CANCER: Primary | ICD-10-CM

## 2024-08-08 DIAGNOSIS — F41.0 PANIC DISORDER: ICD-10-CM

## 2024-08-08 DIAGNOSIS — R11.0 NAUSEA: ICD-10-CM

## 2024-08-08 DIAGNOSIS — Z79.4 TYPE 2 DIABETES MELLITUS WITH HYPERGLYCEMIA, WITH LONG-TERM CURRENT USE OF INSULIN: ICD-10-CM

## 2024-08-08 RX ORDER — TEMAZEPAM 30 MG/1
30 CAPSULE ORAL NIGHTLY PRN
Qty: 90 CAPSULE | Refills: 1 | Status: SHIPPED | OUTPATIENT
Start: 2024-08-08

## 2024-08-08 RX ORDER — BUPROPION HYDROCHLORIDE 150 MG/1
150 TABLET ORAL DAILY
Qty: 90 TABLET | Refills: 3 | Status: SHIPPED | OUTPATIENT
Start: 2024-08-08 | End: 2025-08-08

## 2024-08-08 RX ORDER — PROMETHAZINE HYDROCHLORIDE 25 MG/1
25 TABLET ORAL
Qty: 30 TABLET | Refills: 1 | Status: SHIPPED | OUTPATIENT
Start: 2024-08-08

## 2024-08-08 RX ORDER — TIZANIDINE 4 MG/1
4 TABLET ORAL EVERY 8 HOURS
Qty: 270 TABLET | Refills: 3 | Status: SHIPPED | OUTPATIENT
Start: 2024-08-08

## 2024-08-08 RX ORDER — ALPRAZOLAM 1 MG/1
1 TABLET ORAL 2 TIMES DAILY
Qty: 60 TABLET | Refills: 3 | Status: SHIPPED | OUTPATIENT
Start: 2024-08-08 | End: 2024-12-06

## 2024-08-08 RX ORDER — IBUPROFEN 800 MG/1
800 TABLET ORAL 3 TIMES DAILY PRN
Qty: 90 TABLET | Refills: 1 | Status: SHIPPED | OUTPATIENT
Start: 2024-08-08

## 2024-08-08 RX ORDER — PREGABALIN 150 MG/1
150 CAPSULE ORAL 3 TIMES DAILY
Qty: 270 CAPSULE | Refills: 1 | Status: SHIPPED | OUTPATIENT
Start: 2024-08-08 | End: 2025-02-04

## 2024-08-08 RX ORDER — INSULIN GLARGINE 100 [IU]/ML
28 INJECTION, SOLUTION SUBCUTANEOUS NIGHTLY
Qty: 9 ML | Refills: 5 | Status: SHIPPED | OUTPATIENT
Start: 2024-08-08

## 2024-08-08 RX ORDER — METFORMIN HYDROCHLORIDE 1000 MG/1
1000 TABLET ORAL 2 TIMES DAILY WITH MEALS
Qty: 180 TABLET | Refills: 3 | Status: SHIPPED | OUTPATIENT
Start: 2024-08-08

## 2024-08-08 RX ORDER — NITROFURANTOIN 25; 75 MG/1; MG/1
100 CAPSULE ORAL DAILY
Qty: 90 CAPSULE | Refills: 3 | Status: SHIPPED | OUTPATIENT
Start: 2024-08-08

## 2024-08-12 ENCOUNTER — PATIENT OUTREACH (OUTPATIENT)
Dept: ADMINISTRATIVE | Facility: HOSPITAL | Age: 64
End: 2024-08-12
Payer: MEDICARE

## 2024-08-13 NOTE — PROGRESS NOTES
Population Health Chart Review & Patient Outreach Details      Additional [facility] Health Notes:               Updates Requested / Reviewed:      Updated Care Coordination Note and          Health Maintenance Topics Overdue:      VBHM Score: 3     Colon Cancer Screening  Osteoporosis Screening  Mammogram    Pneumonia Vaccine  Tetanus Vaccine  Shingles/Zoster Vaccine  RSV Vaccine                  Health Maintenance Topic(s) Outreach Outcomes & Actions Taken:    Colorectal Cancer Screening - Outreach Outcomes & Actions Taken  : External Records Uploaded, Care Team Updated, & History Updated if Applicable

## 2024-08-29 ENCOUNTER — TELEPHONE (OUTPATIENT)
Dept: HEMATOLOGY/ONCOLOGY | Facility: CLINIC | Age: 64
End: 2024-08-29
Payer: MEDICARE

## 2024-08-29 NOTE — TELEPHONE ENCOUNTER
Spoke to Mrs. Patel- she stated she would like to get her labs done at [place]. I faxed over the needed labs to [facility] and called [facility] to confirm they got the fax. They confirmed they got the fax. I also changed Mrs. Patel appointment to a virtual visit per request and AURORA Packer is aware. Patient is aware of appointment date and time. No other questions.

## 2024-08-30 ENCOUNTER — OFFICE VISIT (OUTPATIENT)
Dept: HEMATOLOGY/ONCOLOGY | Facility: CLINIC | Age: 64
End: 2024-08-30
Payer: MEDICARE

## 2024-08-30 ENCOUNTER — LAB VISIT (OUTPATIENT)
Dept: PRIMARY CARE CLINIC | Facility: CLINIC | Age: 64
End: 2024-08-30
Payer: MEDICARE

## 2024-08-30 ENCOUNTER — TELEPHONE (OUTPATIENT)
Dept: PRIMARY CARE CLINIC | Facility: CLINIC | Age: 64
End: 2024-08-30

## 2024-08-30 DIAGNOSIS — E11.65 TYPE 2 DIABETES MELLITUS WITH HYPERGLYCEMIA, WITH LONG-TERM CURRENT USE OF INSULIN: ICD-10-CM

## 2024-08-30 DIAGNOSIS — D50.0 IRON DEFICIENCY ANEMIA DUE TO CHRONIC BLOOD LOSS: Primary | ICD-10-CM

## 2024-08-30 DIAGNOSIS — E66.01 CLASS 2 SEVERE OBESITY WITH SERIOUS COMORBIDITY AND BODY MASS INDEX (BMI) OF 38.0 TO 38.9 IN ADULT, UNSPECIFIED OBESITY TYPE: ICD-10-CM

## 2024-08-30 DIAGNOSIS — Z79.4 TYPE 2 DIABETES MELLITUS WITHOUT COMPLICATION, WITH LONG-TERM CURRENT USE OF INSULIN: ICD-10-CM

## 2024-08-30 DIAGNOSIS — E11.42 TYPE 2 DIABETES MELLITUS WITH DIABETIC POLYNEUROPATHY, WITH LONG-TERM CURRENT USE OF INSULIN: ICD-10-CM

## 2024-08-30 DIAGNOSIS — E11.9 TYPE 2 DIABETES MELLITUS WITHOUT COMPLICATION, WITH LONG-TERM CURRENT USE OF INSULIN: ICD-10-CM

## 2024-08-30 DIAGNOSIS — Z79.4 TYPE 2 DIABETES MELLITUS WITH DIABETIC POLYNEUROPATHY, WITH LONG-TERM CURRENT USE OF INSULIN: ICD-10-CM

## 2024-08-30 DIAGNOSIS — Z79.4 TYPE 2 DIABETES MELLITUS WITH HYPERGLYCEMIA, WITH LONG-TERM CURRENT USE OF INSULIN: ICD-10-CM

## 2024-08-30 LAB
ALBUMIN SERPL BCP-MCNC: 3.3 G/DL (ref 3.5–5.2)
ALP SERPL-CCNC: 120 U/L (ref 55–135)
ALT SERPL W/O P-5'-P-CCNC: 12 U/L (ref 10–44)
ANION GAP SERPL CALC-SCNC: 8 MMOL/L (ref 8–16)
AST SERPL-CCNC: 16 U/L (ref 10–40)
BILIRUB SERPL-MCNC: 0.3 MG/DL (ref 0.1–1)
BUN SERPL-MCNC: 16 MG/DL (ref 8–23)
CALCIUM SERPL-MCNC: 8.7 MG/DL (ref 8.7–10.5)
CHLORIDE SERPL-SCNC: 110 MMOL/L (ref 95–110)
CHOLEST SERPL-MCNC: 106 MG/DL (ref 120–199)
CHOLEST/HDLC SERPL: 3.3 {RATIO} (ref 2–5)
CO2 SERPL-SCNC: 24 MMOL/L (ref 23–29)
CREAT SERPL-MCNC: 1.1 MG/DL (ref 0.5–1.4)
EST. GFR  (NO RACE VARIABLE): 55.8 ML/MIN/1.73 M^2
ESTIMATED AVG GLUCOSE: 151 MG/DL (ref 68–131)
GLUCOSE SERPL-MCNC: 147 MG/DL (ref 70–110)
HBA1C MFR BLD: 6.9 % (ref 4–5.6)
HDLC SERPL-MCNC: 32 MG/DL (ref 40–75)
HDLC SERPL: 30.2 % (ref 20–50)
LDLC SERPL CALC-MCNC: 53.4 MG/DL (ref 63–159)
NONHDLC SERPL-MCNC: 74 MG/DL
POTASSIUM SERPL-SCNC: 3.9 MMOL/L (ref 3.5–5.1)
PROT SERPL-MCNC: 6.5 G/DL (ref 6–8.4)
SODIUM SERPL-SCNC: 142 MMOL/L (ref 136–145)
TRIGL SERPL-MCNC: 103 MG/DL (ref 30–150)
TSH SERPL DL<=0.005 MIU/L-ACNC: 0.82 UIU/ML (ref 0.4–4)

## 2024-08-30 PROCEDURE — 84443 ASSAY THYROID STIM HORMONE: CPT | Performed by: PHYSICIAN ASSISTANT

## 2024-08-30 PROCEDURE — 80061 LIPID PANEL: CPT | Performed by: PHYSICIAN ASSISTANT

## 2024-08-30 PROCEDURE — 83036 HEMOGLOBIN GLYCOSYLATED A1C: CPT | Performed by: PHYSICIAN ASSISTANT

## 2024-08-30 PROCEDURE — 80053 COMPREHEN METABOLIC PANEL: CPT | Performed by: PHYSICIAN ASSISTANT

## 2024-08-30 NOTE — PROGRESS NOTES
PATIENT: Amanda Barton  MRN: [id number]  DATE: 8/30/2024    The patient location is: Home  The chief complaint leading to consultation is: Anemia follow up     Visit type: audiovisual    Face to Face time with patient: 5 minutes  20  minutes of total time spent on the encounter, which includes face to face time and non-face to face time preparing to see the patient (eg, review of tests), Obtaining and/or reviewing separately obtained history, Documenting clinical information in the electronic or other health record, Independently interpreting results (not separately reported) and communicating results to the patient/family/caregiver, or Care coordination (not separately reported).         Each patient to whom he or she provides medical services by telemedicine is:  (1) informed of the relationship between the physician and patient and the respective role of any other health care provider with respect to management of the patient; and (2) notified that he or she may decline to receive medical services by telemedicine and may withdraw from such care at any time.    Notes:        Diagnosis:   1. Iron deficiency anemia due to chronic blood loss    2. Type 2 diabetes mellitus without complication, with long-term current use of insulin    3. Class 2 severe obesity with serious comorbidity and body mass index (BMI) of 38.0 to 38.9 in adult, unspecified obesity type          Chief Complaint: Iron Deficiency anemia          Subjective:    Interval History: Ms. Barton is a 64 y.o. female who returns for follow up for GERALDO. She was previously seen by Dr Diamond in February 2023 and treated with 1000mg IV iron completed on 5/25/2023. Colonoscopy in 2017 no evidence of bleed. An upper GI in 2019 revealed gastritis, and was negative for H.Pylori. Cytoscopy in Apr. 2024 was WNL. Pt reports recently being prescribed magnesium 400 mg bid, and Calcium 100mg daily by cardiology. She has not started calcium. Pt received 2 iv iron  infusions at [place]. Today she follows up for review of labs. Endorses chronic fatigue but feeling well overall. Denies fever, chills, cp, palpitations,  numbness, tingling, n/v, diarrhea, constipation, abdominal pain, new bumps, lumps, bleeding, bruising.     Her most recent Labs at [facility] [place] on 8/29/24 are as follows:  Wbc 6.2, Hgb 11.5, Hct 35.1, MCH 28.6, MCV 87, Plt 192, Creatnine 1.32, GFR 42, Tbilli 0.4  Ferritin 105    Prior History:   62-year-old female with history of paroxysmal atrial fibrillation, pericarditis with prolonged hospitalization in 2018, NSTEMI who presents to us as a new patient in hematology clinic on 2/27/23.     Regarding her anemia, she is had longstanding profound anemia dating back to 2018.  She is never had a hemoglobin greater than 10.   She is had consistent microcytosis with a MCV between 78 and 80.  RDW is markedly elevated up to 18-19.     Regarding blood losses she denies hematochezia, melena, hemoptysis, epistaxis, reports she had a hysterectomy many years ago so no vaginal bleeding.  She had a colonoscopy/EGD approximately 3 weeks ago (Feb 2023) with no etiology of anemia.  Gastroenterologist recommended PillCam which they plan to complete after she has workup for chronic cough.     She reports she has numerous medical problems and is seeing a number of subspecialists including a endocrinologist, cardiologist, and pulmonologist.  She says she has profound fatigue and feels tired all the time.  She has a chronic cough that has been going on for weeks which is distressing to her, her daughter reports she is had an extensive workup for the cough without a diagnosis, she is scheduled to see a pulmonologist for further workup soon.  Oncology History    No history exists.       Past Medical History:   Past Medical History:   Diagnosis Date    Acute bacterial pericarditis 12/02/2017    Anemia     Bacteremia due to methicillin resistant Staphylococcus aureus 11/20/2017    Diabetes  mellitus     Encounter for blood transfusion     GERD (gastroesophageal reflux disease)     Heart murmur     History of pneumonia     History of UTI     Hypertension     Migraine headache     Personal history of colonic polyps 2023    Sleep apnea     no machine yet    Type 2 diabetes mellitus without complication, with long-term current use of insulin 2017       Past Surgical HIstory:   Past Surgical History:   Procedure Laterality Date    APPENDECTOMY      CATHETERIZATION OF BOTH LEFT AND RIGHT HEART N/A 2023    Procedure: Right and Left Heart Cath;  Surgeon: Osman Williamson MD;  Location: [facility] CATH;  Service: Cardiology;  Laterality: N/A;     SECTION      CHOLECYSTECTOMY      COLONOSCOPY N/A 2017    Procedure: COLONOSCOPY;  Surgeon: Juan Lepe MD;  Location: [facility] ENDO;  Service: Endoscopy;  Laterality: N/A;    COLONOSCOPY  2023    3 yr recall    ESOPHAGOGASTRODUODENOSCOPY N/A 2019    Procedure: EGD (ESOPHAGOGASTRODUODENOSCOPY);  Surgeon: Gustavo Austin MD;  Location: [facility] ENDO;  Service: Endoscopy;  Laterality: N/A;  with Push Enteroscopy    ESOPHAGOGASTRODUODENOSCOPY N/A 2019    Procedure: EGD (ESOPHAGOGASTRODUODENOSCOPY);  Surgeon: Gustavo Austin MD;  Location: [facility] ENDO;  Service: Endoscopy;  Laterality: N/A;  Push enteroscopy    ESOPHAGOGASTRODUODENOSCOPY  2023    HYSTERECTOMY      IRRIGATION AND DEBRIDEMENT OF LUMBAR SPINE  2022    LUMBAR FUSION  2021    L4-L5       Family History:   Family History   Problem Relation Name Age of Onset    No Known Problems Mother      Heart disease Father      Heart failure Father      Arrhythmia Father         Social History:  reports that she has never smoked. She has never been exposed to tobacco smoke. She has never used smokeless tobacco. She reports that she does not drink alcohol.    Allergies:  Review of patient's allergies indicates:   Allergen Reactions    Ciprofloxacin Nausea And  Vomiting       Medications:  Current Outpatient Medications   Medication Sig Dispense Refill    albuterol (VENTOLIN HFA) 90 mcg/actuation inhaler Inhale 2 puffs into the lungs every 6 (six) hours as needed for Wheezing (Rescue Inhaler). Rescue 6.7 g 11    ALPRAZolam (XANAX) 1 MG tablet Take 1 tablet (1 mg total) by mouth 2 (two) times daily. 60 tablet 3    blood-glucose meter kit Use as instructed 1 each 0    blood-glucose meter,continuous (DEXCOM G6 ) Misc Use as directed 1 each 0    blood-glucose sensor (DEXCOM G6 SENSOR) Cristiana Use as directed 3 each 12    blood-glucose transmitter (DEXCOM G6 TRANSMITTER) Cristiana Use as directed 1 each 3    buPROPion (WELLBUTRIN XL) 150 MG TB24 tablet Take 1 tablet (150 mg total) by mouth once daily. 90 tablet 3    carvediloL (COREG) 3.125 MG tablet Take 1 tablet (3.125 mg total) by mouth 2 (two) times daily with meals. (Patient taking differently: Take 6.25 mg by mouth 2 (two) times daily with meals.) 180 tablet 3    desloratadine (CLARINEX) 5 mg tablet Take 1 tablet (5 mg total) by mouth once daily. 30 tablet 11    DULoxetine (CYMBALTA) 60 MG capsule Take 1 capsule (60 mg total) by mouth 2 (two) times daily. 180 capsule 3    ibuprofen (ADVIL,MOTRIN) 800 MG tablet Take 1 tablet (800 mg total) by mouth 3 (three) times daily as needed for Pain. As needed 90 tablet 1    insulin glargine U-100, Lantus, (LANTUS SOLOSTAR U-100 INSULIN) 100 unit/mL (3 mL) InPn pen Inject 28 Units into the skin every evening. 9 mL 5    lancets (LANCETS,ULTRA THIN) Misc 1 Lancet by Misc.(Non-Drug; Combo Route) route 2 (two) times a day. 200 each 11    lancing device Misc 1 Device by Misc.(Non-Drug; Combo Route) route 2 (two) times a day. 1 each 0    losartan (COZAAR) 50 MG tablet Take 1 tablet (50 mg total) by mouth once daily. 90 tablet 3    magnesium oxide (MAG-OX) 400 mg (241.3 mg magnesium) tablet Take 400 mg by mouth 2 (two) times daily.      metFORMIN (GLUCOPHAGE) 1000 MG tablet Take 1 tablet  "(1,000 mg total) by mouth 2 (two) times daily with meals. 180 tablet 3    montelukast (SINGULAIR) 10 mg tablet Take 1 tablet (10 mg total) by mouth every evening. 90 tablet 3    nitrofurantoin, macrocrystal-monohydrate, (MACROBID) 100 MG capsule Take 1 capsule (100 mg total) by mouth once daily. 90 capsule 3    NOVOLOG FLEXPEN U-100 INSULIN 100 unit/mL (3 mL) InPn pen 6 u Ac + correction  Max TDD 54u 30 mL 11    oxybutynin (DITROPAN-XL) 5 MG TR24 Take 1 tablet (5 mg total) by mouth once daily. 90 tablet 3    oxyCODONE-acetaminophen (PERCOCET) 5-325 mg per tablet Take 1 tablet by mouth every 4 to 6 hours as needed for Pain. (Patient taking differently: Take 1 tablet by mouth every 4 to 6 hours as needed for Pain. 10mg) 27 tablet 0    pantoprazole (PROTONIX) 40 MG tablet Take 1 tablet (40 mg total) by mouth once daily. 90 tablet 3    pen needle, diabetic (BD KARYN 2ND GEN PEN NEEDLE) 32 gauge x 5/32" Ndle Checks bg 4 times a day 150 each 12    pregabalin (LYRICA) 150 MG capsule Take 1 capsule (150 mg total) by mouth 3 (three) times daily. 270 capsule 1    promethazine (PHENERGAN) 25 MG tablet Take 1 tablet (25 mg total) by mouth before meals as needed for Nausea. 30 tablet 1    rOPINIRole (REQUIP) 2 MG tablet Take 1 tablet (2 mg total) by mouth 3 (three) times daily. 270 tablet 3    rosuvastatin (CRESTOR) 20 MG tablet Take 1 tablet (20 mg total) by mouth every evening. 90 tablet 3    spironolactone (ALDACTONE) 50 MG tablet Take 1 tablet (50 mg total) by mouth once daily. 360 tablet 0    temazepam (RESTORIL) 30 mg capsule Take 1 capsule (30 mg total) by mouth nightly as needed for Insomnia. 90 capsule 1    tirzepatide 7.5 mg/0.5 mL PnIj Inject 7.5 mg into the skin every 7 days. 4 Pen 11    tiZANidine (ZANAFLEX) 4 MG tablet Take 1 tablet (4 mg total) by mouth every 8 (eight) hours. 270 tablet 3    TRUE METRIX GLUCOSE TEST STRIP Strp USE TO TEST BLOOD SUGAR TWICE DAILY 50 strip 1     No current facility-administered " medications for this visit.       Review of Systems   Constitutional:  Positive for unexpected weight change. Negative for appetite change, chills and fever.   HENT:  Negative for mouth sores.    Eyes:  Negative for visual disturbance.   Respiratory:  Positive for shortness of breath. Negative for cough.    Cardiovascular:  Negative for chest pain, palpitations and leg swelling.   Gastrointestinal:  Negative for abdominal pain, blood in stool and diarrhea.   Genitourinary:  Negative for frequency and hematuria.   Musculoskeletal:  Positive for back pain and myalgias. Joint swelling: lower extremities.  Skin:  Negative for rash.   Neurological:  Positive for headaches. Negative for light-headedness.   Hematological:  Negative for adenopathy. Does not bruise/bleed easily.   Psychiatric/Behavioral: Negative.  The patient is not nervous/anxious.        ECOG Performance Status:   ECOG SCORE             Objective:      Vitals: There were no vitals filed for this visit.  BMI: There is no height or weight on file to calculate BMI.    Physical Exam    Laboratory Data:  No results found for this or any previous visit (from the past 24 hour(s)).       Imaging: Cystoscopy     Date/Time: 4/5/2024 8:00 AM     Blood Loss:  None     The patients clinic history and physical were reviewed and there are no changes.     The patient was placed in the frog-leg position.  The genitals were prepped and draped in a sterile fashion.   Using a flexible scope, a careful cystoscopic exam was then performed.  The entire bladder mucosa was systematically visualized.  The mucosa appeared normal.  There were no lesions, masses foreign bodies or stones.   Each ureteral orifices were visualized and both had clear efflux of urine.  On retroflexion the bladder neck appeared normal.  The cystoscope was then removed and I examined the entire length of the urethra.  The urethra appeared normal.  She tolerated the procedure well.  There were no  complications.  Atrophic vaginitis noted on exam with vulvar erythema.     Impression:  Normal cystoscopy.  Recommend gyn evaluation      Assessment:       1. Iron deficiency anemia due to chronic blood loss    2. Type 2 diabetes mellitus without complication, with long-term current use of insulin    3. Class 2 severe obesity with serious comorbidity and body mass index (BMI) of 38.0 to 38.9 in adult, unspecified obesity type             Plan:    iron-deficiency anemia  -she is had longstanding profound anemia dating back to 2018.  She is never had a hemoglobin greater than 10.  She ihad consistent microcytosis with MCV between 78 and 80.  RDW is markedly elevated up to 18-19.  -her last iron studies were 3 years ago with a ferritin of 10, confirming iron-deficiency anemia  -last colonoscopy and EGD patient reports was 3 weeks ago (outside [facility]) was relatively normal with no source of blood loss identified,gastroenterologist recommended PillCam which they plan on completing once they workup her chronic cough  -patient reports she has previously been on oral iron and is unable to tolerate this due to GI side effects including vomiting  -she needs rapid replacement of her iron, I think this is best suited with IV iron supplementation, calculation of her iron deficit shows she has approximately a 1400mg deficit.  Will obtain complete iron studies today and replace 1000 mg to start- venofer 200mg x 5 infusions, patient is agreeable to this plan, return to clinic in 2 months w/repeat labs.   7/3/2024: Will repeat cbc, cmp, ferritin, iron studies, stfr today.   8/30/24:  Recent CBC shows mild normocytic anemia. Stable. Ferritin 105. No need for iron replacement at this time. Anemia likely multifactorial, r/t chronic disease.    F/u in 3 months with repeat labs at least one day prior.      # type 2 diabetes  -on Lantus, managed by primary care     # obesity with BMI of 38  -with complication, type 2 diabetes and  hypertension    Visit today included increased complexity associated with the care of the episodic problem  addressed and managing the longitudinal care of the patient due to the serious and/or complex managed problem(s) anemia.       Med Onc Chart Routing      Follow up with physician 3 months. with repeat cbc, cmp, ferritin at least one day prior at [facility].   Follow up with IZZY    Infusion scheduling note    Injection scheduling note    Labs    Imaging    Pharmacy appointment    Other referrals                  Plan was discussed with the patient at length, and she verbalized understanding. Amanda was given an opportunity to ask questions that were answered to her satisfaction, and she was advised to call in the interval if any problems or questions arise.    Assessment/Plan reviewed and approved by Dr Diamond    20 minutes were spent in coordination of patient's care, record review and counseling.    BRYAN Lozoya, FNP-C  Hematology & Oncology

## 2024-09-13 ENCOUNTER — TELEPHONE (OUTPATIENT)
Dept: NEUROLOGY | Facility: CLINIC | Age: 64
End: 2024-09-13
Payer: MEDICARE

## 2024-09-24 ENCOUNTER — PATIENT MESSAGE (OUTPATIENT)
Dept: PRIMARY CARE CLINIC | Facility: CLINIC | Age: 64
End: 2024-09-24
Payer: MEDICARE

## 2024-09-26 DIAGNOSIS — Z00.00 ENCOUNTER FOR MEDICARE ANNUAL WELLNESS EXAM: ICD-10-CM

## 2024-10-08 ENCOUNTER — OFFICE VISIT (OUTPATIENT)
Dept: PRIMARY CARE CLINIC | Facility: CLINIC | Age: 64
End: 2024-10-08
Payer: MEDICARE

## 2024-10-08 VITALS
WEIGHT: 205.5 LBS | BODY MASS INDEX: 38.8 KG/M2 | OXYGEN SATURATION: 94 % | HEIGHT: 61 IN | HEART RATE: 92 BPM | SYSTOLIC BLOOD PRESSURE: 112 MMHG | DIASTOLIC BLOOD PRESSURE: 74 MMHG

## 2024-10-08 DIAGNOSIS — Z79.4 TYPE 2 DIABETES MELLITUS WITH DIABETIC POLYNEUROPATHY, WITH LONG-TERM CURRENT USE OF INSULIN: ICD-10-CM

## 2024-10-08 DIAGNOSIS — Z79.4 TYPE 2 DIABETES MELLITUS WITH HYPERGLYCEMIA, WITH LONG-TERM CURRENT USE OF INSULIN: ICD-10-CM

## 2024-10-08 DIAGNOSIS — E11.42 TYPE 2 DIABETES MELLITUS WITH DIABETIC POLYNEUROPATHY, WITH LONG-TERM CURRENT USE OF INSULIN: ICD-10-CM

## 2024-10-08 DIAGNOSIS — D64.9 ANEMIA, UNSPECIFIED TYPE: Primary | ICD-10-CM

## 2024-10-08 DIAGNOSIS — E11.65 TYPE 2 DIABETES MELLITUS WITH HYPERGLYCEMIA, WITH LONG-TERM CURRENT USE OF INSULIN: ICD-10-CM

## 2024-10-08 DIAGNOSIS — R92.8 ABNORMAL MAMMOGRAM OF BOTH BREASTS: ICD-10-CM

## 2024-10-08 DIAGNOSIS — F32.A DEPRESSION, UNSPECIFIED DEPRESSION TYPE: ICD-10-CM

## 2024-10-08 DIAGNOSIS — Z23 NEED FOR VACCINATION: ICD-10-CM

## 2024-10-08 DIAGNOSIS — E87.6 HYPOKALEMIA: ICD-10-CM

## 2024-10-08 LAB
ALBUMIN SERPL BCP-MCNC: 3.8 G/DL (ref 3.5–5.2)
ALP SERPL-CCNC: 116 U/L (ref 55–135)
ALT SERPL W/O P-5'-P-CCNC: 17 U/L (ref 10–44)
ANION GAP SERPL CALC-SCNC: 15 MMOL/L (ref 8–16)
AST SERPL-CCNC: 25 U/L (ref 10–40)
BASOPHILS # BLD AUTO: 0.05 K/UL (ref 0–0.2)
BASOPHILS NFR BLD: 0.7 % (ref 0–1.9)
BILIRUB SERPL-MCNC: 0.5 MG/DL (ref 0.1–1)
BUN SERPL-MCNC: 14 MG/DL (ref 8–23)
CALCIUM SERPL-MCNC: 9.6 MG/DL (ref 8.7–10.5)
CHLORIDE SERPL-SCNC: 97 MMOL/L (ref 95–110)
CO2 SERPL-SCNC: 28 MMOL/L (ref 23–29)
CREAT SERPL-MCNC: 1.2 MG/DL (ref 0.5–1.4)
DIFFERENTIAL METHOD BLD: ABNORMAL
EOSINOPHIL # BLD AUTO: 0.1 K/UL (ref 0–0.5)
EOSINOPHIL NFR BLD: 1.5 % (ref 0–8)
ERYTHROCYTE [DISTWIDTH] IN BLOOD BY AUTOMATED COUNT: 14.7 % (ref 11.5–14.5)
EST. GFR  (NO RACE VARIABLE): 50.2 ML/MIN/1.73 M^2
GLUCOSE SERPL-MCNC: 180 MG/DL (ref 70–110)
HCT VFR BLD AUTO: 36.5 % (ref 37–48.5)
HGB BLD-MCNC: 11.6 G/DL (ref 12–16)
IMM GRANULOCYTES # BLD AUTO: 0.02 K/UL (ref 0–0.04)
IMM GRANULOCYTES NFR BLD AUTO: 0.3 % (ref 0–0.5)
IRON SERPL-MCNC: 57 UG/DL (ref 30–160)
LYMPHOCYTES # BLD AUTO: 1.2 K/UL (ref 1–4.8)
LYMPHOCYTES NFR BLD: 16.5 % (ref 18–48)
MCH RBC QN AUTO: 28.5 PG (ref 27–31)
MCHC RBC AUTO-ENTMCNC: 31.8 G/DL (ref 32–36)
MCV RBC AUTO: 90 FL (ref 82–98)
MONOCYTES # BLD AUTO: 0.5 K/UL (ref 0.3–1)
MONOCYTES NFR BLD: 6.3 % (ref 4–15)
NEUTROPHILS # BLD AUTO: 5.6 K/UL (ref 1.8–7.7)
NEUTROPHILS NFR BLD: 74.7 % (ref 38–73)
NRBC BLD-RTO: 0 /100 WBC
PLATELET # BLD AUTO: 250 K/UL (ref 150–450)
PMV BLD AUTO: 11.4 FL (ref 9.2–12.9)
POTASSIUM SERPL-SCNC: 3.4 MMOL/L (ref 3.5–5.1)
PROT SERPL-MCNC: 7.4 G/DL (ref 6–8.4)
RBC # BLD AUTO: 4.07 M/UL (ref 4–5.4)
SATURATED IRON: 13 % (ref 20–50)
SODIUM SERPL-SCNC: 140 MMOL/L (ref 136–145)
TOTAL IRON BINDING CAPACITY: 440 UG/DL (ref 250–450)
TRANSFERRIN SERPL-MCNC: 297 MG/DL (ref 200–375)
WBC # BLD AUTO: 7.45 K/UL (ref 3.9–12.7)

## 2024-10-08 PROCEDURE — 3078F DIAST BP <80 MM HG: CPT | Mod: CPTII,S$GLB,, | Performed by: PHYSICIAN ASSISTANT

## 2024-10-08 PROCEDURE — 3074F SYST BP LT 130 MM HG: CPT | Mod: CPTII,S$GLB,, | Performed by: PHYSICIAN ASSISTANT

## 2024-10-08 PROCEDURE — G0008 ADMIN INFLUENZA VIRUS VAC: HCPCS | Mod: S$GLB,,, | Performed by: PHYSICIAN ASSISTANT

## 2024-10-08 PROCEDURE — 4010F ACE/ARB THERAPY RXD/TAKEN: CPT | Mod: CPTII,S$GLB,, | Performed by: PHYSICIAN ASSISTANT

## 2024-10-08 PROCEDURE — 3008F BODY MASS INDEX DOCD: CPT | Mod: CPTII,S$GLB,, | Performed by: PHYSICIAN ASSISTANT

## 2024-10-08 PROCEDURE — 90677 PCV20 VACCINE IM: CPT | Mod: S$GLB,,, | Performed by: PHYSICIAN ASSISTANT

## 2024-10-08 PROCEDURE — 99214 OFFICE O/P EST MOD 30 MIN: CPT | Mod: 25,S$GLB,, | Performed by: PHYSICIAN ASSISTANT

## 2024-10-08 PROCEDURE — 1160F RVW MEDS BY RX/DR IN RCRD: CPT | Mod: CPTII,S$GLB,, | Performed by: PHYSICIAN ASSISTANT

## 2024-10-08 PROCEDURE — 80053 COMPREHEN METABOLIC PANEL: CPT | Mod: HCNC | Performed by: PHYSICIAN ASSISTANT

## 2024-10-08 PROCEDURE — 83540 ASSAY OF IRON: CPT | Mod: HCNC | Performed by: PHYSICIAN ASSISTANT

## 2024-10-08 PROCEDURE — 3044F HG A1C LEVEL LT 7.0%: CPT | Mod: CPTII,S$GLB,, | Performed by: PHYSICIAN ASSISTANT

## 2024-10-08 PROCEDURE — 3288F FALL RISK ASSESSMENT DOCD: CPT | Mod: CPTII,S$GLB,, | Performed by: PHYSICIAN ASSISTANT

## 2024-10-08 PROCEDURE — 1159F MED LIST DOCD IN RCRD: CPT | Mod: CPTII,S$GLB,, | Performed by: PHYSICIAN ASSISTANT

## 2024-10-08 PROCEDURE — 82728 ASSAY OF FERRITIN: CPT | Mod: HCNC | Performed by: PHYSICIAN ASSISTANT

## 2024-10-08 PROCEDURE — 85025 COMPLETE CBC W/AUTO DIFF WBC: CPT | Mod: HCNC | Performed by: PHYSICIAN ASSISTANT

## 2024-10-08 PROCEDURE — 90653 IIV ADJUVANT VACCINE IM: CPT | Mod: S$GLB,,, | Performed by: PHYSICIAN ASSISTANT

## 2024-10-08 PROCEDURE — G0009 ADMIN PNEUMOCOCCAL VACCINE: HCPCS | Mod: S$GLB,,, | Performed by: PHYSICIAN ASSISTANT

## 2024-10-08 PROCEDURE — 1101F PT FALLS ASSESS-DOCD LE1/YR: CPT | Mod: CPTII,S$GLB,, | Performed by: PHYSICIAN ASSISTANT

## 2024-10-08 PROCEDURE — 82570 ASSAY OF URINE CREATININE: CPT | Mod: HCNC | Performed by: PHYSICIAN ASSISTANT

## 2024-10-08 RX ORDER — FUROSEMIDE 20 MG/1
20 TABLET ORAL 2 TIMES DAILY
COMMUNITY

## 2024-10-08 RX ORDER — POTASSIUM CHLORIDE 20 MEQ/1
20 TABLET, EXTENDED RELEASE ORAL DAILY
Qty: 90 TABLET | Refills: 3 | Status: SHIPPED | OUTPATIENT
Start: 2024-10-08

## 2024-10-08 NOTE — PROGRESS NOTES
Subjective     Patient ID: Amanda Barton is a 64 y.o. female.    Chief Complaint: Hospital Follow Up    Patient is a 65 yo female who comes in today for a hospital follow up. She was apparently admitted to [facility] for 5 days. She had resistant hypokalemia. She was referred to see hematology and they are monitoring her.     Patient Active Problem List:     History of non-ST elevation myocardial infarction (NSTEMI)     BJ (obstructive sleep apnea)     Chronic diastolic heart failure: diuretic medication was adjusted do to hypokalemia.      Diastolic dysfunction     Paroxysmal atrial fibrillation     Pericarditis     Cough     Sepsis     Febrile illness     Iron deficiency anemia: followed by hematology for iron infusions     Troponin level elevated     Class 2 severe obesity with serious comorbidity and body mass index (BMI) of 38.0 to 38.9 in adult, unspecified obesity type     Symptomatic anemia     Constipation     GI bleed     Type 2 diabetes mellitus with hyperglycemia, with long-term current use of insulin     Type 2 diabetes mellitus with diabetic polyneuropathy, with long-term current use of insulin: Request to see another diabetic NP     Type 2 diabetes mellitus with microalbuminuria, with long-term current use of insulin     Chronic obstructive pulmonary disease, unspecified COPD type     Insomnia     Osteoarthritis of lumbar spine     Depression     DM type 2 with diabetic mixed hyperlipidemia     Gastroesophageal reflux disease without esophagitis     Urinary incontinence     Seasonal allergies     Panic disorder     Vitamin D deficiency     Nausea     Restless leg syndrome     Hypertension     Peripheral vascular disease, unspecified     Memory loss    Past Medical History:  12/02/2017: Acute bacterial pericarditis  No date: Anemia  11/20/2017: Bacteremia due to methicillin resistant Staphylococcus   aureus  No date: Diabetes mellitus  No date: Encounter for blood transfusion  No date:  GERD (gastroesophageal reflux disease)  No date: Heart murmur  No date: History of pneumonia  No date: History of UTI  No date: Hypertension  No date: Migraine headache  01/25/2023: Personal history of colonic polyps  No date: Sleep apnea      Comment:  no machine yet  11/19/2017: Type 2 diabetes mellitus without complication, with long-  term current use of insulin      Depression  Visit Type: follow-up  Patient presents with the following symptoms: anhedonia, depressed mood, feelings of hopelessness, feelings of worthlessness and hypersomnia.  Patient is not experiencing: shortness of breath, suicidal ideas, suicidal planning and thoughts of death.    Sleep quality: fair  Compliance with medications:  %      Review of Systems   Constitutional:  Positive for fatigue. Negative for chills.   Respiratory:  Negative for chest tightness and shortness of breath.    Cardiovascular:  Negative for chest pain.   Gastrointestinal:  Negative for abdominal pain.   Musculoskeletal:  Positive for back pain.   Psychiatric/Behavioral:  Positive for depression and depressed mood. Negative for suicidal ideas.           Objective     Physical Exam  Vitals reviewed.   Constitutional:       General: She is not in acute distress.     Appearance: Normal appearance. She is not ill-appearing, toxic-appearing or diaphoretic.   Cardiovascular:      Rate and Rhythm: Normal rate and regular rhythm.      Pulses: Normal pulses.      Heart sounds: Normal heart sounds. No murmur heard.     No friction rub. No gallop.   Pulmonary:      Effort: Pulmonary effort is normal. No respiratory distress.      Breath sounds: Normal breath sounds. No stridor. No wheezing, rhonchi or rales.   Chest:      Chest wall: No tenderness.   Abdominal:      Palpations: Abdomen is soft.      Tenderness: There is no abdominal tenderness.   Neurological:      Mental Status: She is alert.            Assessment and Plan     1. Anemia, unspecified type  -     CBC Auto  Differential; Future; Expected date: 10/08/2024  -     Ferritin; Future; Expected date: 10/08/2024  -     Iron and TIBC; Future; Expected date: 10/08/2024    2. Hypokalemia  -     Comprehensive Metabolic Panel; Future; Expected date: 10/08/2024    3. Type 2 diabetes mellitus with hyperglycemia, with long-term current use of insulin  -     Ambulatory referral/consult to Endocrinology; Future; Expected date: 10/15/2024  -     tirzepatide 7.5 mg/0.5 mL PnIj; Inject 7.5 mg into the skin every 7 days.  Dispense: 4 Pen; Refill: 11  -     Microalbumin/creatinine urine ratio    4. Type 2 diabetes mellitus with diabetic polyneuropathy, with long-term current use of insulin  -     tirzepatide 7.5 mg/0.5 mL PnIj; Inject 7.5 mg into the skin every 7 days.  Dispense: 4 Pen; Refill: 11    5. Need for vaccination  -     (VFC) PCV20 (Prevnar 20) IM vaccine (>/= 6 wks)    6. Depression, unspecified depression type  -     Ambulatory referral/consult to Psychiatry; Future; Expected date: 10/15/2024    7. Abnormal mammogram of both breasts  -     US Breast Bilateral Limited    Other orders  -     potassium chloride SA (K-DUR,KLOR-CON) 20 MEQ tablet; Take 1 tablet (20 mEq total) by mouth once daily.  Dispense: 90 tablet; Refill: 3        I spent 30 minutes on this encounter, time includes face-to-face, chart review, documentation, test review and orders.           Fu 3 months

## 2024-10-09 ENCOUNTER — PATIENT MESSAGE (OUTPATIENT)
Dept: PSYCHIATRY | Facility: CLINIC | Age: 64
End: 2024-10-09
Payer: MEDICARE

## 2024-10-09 LAB
ALBUMIN/CREAT UR: 50 UG/MG (ref 0–30)
CREAT UR-MCNC: 32 MG/DL (ref 15–325)
FERRITIN SERPL-MCNC: 489 NG/ML (ref 20–300)
MICROALBUMIN UR DL<=1MG/L-MCNC: 16 UG/ML

## 2024-10-29 ENCOUNTER — LAB VISIT (OUTPATIENT)
Dept: LAB | Facility: HOSPITAL | Age: 64
End: 2024-10-29
Attending: NURSE PRACTITIONER
Payer: MEDICARE

## 2024-10-29 ENCOUNTER — OFFICE VISIT (OUTPATIENT)
Dept: ENDOCRINOLOGY | Facility: CLINIC | Age: 64
End: 2024-10-29
Payer: MEDICARE

## 2024-10-29 VITALS
WEIGHT: 213.63 LBS | OXYGEN SATURATION: 99 % | BODY MASS INDEX: 40.33 KG/M2 | HEIGHT: 61 IN | HEART RATE: 73 BPM | SYSTOLIC BLOOD PRESSURE: 118 MMHG | DIASTOLIC BLOOD PRESSURE: 58 MMHG

## 2024-10-29 DIAGNOSIS — E78.2 DM TYPE 2 WITH DIABETIC MIXED HYPERLIPIDEMIA: ICD-10-CM

## 2024-10-29 DIAGNOSIS — Z79.4 TYPE 2 DIABETES MELLITUS WITH DIABETIC POLYNEUROPATHY, WITH LONG-TERM CURRENT USE OF INSULIN: Primary | ICD-10-CM

## 2024-10-29 DIAGNOSIS — E11.42 TYPE 2 DIABETES MELLITUS WITH DIABETIC POLYNEUROPATHY, WITH LONG-TERM CURRENT USE OF INSULIN: Primary | ICD-10-CM

## 2024-10-29 DIAGNOSIS — E66.812 CLASS 2 SEVERE OBESITY WITH SERIOUS COMORBIDITY AND BODY MASS INDEX (BMI) OF 38.0 TO 38.9 IN ADULT, UNSPECIFIED OBESITY TYPE: ICD-10-CM

## 2024-10-29 DIAGNOSIS — E11.29 TYPE 2 DIABETES MELLITUS WITH MICROALBUMINURIA, WITH LONG-TERM CURRENT USE OF INSULIN: ICD-10-CM

## 2024-10-29 DIAGNOSIS — E11.65 TYPE 2 DIABETES MELLITUS WITH HYPERGLYCEMIA, WITH LONG-TERM CURRENT USE OF INSULIN: ICD-10-CM

## 2024-10-29 DIAGNOSIS — I10 HYPERTENSION, UNSPECIFIED TYPE: ICD-10-CM

## 2024-10-29 DIAGNOSIS — Z79.4 TYPE 2 DIABETES MELLITUS WITH HYPERGLYCEMIA, WITH LONG-TERM CURRENT USE OF INSULIN: ICD-10-CM

## 2024-10-29 DIAGNOSIS — R80.9 TYPE 2 DIABETES MELLITUS WITH MICROALBUMINURIA, WITH LONG-TERM CURRENT USE OF INSULIN: ICD-10-CM

## 2024-10-29 DIAGNOSIS — E11.69 DM TYPE 2 WITH DIABETIC MIXED HYPERLIPIDEMIA: ICD-10-CM

## 2024-10-29 DIAGNOSIS — Z79.4 TYPE 2 DIABETES MELLITUS WITH MICROALBUMINURIA, WITH LONG-TERM CURRENT USE OF INSULIN: ICD-10-CM

## 2024-10-29 DIAGNOSIS — E66.01 CLASS 2 SEVERE OBESITY WITH SERIOUS COMORBIDITY AND BODY MASS INDEX (BMI) OF 38.0 TO 38.9 IN ADULT, UNSPECIFIED OBESITY TYPE: ICD-10-CM

## 2024-10-29 LAB
ALBUMIN SERPL BCP-MCNC: 2.8 G/DL (ref 3.5–5.2)
ALBUMIN/CREAT UR: NORMAL UG/MG (ref 0–30)
ALP SERPL-CCNC: 108 U/L (ref 40–150)
ALT SERPL W/O P-5'-P-CCNC: 16 U/L (ref 10–44)
ANION GAP SERPL CALC-SCNC: 11 MMOL/L (ref 8–16)
AST SERPL-CCNC: 15 U/L (ref 10–40)
BILIRUB SERPL-MCNC: 0.4 MG/DL (ref 0.1–1)
BUN SERPL-MCNC: 13 MG/DL (ref 8–23)
CALCIUM SERPL-MCNC: 8.1 MG/DL (ref 8.7–10.5)
CHLORIDE SERPL-SCNC: 102 MMOL/L (ref 95–110)
CHOLEST SERPL-MCNC: 108 MG/DL (ref 120–199)
CHOLEST/HDLC SERPL: 4.3 {RATIO} (ref 2–5)
CO2 SERPL-SCNC: 27 MMOL/L (ref 23–29)
CREAT SERPL-MCNC: 1 MG/DL (ref 0.5–1.4)
CREAT UR-MCNC: 29 MG/DL (ref 15–325)
EST. GFR  (NO RACE VARIABLE): >60 ML/MIN/1.73 M^2
ESTIMATED AVG GLUCOSE: 189 MG/DL (ref 68–131)
GLUCOSE SERPL-MCNC: 419 MG/DL (ref 70–110)
HBA1C MFR BLD: 8.2 % (ref 4–5.6)
HDLC SERPL-MCNC: 25 MG/DL (ref 40–75)
HDLC SERPL: 23.1 % (ref 20–50)
LDLC SERPL CALC-MCNC: 42.6 MG/DL (ref 63–159)
MICROALBUMIN UR DL<=1MG/L-MCNC: <5 UG/ML
NONHDLC SERPL-MCNC: 83 MG/DL
POTASSIUM SERPL-SCNC: 3.6 MMOL/L (ref 3.5–5.1)
PROT SERPL-MCNC: 6.3 G/DL (ref 6–8.4)
SODIUM SERPL-SCNC: 140 MMOL/L (ref 136–145)
TRIGL SERPL-MCNC: 202 MG/DL (ref 30–150)

## 2024-10-29 PROCEDURE — 82570 ASSAY OF URINE CREATININE: CPT | Mod: HCNC | Performed by: NURSE PRACTITIONER

## 2024-10-29 PROCEDURE — 83036 HEMOGLOBIN GLYCOSYLATED A1C: CPT | Mod: HCNC | Performed by: NURSE PRACTITIONER

## 2024-10-29 PROCEDURE — 82043 UR ALBUMIN QUANTITATIVE: CPT | Mod: HCNC | Performed by: NURSE PRACTITIONER

## 2024-10-29 PROCEDURE — 80061 LIPID PANEL: CPT | Mod: HCNC | Performed by: NURSE PRACTITIONER

## 2024-10-29 PROCEDURE — 36415 COLL VENOUS BLD VENIPUNCTURE: CPT | Mod: HCNC,PO | Performed by: NURSE PRACTITIONER

## 2024-10-29 PROCEDURE — 99999 PR PBB SHADOW E&M-EST. PATIENT-LVL V: CPT | Mod: PBBFAC,HCNC,, | Performed by: NURSE PRACTITIONER

## 2024-10-29 PROCEDURE — 80053 COMPREHEN METABOLIC PANEL: CPT | Mod: HCNC | Performed by: NURSE PRACTITIONER

## 2024-10-29 RX ORDER — INSULIN GLARGINE 100 [IU]/ML
24 INJECTION, SOLUTION SUBCUTANEOUS NIGHTLY
Start: 2024-10-29

## 2024-10-29 RX ORDER — CARVEDILOL 6.25 MG/1
6.25 TABLET ORAL NIGHTLY
COMMUNITY

## 2024-10-29 RX ORDER — LOSARTAN POTASSIUM 25 MG/1
25 TABLET ORAL 2 TIMES DAILY
COMMUNITY

## 2024-11-01 ENCOUNTER — TELEPHONE (OUTPATIENT)
Dept: PRIMARY CARE CLINIC | Facility: CLINIC | Age: 64
End: 2024-11-01
Payer: MEDICARE

## 2024-11-19 DIAGNOSIS — Z79.4 TYPE 2 DIABETES MELLITUS WITH DIABETIC POLYNEUROPATHY, WITH LONG-TERM CURRENT USE OF INSULIN: ICD-10-CM

## 2024-11-19 DIAGNOSIS — E11.42 TYPE 2 DIABETES MELLITUS WITH DIABETIC POLYNEUROPATHY, WITH LONG-TERM CURRENT USE OF INSULIN: ICD-10-CM

## 2024-11-19 DIAGNOSIS — E11.65 TYPE 2 DIABETES MELLITUS WITH HYPERGLYCEMIA, WITH LONG-TERM CURRENT USE OF INSULIN: ICD-10-CM

## 2024-11-19 DIAGNOSIS — Z79.4 TYPE 2 DIABETES MELLITUS WITH HYPERGLYCEMIA, WITH LONG-TERM CURRENT USE OF INSULIN: ICD-10-CM

## 2024-11-25 DIAGNOSIS — G47.00 INSOMNIA, UNSPECIFIED TYPE: Primary | ICD-10-CM

## 2024-11-26 RX ORDER — TEMAZEPAM 30 MG/1
CAPSULE ORAL
Qty: 30 CAPSULE | Refills: 2 | Status: SHIPPED | OUTPATIENT
Start: 2024-11-26

## 2024-12-05 DIAGNOSIS — F41.0 PANIC DISORDER: ICD-10-CM

## 2024-12-05 RX ORDER — ALPRAZOLAM 1 MG/1
1 TABLET ORAL 2 TIMES DAILY
Qty: 60 TABLET | Refills: 3 | Status: SHIPPED | OUTPATIENT
Start: 2024-12-05 | End: 2025-04-04

## 2024-12-10 ENCOUNTER — OFFICE VISIT (OUTPATIENT)
Dept: PRIMARY CARE CLINIC | Facility: CLINIC | Age: 64
End: 2024-12-10
Payer: MEDICARE

## 2024-12-10 VITALS
WEIGHT: 205.69 LBS | RESPIRATION RATE: 17 BRPM | OXYGEN SATURATION: 92 % | DIASTOLIC BLOOD PRESSURE: 82 MMHG | HEIGHT: 61 IN | HEART RATE: 46 BPM | SYSTOLIC BLOOD PRESSURE: 115 MMHG | BODY MASS INDEX: 38.83 KG/M2

## 2024-12-10 DIAGNOSIS — I50.32 CHRONIC DIASTOLIC HEART FAILURE: ICD-10-CM

## 2024-12-10 DIAGNOSIS — R11.0 NAUSEA: ICD-10-CM

## 2024-12-10 DIAGNOSIS — Z79.4 TYPE 2 DIABETES MELLITUS WITH DIABETIC POLYNEUROPATHY, WITH LONG-TERM CURRENT USE OF INSULIN: ICD-10-CM

## 2024-12-10 DIAGNOSIS — J44.9 CHRONIC OBSTRUCTIVE PULMONARY DISEASE, UNSPECIFIED COPD TYPE: ICD-10-CM

## 2024-12-10 DIAGNOSIS — Z23 NEED FOR VACCINATION: ICD-10-CM

## 2024-12-10 DIAGNOSIS — I10 HYPERTENSION, UNSPECIFIED TYPE: Primary | ICD-10-CM

## 2024-12-10 DIAGNOSIS — E11.42 TYPE 2 DIABETES MELLITUS WITH DIABETIC POLYNEUROPATHY, WITH LONG-TERM CURRENT USE OF INSULIN: ICD-10-CM

## 2024-12-10 PROBLEM — R79.89 TROPONIN LEVEL ELEVATED: Status: RESOLVED | Noted: 2019-02-22 | Resolved: 2024-12-10

## 2024-12-10 PROBLEM — I31.9 PERICARDITIS: Status: RESOLVED | Noted: 2019-02-17 | Resolved: 2024-12-10

## 2024-12-10 PROCEDURE — 1159F MED LIST DOCD IN RCRD: CPT | Mod: CPTII,S$GLB,, | Performed by: PHYSICIAN ASSISTANT

## 2024-12-10 PROCEDURE — 90471 IMMUNIZATION ADMIN: CPT | Mod: S$GLB,,, | Performed by: PHYSICIAN ASSISTANT

## 2024-12-10 PROCEDURE — 3052F HG A1C>EQUAL 8.0%<EQUAL 9.0%: CPT | Mod: CPTII,S$GLB,, | Performed by: PHYSICIAN ASSISTANT

## 2024-12-10 PROCEDURE — 3061F NEG MICROALBUMINURIA REV: CPT | Mod: CPTII,S$GLB,, | Performed by: PHYSICIAN ASSISTANT

## 2024-12-10 PROCEDURE — 4010F ACE/ARB THERAPY RXD/TAKEN: CPT | Mod: CPTII,S$GLB,, | Performed by: PHYSICIAN ASSISTANT

## 2024-12-10 PROCEDURE — 3288F FALL RISK ASSESSMENT DOCD: CPT | Mod: CPTII,S$GLB,, | Performed by: PHYSICIAN ASSISTANT

## 2024-12-10 PROCEDURE — 99214 OFFICE O/P EST MOD 30 MIN: CPT | Mod: 25,S$GLB,, | Performed by: PHYSICIAN ASSISTANT

## 2024-12-10 PROCEDURE — 90750 HZV VACC RECOMBINANT IM: CPT | Mod: S$GLB,,, | Performed by: PHYSICIAN ASSISTANT

## 2024-12-10 PROCEDURE — 3079F DIAST BP 80-89 MM HG: CPT | Mod: CPTII,S$GLB,, | Performed by: PHYSICIAN ASSISTANT

## 2024-12-10 PROCEDURE — 1101F PT FALLS ASSESS-DOCD LE1/YR: CPT | Mod: CPTII,S$GLB,, | Performed by: PHYSICIAN ASSISTANT

## 2024-12-10 PROCEDURE — 3074F SYST BP LT 130 MM HG: CPT | Mod: CPTII,S$GLB,, | Performed by: PHYSICIAN ASSISTANT

## 2024-12-10 PROCEDURE — 3066F NEPHROPATHY DOC TX: CPT | Mod: CPTII,S$GLB,, | Performed by: PHYSICIAN ASSISTANT

## 2024-12-10 PROCEDURE — 3008F BODY MASS INDEX DOCD: CPT | Mod: CPTII,S$GLB,, | Performed by: PHYSICIAN ASSISTANT

## 2024-12-10 RX ORDER — LANOLIN ALCOHOL/MO/W.PET/CERES
400 CREAM (GRAM) TOPICAL 2 TIMES DAILY
Qty: 180 TABLET | Refills: 3 | Status: SHIPPED | OUTPATIENT
Start: 2024-12-10

## 2024-12-10 RX ORDER — LOSARTAN POTASSIUM 25 MG/1
25 TABLET ORAL 2 TIMES DAILY
Qty: 180 TABLET | Refills: 1 | Status: CANCELLED | OUTPATIENT
Start: 2024-12-10

## 2024-12-10 RX ORDER — ONDANSETRON 4 MG/1
4 TABLET, ORALLY DISINTEGRATING ORAL EVERY 8 HOURS PRN
Qty: 20 TABLET | Refills: 0 | Status: SHIPPED | OUTPATIENT
Start: 2024-12-10

## 2024-12-10 RX ORDER — ALBUTEROL SULFATE 90 UG/1
2 INHALANT RESPIRATORY (INHALATION) EVERY 6 HOURS PRN
Qty: 6.7 G | Refills: 11 | Status: SHIPPED | OUTPATIENT
Start: 2024-12-10

## 2024-12-10 RX ORDER — FUROSEMIDE 20 MG/1
TABLET ORAL
Qty: 180 TABLET | Refills: 1 | Status: SHIPPED | OUTPATIENT
Start: 2024-12-10

## 2024-12-10 RX ORDER — BUDESONIDE AND FORMOTEROL FUMARATE DIHYDRATE 160; 4.5 UG/1; UG/1
2 AEROSOL RESPIRATORY (INHALATION) EVERY 12 HOURS
Qty: 6 G | Refills: 11 | Status: SHIPPED | OUTPATIENT
Start: 2024-12-10 | End: 2025-12-10

## 2024-12-10 RX ORDER — CARVEDILOL 6.25 MG/1
6.25 TABLET ORAL NIGHTLY
Qty: 90 TABLET | Refills: 1 | Status: CANCELLED | OUTPATIENT
Start: 2024-12-10

## 2024-12-10 RX ORDER — PROMETHAZINE HYDROCHLORIDE 25 MG/1
25 TABLET ORAL
Qty: 30 TABLET | Refills: 1 | Status: CANCELLED | OUTPATIENT
Start: 2024-12-10

## 2024-12-10 NOTE — PROGRESS NOTES
Subjective     Patient ID: Amanda Barton is a 64 y.o. female.    Chief Complaint: Follow-up    Patient is a 63 yo female who comes into for a check up.     Patient Active Problem List:     History of non-ST elevation myocardial infarction (NSTEMI)     BJ (obstructive sleep apnea)     Chronic diastolic heart failure     Diastolic dysfunction     Paroxysmal atrial fibrillation     Cough     Sepsis     Febrile illness     Iron deficiency anemia     Class 2 severe obesity with serious comorbidity and body mass index (BMI) of 38.0 to 38.9 in adult, unspecified obesity type     Symptomatic anemia     Constipation     GI bleed     Type 2 diabetes mellitus with hyperglycemia, with long-term current use of insulin     Type 2 diabetes mellitus with diabetic polyneuropathy, with long-term current use of insulin     Type 2 diabetes mellitus with microalbuminuria, with long-term current use of insulin     Chronic obstructive pulmonary disease, unspecified COPD type     Insomnia     Osteoarthritis of lumbar spine     Depression     DM type 2 with diabetic mixed hyperlipidemia     Gastroesophageal reflux disease without esophagitis     Urinary incontinence     Seasonal allergies     Panic disorder     Vitamin D deficiency     Nausea     Restless leg syndrome     Hypertension     Peripheral vascular disease, unspecified     Memory loss     Reviewed her problem list. She is currently being followed by Cardiology, Pain Management,  and Endocrinology. Her daughter is currently in correction and awaiting sentencing. She has had to keep up with her medication on her own.     Past Medical History:  12/02/2017: Acute bacterial pericarditis  No date: Anemia  11/20/2017: Bacteremia due to methicillin resistant Staphylococcus   aureus  No date: Diabetes mellitus  No date: Encounter for blood transfusion  No date: GERD (gastroesophageal reflux disease)  No date: Heart murmur  No date: History of pneumonia  No date: History of UTI  No date:  Hypertension  No date: Migraine headache  02/17/2019: Pericarditis  01/25/2023: Personal history of colonic polyps  No date: Sleep apnea      Comment:  no machine yet  11/19/2017: Type 2 diabetes mellitus without complication, with long-  term current use of insulin        Review of Systems   Constitutional:  Negative for fatigue.   Respiratory:  Positive for shortness of breath and wheezing. Negative for chest tightness.    Cardiovascular:  Negative for chest pain.   Gastrointestinal:  Negative for abdominal pain.   Genitourinary:  Negative for hematuria.   Musculoskeletal:  Positive for arthralgias.          Objective     Physical Exam  Vitals reviewed.   Constitutional:       General: She is not in acute distress.     Appearance: Normal appearance. She is not ill-appearing, toxic-appearing or diaphoretic.   Neck:      Vascular: No carotid bruit.   Cardiovascular:      Rate and Rhythm: Normal rate and regular rhythm.      Pulses: Normal pulses.      Heart sounds: Normal heart sounds. No murmur heard.     No friction rub. No gallop.   Pulmonary:      Effort: Pulmonary effort is normal. No respiratory distress.      Breath sounds: Normal breath sounds. No stridor. No wheezing, rhonchi or rales.   Chest:      Chest wall: No tenderness.   Abdominal:      Palpations: Abdomen is soft.      Tenderness: There is no abdominal tenderness.   Musculoskeletal:         General: No swelling or tenderness.      Cervical back: No rigidity or tenderness.   Lymphadenopathy:      Cervical: No cervical adenopathy.   Skin:     General: Skin is warm and dry.   Neurological:      Mental Status: She is alert.       Lab Results   Component Value Date    WBC 7.45 10/08/2024    HGB 11.6 (L) 10/08/2024    HCT 36.5 (L) 10/08/2024    MCV 90 10/08/2024     10/08/2024     CMP  Sodium   Date Value Ref Range Status   10/29/2024 140 136 - 145 mmol/L Final     Potassium   Date Value Ref Range Status   10/29/2024 3.6 3.5 - 5.1 mmol/L Final      Chloride   Date Value Ref Range Status   10/29/2024 102 95 - 110 mmol/L Final     CO2   Date Value Ref Range Status   10/29/2024 27 23 - 29 mmol/L Final     Glucose   Date Value Ref Range Status   10/29/2024 419 (H) 70 - 110 mg/dL Final     BUN   Date Value Ref Range Status   10/29/2024 13 8 - 23 mg/dL Final     Creatinine   Date Value Ref Range Status   10/29/2024 1.0 0.5 - 1.4 mg/dL Final     Calcium   Date Value Ref Range Status   10/29/2024 8.1 (L) 8.7 - 10.5 mg/dL Final     Total Protein   Date Value Ref Range Status   10/29/2024 6.3 6.0 - 8.4 g/dL Final     Albumin   Date Value Ref Range Status   10/29/2024 2.8 (L) 3.5 - 5.2 g/dL Final     Total Bilirubin   Date Value Ref Range Status   10/29/2024 0.4 0.1 - 1.0 mg/dL Final     Comment:     For infants and newborns, interpretation of results should be based  on gestational age, weight and in agreement with clinical  observations.    Premature Infant recommended reference ranges:  Up to 24 hours.............<8.0 mg/dL  Up to 48 hours............<12.0 mg/dL  3-5 days..................<15.0 mg/dL  6-29 days.................<15.0 mg/dL       Alkaline Phosphatase   Date Value Ref Range Status   10/29/2024 108 40 - 150 U/L Final     AST   Date Value Ref Range Status   10/29/2024 15 10 - 40 U/L Final     ALT   Date Value Ref Range Status   10/29/2024 16 10 - 44 U/L Final     Anion Gap   Date Value Ref Range Status   10/29/2024 11 8 - 16 mmol/L Final     eGFR if    Date Value Ref Range Status   11/21/2019 >60 >60 mL/min/1.73 m^2 Final     eGFR if non    Date Value Ref Range Status   11/21/2019 >60 >60 mL/min/1.73 m^2 Final     Comment:     Calculation used to obtain the estimated glomerular filtration  rate (eGFR) is the CKD-EPI equation.        Lab Results   Component Value Date    CHOL 108 (L) 10/29/2024     Lab Results   Component Value Date    HDL 25 (L) 10/29/2024     Lab Results   Component Value Date    LDLCALC 42.6 (L)  10/29/2024     Lab Results   Component Value Date    TRIG 202 (H) 10/29/2024     Lab Results   Component Value Date    CHOLHDL 23.1 10/29/2024     Lab Results   Component Value Date    HGBA1C 8.2 (H) 10/29/2024           Assessment and Plan     1. Hypertension, unspecified type  controlled    2. Nausea    3. Chronic obstructive pulmonary disease, unspecified COPD type  -     budesonide-formoterol 160-4.5 mcg (SYMBICORT) 160-4.5 mcg/actuation HFAA; Inhale 2 puffs into the lungs every 12 (twelve) hours. Controller  Dispense: 6 g; Refill: 11  -     albuterol (VENTOLIN HFA) 90 mcg/actuation inhaler; Inhale 2 puffs into the lungs every 6 (six) hours as needed for Wheezing (Rescue Inhaler). Rescue  Dispense: 6.7 g; Refill: 11    4. Type 2 diabetes mellitus with diabetic polyneuropathy, with long-term current use of insulin  -     Discontinue: tirzepatide 7.5 mg/0.5 mL PnIj; Inject 10 mg into the skin every 7 days.  Dispense: 4 Pen; Refill: 5  -     tirzepatide 10 mg/0.5 mL PnIj; Inject 10 mg into the skin every 7 days.  Dispense: 4 Pen; Refill: 5    5. Chronic diastolic heart failure  -     furosemide (LASIX) 20 MG tablet; Take 3 times a week  Dispense: 180 tablet; Refill: 1    6. Need for vaccination  -     varicella zoster (Shingrix) IM vaccine (>/= 49 yo)    Other orders  -     magnesium oxide (MAG-OX) 400 mg (241.3 mg magnesium) tablet; Take 1 tablet (400 mg total) by mouth 2 (two) times daily.  Dispense: 180 tablet; Refill: 3  -     ondansetron (ZOFRAN-ODT) 4 MG TbDL; Take 1 tablet (4 mg total) by mouth every 8 (eight) hours as needed.  Dispense: 20 tablet; Refill: 0        I spent 30 minutes on this encounter, time includes face-to-face, chart review, documentation, test review and orders.

## 2024-12-13 ENCOUNTER — TELEPHONE (OUTPATIENT)
Dept: PRIMARY CARE CLINIC | Facility: CLINIC | Age: 64
End: 2024-12-13
Payer: MEDICARE

## 2024-12-13 RX ORDER — PROMETHAZINE HYDROCHLORIDE AND DEXTROMETHORPHAN HYDROBROMIDE 6.25; 15 MG/5ML; MG/5ML
5 SYRUP ORAL 3 TIMES DAILY PRN
Qty: 150 ML | Refills: 0 | Status: SHIPPED | OUTPATIENT
Start: 2024-12-13 | End: 2024-12-23

## 2024-12-13 NOTE — TELEPHONE ENCOUNTER
----- Message from Harper sent at 12/13/2024 10:21 AM CST -----  Type:  Needs Medical Advice    Who Called: pt    Symptoms (please be specific): cough     How long has patient had these symptoms:  3 days    Pharmacy name and phone #:    [facility] [place] - [address]  [address] [facility] [address]  Phone: [phone] Fax: [phone]    ,    Would the patient rather a call back or a response via [facility]? Call back    Best Call Back Number: [phone]      Additional Information: pt is requesting a call to discuss her symptoms.  Please advise. Thank you!

## 2025-01-03 RX ORDER — MUPIROCIN 20 MG/G
OINTMENT TOPICAL 3 TIMES DAILY
Qty: 30 EACH | Refills: 11 | Status: SHIPPED | OUTPATIENT
Start: 2025-01-03

## 2025-01-10 ENCOUNTER — TELEPHONE (OUTPATIENT)
Dept: PRIMARY CARE CLINIC | Facility: CLINIC | Age: 65
End: 2025-01-10
Payer: MEDICARE

## 2025-01-10 NOTE — TELEPHONE ENCOUNTER
Pt stated that the pharmacist told her that since she is currently on pain medication along with the Xanax that they would need a provider to override her medication so her insurance will pay for her xanax. Pt stated that she paid for her xanax prescription this time.

## 2025-01-10 NOTE — TELEPHONE ENCOUNTER
----- Message from Blanche sent at 1/10/2025  9:11 AM CST -----  Contact: Self  Type: Needs Medical Advice  Who Called:  Patient  Best Call Back Number: [phone]    Additional Information: Pt is calling in regards to one of her prescriptions stating she is having issues and is asking if Chelsi is there to call her or anyone in the clinic to see about getting this straight. Can we please call pt back to discuss. Thank You

## 2025-01-30 DIAGNOSIS — Z00.00 ENCOUNTER FOR MEDICARE ANNUAL WELLNESS EXAM: ICD-10-CM

## 2025-02-07 ENCOUNTER — OFFICE VISIT (OUTPATIENT)
Dept: PRIMARY CARE CLINIC | Facility: CLINIC | Age: 65
End: 2025-02-07
Payer: MEDICARE

## 2025-02-07 ENCOUNTER — TELEPHONE (OUTPATIENT)
Dept: EMERGENCY MEDICINE | Facility: HOSPITAL | Age: 65
End: 2025-02-07
Payer: MEDICARE

## 2025-02-07 VITALS
DIASTOLIC BLOOD PRESSURE: 80 MMHG | HEIGHT: 61 IN | WEIGHT: 198 LBS | SYSTOLIC BLOOD PRESSURE: 118 MMHG | BODY MASS INDEX: 37.38 KG/M2 | OXYGEN SATURATION: 97 % | HEART RATE: 83 BPM | RESPIRATION RATE: 17 BRPM

## 2025-02-07 DIAGNOSIS — E66.01 CLASS 2 SEVERE OBESITY WITH SERIOUS COMORBIDITY AND BODY MASS INDEX (BMI) OF 38.0 TO 38.9 IN ADULT, UNSPECIFIED OBESITY TYPE: ICD-10-CM

## 2025-02-07 DIAGNOSIS — E66.812 CLASS 2 SEVERE OBESITY WITH SERIOUS COMORBIDITY AND BODY MASS INDEX (BMI) OF 38.0 TO 38.9 IN ADULT, UNSPECIFIED OBESITY TYPE: ICD-10-CM

## 2025-02-07 DIAGNOSIS — E11.42 TYPE 2 DIABETES MELLITUS WITH DIABETIC POLYNEUROPATHY, WITH LONG-TERM CURRENT USE OF INSULIN: ICD-10-CM

## 2025-02-07 DIAGNOSIS — R53.83 FATIGUE, UNSPECIFIED TYPE: Primary | ICD-10-CM

## 2025-02-07 DIAGNOSIS — Z79.4 TYPE 2 DIABETES MELLITUS WITH DIABETIC POLYNEUROPATHY, WITH LONG-TERM CURRENT USE OF INSULIN: ICD-10-CM

## 2025-02-07 DIAGNOSIS — D64.9 ANEMIA, UNSPECIFIED TYPE: ICD-10-CM

## 2025-02-07 DIAGNOSIS — I50.32 CHRONIC DIASTOLIC HEART FAILURE: ICD-10-CM

## 2025-02-07 LAB
ALBUMIN SERPL BCP-MCNC: 3.3 G/DL (ref 3.5–5.2)
ALP SERPL-CCNC: 119 U/L (ref 40–150)
ALT SERPL W/O P-5'-P-CCNC: 11 U/L (ref 10–44)
ANION GAP SERPL CALC-SCNC: 18 MMOL/L (ref 8–16)
AST SERPL-CCNC: 11 U/L (ref 10–40)
BASOPHILS # BLD AUTO: 0.04 K/UL (ref 0–0.2)
BASOPHILS NFR BLD: 0.5 % (ref 0–1.9)
BILIRUB SERPL-MCNC: 0.5 MG/DL (ref 0.1–1)
BUN SERPL-MCNC: 10 MG/DL (ref 8–23)
CALCIUM SERPL-MCNC: 8.1 MG/DL (ref 8.7–10.5)
CHLORIDE SERPL-SCNC: 91 MMOL/L (ref 95–110)
CO2 SERPL-SCNC: 29 MMOL/L (ref 23–29)
CREAT SERPL-MCNC: 1.2 MG/DL (ref 0.5–1.4)
DIFFERENTIAL METHOD BLD: ABNORMAL
EOSINOPHIL # BLD AUTO: 0.1 K/UL (ref 0–0.5)
EOSINOPHIL NFR BLD: 1.2 % (ref 0–8)
ERYTHROCYTE [DISTWIDTH] IN BLOOD BY AUTOMATED COUNT: 14.8 % (ref 11.5–14.5)
EST. GFR  (NO RACE VARIABLE): 50.2 ML/MIN/1.73 M^2
FERRITIN SERPL-MCNC: 25 NG/ML (ref 20–300)
GLUCOSE SERPL-MCNC: 361 MG/DL (ref 70–110)
HCT VFR BLD AUTO: 31.9 % (ref 37–48.5)
HGB BLD-MCNC: 9.7 G/DL (ref 12–16)
IMM GRANULOCYTES # BLD AUTO: 0.01 K/UL (ref 0–0.04)
IMM GRANULOCYTES NFR BLD AUTO: 0.1 % (ref 0–0.5)
LYMPHOCYTES # BLD AUTO: 0.9 K/UL (ref 1–4.8)
LYMPHOCYTES NFR BLD: 11.9 % (ref 18–48)
MCH RBC QN AUTO: 24.6 PG (ref 27–31)
MCHC RBC AUTO-ENTMCNC: 30.4 G/DL (ref 32–36)
MCV RBC AUTO: 81 FL (ref 82–98)
MONOCYTES # BLD AUTO: 0.4 K/UL (ref 0.3–1)
MONOCYTES NFR BLD: 5.5 % (ref 4–15)
NEUTROPHILS # BLD AUTO: 6.1 K/UL (ref 1.8–7.7)
NEUTROPHILS NFR BLD: 80.8 % (ref 38–73)
NRBC BLD-RTO: 0 /100 WBC
PLATELET # BLD AUTO: 285 K/UL (ref 150–450)
PMV BLD AUTO: 11.2 FL (ref 9.2–12.9)
POTASSIUM SERPL-SCNC: 2.6 MMOL/L (ref 3.5–5.1)
PROT SERPL-MCNC: 6.9 G/DL (ref 6–8.4)
RBC # BLD AUTO: 3.95 M/UL (ref 4–5.4)
SODIUM SERPL-SCNC: 138 MMOL/L (ref 136–145)
TSH SERPL DL<=0.005 MIU/L-ACNC: 0.65 UIU/ML (ref 0.4–4)
WBC # BLD AUTO: 7.51 K/UL (ref 3.9–12.7)

## 2025-02-07 PROCEDURE — 36415 COLL VENOUS BLD VENIPUNCTURE: CPT | Mod: S$GLB,,, | Performed by: PHYSICIAN ASSISTANT

## 2025-02-07 PROCEDURE — 1160F RVW MEDS BY RX/DR IN RCRD: CPT | Mod: CPTII,S$GLB,, | Performed by: PHYSICIAN ASSISTANT

## 2025-02-07 PROCEDURE — 80053 COMPREHEN METABOLIC PANEL: CPT | Mod: HCNC | Performed by: PHYSICIAN ASSISTANT

## 2025-02-07 PROCEDURE — 3074F SYST BP LT 130 MM HG: CPT | Mod: CPTII,S$GLB,, | Performed by: PHYSICIAN ASSISTANT

## 2025-02-07 PROCEDURE — 84443 ASSAY THYROID STIM HORMONE: CPT | Mod: HCNC | Performed by: PHYSICIAN ASSISTANT

## 2025-02-07 PROCEDURE — 1126F AMNT PAIN NOTED NONE PRSNT: CPT | Mod: CPTII,S$GLB,, | Performed by: PHYSICIAN ASSISTANT

## 2025-02-07 PROCEDURE — 3008F BODY MASS INDEX DOCD: CPT | Mod: CPTII,S$GLB,, | Performed by: PHYSICIAN ASSISTANT

## 2025-02-07 PROCEDURE — 82728 ASSAY OF FERRITIN: CPT | Mod: HCNC | Performed by: PHYSICIAN ASSISTANT

## 2025-02-07 PROCEDURE — 1101F PT FALLS ASSESS-DOCD LE1/YR: CPT | Mod: CPTII,S$GLB,, | Performed by: PHYSICIAN ASSISTANT

## 2025-02-07 PROCEDURE — 99214 OFFICE O/P EST MOD 30 MIN: CPT | Mod: S$GLB,,, | Performed by: PHYSICIAN ASSISTANT

## 2025-02-07 PROCEDURE — 85025 COMPLETE CBC W/AUTO DIFF WBC: CPT | Mod: HCNC | Performed by: PHYSICIAN ASSISTANT

## 2025-02-07 PROCEDURE — 1159F MED LIST DOCD IN RCRD: CPT | Mod: CPTII,S$GLB,, | Performed by: PHYSICIAN ASSISTANT

## 2025-02-07 PROCEDURE — 3079F DIAST BP 80-89 MM HG: CPT | Mod: CPTII,S$GLB,, | Performed by: PHYSICIAN ASSISTANT

## 2025-02-07 PROCEDURE — 3288F FALL RISK ASSESSMENT DOCD: CPT | Mod: CPTII,S$GLB,, | Performed by: PHYSICIAN ASSISTANT

## 2025-02-07 RX ORDER — SULFAMETHOXAZOLE AND TRIMETHOPRIM 800; 160 MG/1; MG/1
1 TABLET ORAL 2 TIMES DAILY
Qty: 14 TABLET | Refills: 0 | Status: SHIPPED | OUTPATIENT
Start: 2025-02-07 | End: 2025-02-14

## 2025-02-07 NOTE — PROGRESS NOTES
Subjective     Patient ID: Amanda Barton is a 64 y.o. female.    Chief Complaint: No chief complaint on file.    Fatigue  This is a new problem. The current episode started 1 to 4 weeks ago. The problem occurs constantly. The problem has been unchanged. Associated symptoms include arthralgias and fatigue. Pertinent negatives include no abdominal pain, anorexia, change in bowel habit, chest pain, chills or coughing. The symptoms are aggravated by exertion and standing. She has tried nothing for the symptoms.     Past Medical History:   Diagnosis Date    Acute bacterial pericarditis 12/02/2017    Anemia     Bacteremia due to methicillin resistant Staphylococcus aureus 11/20/2017    Diabetes mellitus     Encounter for blood transfusion     GERD (gastroesophageal reflux disease)     Heart murmur     History of pneumonia     History of UTI     Hypertension     Migraine headache     Pericarditis 02/17/2019    Personal history of colonic polyps 01/25/2023    Sleep apnea     no machine yet    Type 2 diabetes mellitus without complication, with long-term current use of insulin 11/19/2017       Review of Systems   Constitutional:  Positive for fatigue. Negative for chills.   Respiratory:  Negative for cough and chest tightness.    Cardiovascular:  Negative for chest pain.   Gastrointestinal:  Negative for abdominal pain, anorexia and change in bowel habit.   Musculoskeletal:  Positive for arthralgias.          Objective     Physical Exam  Vitals reviewed.   Constitutional:       General: She is not in acute distress.     Appearance: Normal appearance. She is not ill-appearing, toxic-appearing or diaphoretic.   Cardiovascular:      Rate and Rhythm: Normal rate and regular rhythm.      Pulses: Normal pulses.      Heart sounds: Normal heart sounds. No murmur heard.     No friction rub. No gallop.   Pulmonary:      Effort: Pulmonary effort is normal. No respiratory distress.      Breath sounds: Normal breath sounds. No stridor.  No wheezing, rhonchi or rales.   Chest:      Chest wall: No tenderness.   Abdominal:      Palpations: Abdomen is soft.      Tenderness: There is no abdominal tenderness.   Skin:     Findings: Erythema and lesion present.          Neurological:      Mental Status: She is alert.            Assessment and Plan     1. Anemia, unspecified type  -     CBC Auto Differential; Future; Expected date: 02/07/2025  -     Ferritin; Future; Expected date: 02/07/2025    2. Fatigue, unspecified type  -     Comprehensive Metabolic Panel; Future; Expected date: 02/07/2025  -     TSH; Future; Expected date: 02/07/2025    Other orders  -     sulfamethoxazole-trimethoprim 800-160mg (BACTRIM DS) 800-160 mg Tab; Take 1 tablet by mouth 2 (two) times daily. for 7 days  Dispense: 14 tablet; Refill: 0        I spent 30 minutes on this encounter, time includes face-to-face, chart review, documentation, test review and orders.           No follow-ups on file.

## 2025-02-08 NOTE — TELEPHONE ENCOUNTER
I was notified of critical lab result showing a potassium of 2.6.  Contacted patient and notified her of results.  She had seen her PCP today for fatigue.  Since patient is symptomatic, I did advise her to go to the ED for evaluation.  Chart review did show a prior prescription for potassium, but patient states that she does not currently have a prescription for potassium supplement.

## 2025-02-10 ENCOUNTER — PATIENT MESSAGE (OUTPATIENT)
Dept: PRIMARY CARE CLINIC | Facility: CLINIC | Age: 65
End: 2025-02-10
Payer: MEDICARE

## 2025-02-10 DIAGNOSIS — K58.9 IRRITABLE BOWEL SYNDROME, UNSPECIFIED TYPE: Primary | ICD-10-CM

## 2025-02-10 NOTE — LETTER
AUTHORIZATION FOR RELEASE OF   CONFIDENTIAL INFORMATION    Dear [facility],    We are seeing Amanda Barton, date of birth 1960, in the clinic at [facility]. Fer Herring III, PA-C is the patient's PCP. Amanda Barton has follow up appointment from her recent ED visit. In order to help keep her health information updated, she has authorized us to request the following medical record(s):        (  )  MAMMOGRAM                                      (  )  COLONOSCOPY      (  )  PAP SMEAR                                          ( X )  OUTSIDE LAB RESULTS     (  )  DEXA SCAN                                          ( X )  IMAGING           (  )  FOOT EXAM                                          (  )  ENTIRE RECORD     (  )  OUTSIDE IMMUNIZATIONS                 ( X )  ED NOTE         Please fax records to Fer Herring III, PA-C, [phone]     If you have any questions, please contact us at [phone].           Patient Name: Amanda Barton  : 1960  Patient Phone #: [phone]

## 2025-02-11 NOTE — TELEPHONE ENCOUNTER
It was documented in the chart that someone spoke to you over the weekend and advised you go the ER. I recommend going to the ER and have your potassium rechecked. If is is truly that low, you will need IV potassium

## 2025-02-13 NOTE — TELEPHONE ENCOUNTER
JEAN MARIE done in [facility] and printed. Unable to fax to [facility], fax is down. In Juliann's folder to fax once fixed.

## 2025-02-14 ENCOUNTER — PATIENT OUTREACH (OUTPATIENT)
Dept: ADMINISTRATIVE | Facility: HOSPITAL | Age: 65
End: 2025-02-14
Payer: MEDICARE

## 2025-02-14 NOTE — PROGRESS NOTES
Left message to return call to clinic  Pt due for labs and dexa  Usually done at [facility]  No orders noted  Portal message sent

## 2025-02-17 ENCOUNTER — RESULTS FOLLOW-UP (OUTPATIENT)
Dept: PRIMARY CARE CLINIC | Facility: CLINIC | Age: 65
End: 2025-02-17
Payer: MEDICARE

## 2025-02-17 NOTE — TELEPHONE ENCOUNTER
----- Message from Fer Herring PA-C sent at 2/10/2025  7:05 AM CST -----  Ms Patel,     Did you go to the ER for evaluation?

## 2025-02-17 NOTE — TELEPHONE ENCOUNTER
Tried to call pt for a follow up to see how she was doing but pt did not answer. Will try again later.

## 2025-02-18 ENCOUNTER — TELEPHONE (OUTPATIENT)
Dept: PRIMARY CARE CLINIC | Facility: CLINIC | Age: 65
End: 2025-02-18
Payer: MEDICARE

## 2025-02-21 ENCOUNTER — TELEPHONE (OUTPATIENT)
Dept: FAMILY MEDICINE | Facility: CLINIC | Age: 65
End: 2025-02-21
Payer: MEDICARE

## 2025-02-21 ENCOUNTER — OFFICE VISIT (OUTPATIENT)
Dept: PRIMARY CARE CLINIC | Facility: CLINIC | Age: 65
End: 2025-02-21
Payer: MEDICARE

## 2025-02-21 VITALS
HEART RATE: 100 BPM | RESPIRATION RATE: 16 BRPM | WEIGHT: 200.81 LBS | OXYGEN SATURATION: 99 % | HEIGHT: 61 IN | DIASTOLIC BLOOD PRESSURE: 82 MMHG | BODY MASS INDEX: 37.91 KG/M2 | SYSTOLIC BLOOD PRESSURE: 124 MMHG

## 2025-02-21 DIAGNOSIS — Z79.4 TYPE 2 DIABETES MELLITUS WITH HYPERGLYCEMIA, WITH LONG-TERM CURRENT USE OF INSULIN: Primary | ICD-10-CM

## 2025-02-21 DIAGNOSIS — R53.83 FATIGUE, UNSPECIFIED TYPE: ICD-10-CM

## 2025-02-21 DIAGNOSIS — E83.42 HYPOMAGNESEMIA: ICD-10-CM

## 2025-02-21 DIAGNOSIS — D64.9 ANEMIA, UNSPECIFIED TYPE: ICD-10-CM

## 2025-02-21 DIAGNOSIS — E11.65 TYPE 2 DIABETES MELLITUS WITH HYPERGLYCEMIA, WITH LONG-TERM CURRENT USE OF INSULIN: Primary | ICD-10-CM

## 2025-02-21 LAB
ALBUMIN SERPL BCP-MCNC: 3.3 G/DL (ref 3.5–5.2)
ALP SERPL-CCNC: 148 U/L (ref 40–150)
ALT SERPL W/O P-5'-P-CCNC: 16 U/L (ref 10–44)
ANION GAP SERPL CALC-SCNC: 14 MMOL/L (ref 8–16)
AST SERPL-CCNC: 28 U/L (ref 10–40)
BASOPHILS # BLD AUTO: 0.03 K/UL (ref 0–0.2)
BASOPHILS NFR BLD: 0.4 % (ref 0–1.9)
BILIRUB SERPL-MCNC: 0.4 MG/DL (ref 0.1–1)
BUN SERPL-MCNC: 10 MG/DL (ref 8–23)
CALCIUM SERPL-MCNC: 9.5 MG/DL (ref 8.7–10.5)
CHLORIDE SERPL-SCNC: 98 MMOL/L (ref 95–110)
CO2 SERPL-SCNC: 28 MMOL/L (ref 23–29)
CORTIS SERPL-MCNC: 13.2 UG/DL (ref 4.3–22.4)
CREAT SERPL-MCNC: 1.1 MG/DL (ref 0.5–1.4)
DIFFERENTIAL METHOD BLD: ABNORMAL
EOSINOPHIL # BLD AUTO: 0.1 K/UL (ref 0–0.5)
EOSINOPHIL NFR BLD: 1.2 % (ref 0–8)
ERYTHROCYTE [DISTWIDTH] IN BLOOD BY AUTOMATED COUNT: 15 % (ref 11.5–14.5)
EST. GFR  (NO RACE VARIABLE): 55.8 ML/MIN/1.73 M^2
ESTIMATED AVG GLUCOSE: 177 MG/DL (ref 68–131)
FERRITIN SERPL-MCNC: 26 NG/ML (ref 20–300)
GLUCOSE SERPL-MCNC: 110 MG/DL (ref 70–110)
HBA1C MFR BLD: 7.8 % (ref 4–5.6)
HCT VFR BLD AUTO: 37.7 % (ref 37–48.5)
HGB BLD-MCNC: 11.2 G/DL (ref 12–16)
IMM GRANULOCYTES # BLD AUTO: 0.04 K/UL (ref 0–0.04)
IMM GRANULOCYTES NFR BLD AUTO: 0.5 % (ref 0–0.5)
LYMPHOCYTES # BLD AUTO: 1.5 K/UL (ref 1–4.8)
LYMPHOCYTES NFR BLD: 19 % (ref 18–48)
MAGNESIUM SERPL-MCNC: 1.8 MG/DL (ref 1.6–2.6)
MCH RBC QN AUTO: 23.8 PG (ref 27–31)
MCHC RBC AUTO-ENTMCNC: 29.7 G/DL (ref 32–36)
MCV RBC AUTO: 80 FL (ref 82–98)
MONOCYTES # BLD AUTO: 0.4 K/UL (ref 0.3–1)
MONOCYTES NFR BLD: 4.7 % (ref 4–15)
NEUTROPHILS # BLD AUTO: 5.8 K/UL (ref 1.8–7.7)
NEUTROPHILS NFR BLD: 74.2 % (ref 38–73)
NRBC BLD-RTO: 0 /100 WBC
PLATELET # BLD AUTO: 297 K/UL (ref 150–450)
PMV BLD AUTO: 10.5 FL (ref 9.2–12.9)
POTASSIUM SERPL-SCNC: 2.5 MMOL/L (ref 3.5–5.1)
PROT SERPL-MCNC: 7.9 G/DL (ref 6–8.4)
RBC # BLD AUTO: 4.7 M/UL (ref 4–5.4)
SODIUM SERPL-SCNC: 140 MMOL/L (ref 136–145)
WBC # BLD AUTO: 7.79 K/UL (ref 3.9–12.7)

## 2025-02-21 PROCEDURE — 82728 ASSAY OF FERRITIN: CPT | Mod: HCNC | Performed by: PHYSICIAN ASSISTANT

## 2025-02-21 PROCEDURE — 80053 COMPREHEN METABOLIC PANEL: CPT | Mod: HCNC | Performed by: PHYSICIAN ASSISTANT

## 2025-02-21 PROCEDURE — 82533 TOTAL CORTISOL: CPT | Mod: HCNC | Performed by: PHYSICIAN ASSISTANT

## 2025-02-21 PROCEDURE — 83036 HEMOGLOBIN GLYCOSYLATED A1C: CPT | Mod: HCNC | Performed by: PHYSICIAN ASSISTANT

## 2025-02-21 PROCEDURE — 85025 COMPLETE CBC W/AUTO DIFF WBC: CPT | Mod: HCNC | Performed by: PHYSICIAN ASSISTANT

## 2025-02-21 PROCEDURE — 83735 ASSAY OF MAGNESIUM: CPT | Mod: HCNC | Performed by: PHYSICIAN ASSISTANT

## 2025-02-21 RX ORDER — DOXYCYCLINE 100 MG/1
100 CAPSULE ORAL EVERY 12 HOURS
Qty: 14 CAPSULE | Refills: 0 | Status: SHIPPED | OUTPATIENT
Start: 2025-02-21

## 2025-02-21 RX ORDER — INSULIN GLARGINE 100 [IU]/ML
22 INJECTION, SOLUTION SUBCUTANEOUS NIGHTLY
Start: 2025-02-21

## 2025-02-21 NOTE — PROGRESS NOTES
Subjective     Patient ID: Amanda Barton is a 64 y.o. female.    Chief Complaint: Follow-up    Patient is a 65 yo female who comes in today for an ER follow up. She apparently went to [facility] and was found to be hypokalemic, hypomagnesemia, and anemic. She was placed on farxiga which she could not tolerated.      Patient Active Problem List:     History of non-ST elevation myocardial infarction (NSTEMI)     BJ (obstructive sleep apnea)     Chronic diastolic heart failure     Diastolic dysfunction     Paroxysmal atrial fibrillation     Cough     Sepsis     Febrile illness     Iron deficiency anemia     Class 2 severe obesity with serious comorbidity and body mass index (BMI) of 38.0 to 38.9 in adult, unspecified obesity type     Symptomatic anemia     Constipation     GI bleed     Type 2 diabetes mellitus with hyperglycemia, with long-term current use of insulin     Type 2 diabetes mellitus with diabetic polyneuropathy, with long-term current use of insulin     Type 2 diabetes mellitus with microalbuminuria, with long-term current use of insulin     Chronic obstructive pulmonary disease, unspecified COPD type     Insomnia     Osteoarthritis of lumbar spine     Depression     DM type 2 with diabetic mixed hyperlipidemia     Gastroesophageal reflux disease without esophagitis     Urinary incontinence     Seasonal allergies     Panic disorder     Vitamin D deficiency     Nausea     Restless leg syndrome     Hypertension     Peripheral vascular disease, unspecified     Memory loss    Past Medical History:  12/02/2017: Acute bacterial pericarditis  No date: Anemia  11/20/2017: Bacteremia due to methicillin resistant Staphylococcus   aureus  No date: Diabetes mellitus  No date: Encounter for blood transfusion  No date: GERD (gastroesophageal reflux disease)  No date: Heart murmur  No date: History of pneumonia  No date: History of UTI  No date: Hypertension  No date: Migraine headache  02/17/2019: Pericarditis  01/25/2023:  Personal history of colonic polyps  No date: Sleep apnea      Comment:  no machine yet  11/19/2017: Type 2 diabetes mellitus without complication, with long-  term current use of insulin      Follow-up  Pertinent negatives include no abdominal pain, chest pain, chills or fatigue.     Review of Systems   Constitutional:  Negative for chills and fatigue.   Respiratory:  Negative for chest tightness and shortness of breath.    Cardiovascular:  Negative for chest pain.   Gastrointestinal:  Negative for abdominal pain.          Objective     Physical Exam  Vitals reviewed.   Constitutional:       General: She is not in acute distress.     Appearance: Normal appearance. She is not ill-appearing, toxic-appearing or diaphoretic.   HENT:      Head: Normocephalic and atraumatic.   Cardiovascular:      Rate and Rhythm: Normal rate and regular rhythm.      Pulses: Normal pulses.      Heart sounds: Normal heart sounds.   Pulmonary:      Effort: Pulmonary effort is normal.      Breath sounds: Normal breath sounds.   Abdominal:      General: There is no distension.      Palpations: Abdomen is soft.   Skin:     Findings: Abscess and erythema present.          Neurological:      Mental Status: She is alert.            Assessment and Plan     1. Anemia, unspecified type  -     CBC Auto Differential; Future; Expected date: 02/21/2025  -     Ferritin; Future; Expected date: 02/21/2025    2. Type 2 diabetes mellitus with hyperglycemia, with long-term current use of insulin  -     Comprehensive Metabolic Panel; Future; Expected date: 02/21/2025  -     Hemoglobin A1C; Future; Expected date: 02/21/2025  -     insulin glargine U-100, Lantus, (LANTUS SOLOSTAR U-100 INSULIN) 100 unit/mL (3 mL) InPn pen; Inject 22 Units into the skin every evening.    3. Hypomagnesemia  -     Magnesium; Future; Expected date: 02/21/2025    4. Fatigue, unspecified type  -     CORTISOL, 8AM; Future; Expected date: 02/21/2025    Other orders  -     doxycycline  (VIBRAMYCIN) 100 MG Cap; Take 1 capsule (100 mg total) by mouth every 12 (twelve) hours.  Dispense: 14 capsule; Refill: 0        I spent 30 minutes on this encounter, time includes face-to-face, chart review, documentation, test review and orders.           No follow-ups on file.

## 2025-02-21 NOTE — PROGRESS NOTES
Venipuncture performed with 21 gauge butterfly, x's 1 attempt.  Successful venipuncture to L Basilic vein.  Specimens collected per orders.       Pressure dressing applied to site, instructed patient to remove dressing in 10-15 minutes, OK to re-adjust dressing if pressure causing any discomfort, to observe closely for numbness and/or discoloration to hand or fingers, and to notify provider if bleeding persists after applying constant pressure lasting 30 minutes.

## 2025-02-22 NOTE — TELEPHONE ENCOUNTER
Received call from lab about a critical value, K of 2.5.  Review of chart shows patient had K of 2.6 on 2/7.  She was referred to ER.  Per chart notes, she went to [facility].  She was given oral K and sent home.  Saw her PCP (Nevaeh) today in clinic.  Called patient.  She is currently asymptomatic and pleasant.  She prefers not to go back to the ER.  She is on daily K supplement.  I have asked her to take 2 tablets daily including an extra tablet now.  She will continue to take 2 daily until she is contacted by her PCP with a long term plan to recheck and correct her K.

## 2025-02-24 ENCOUNTER — RESULTS FOLLOW-UP (OUTPATIENT)
Dept: PRIMARY CARE CLINIC | Facility: CLINIC | Age: 65
End: 2025-02-24
Payer: MEDICARE

## 2025-02-24 DIAGNOSIS — E87.6 HYPOKALEMIA: Primary | ICD-10-CM

## 2025-02-24 RX ORDER — POTASSIUM CHLORIDE 1.5 G/1.58G
20 POWDER, FOR SOLUTION ORAL 2 TIMES DAILY
Qty: 180 PACKET | Refills: 1 | Status: SHIPPED | OUTPATIENT
Start: 2025-02-24

## 2025-02-24 NOTE — TELEPHONE ENCOUNTER
Spoke to pt to let her know that she can stop the potassium pills once she starts the packets per providers request.

## 2025-02-24 NOTE — TELEPHONE ENCOUNTER
Amanda told me in the visit that she had to cut the potassium pill in to 1/4 and take it that way. Hopefully the packets will digest more efficiently.  She can stop the tablets when she gets the packets.

## 2025-02-24 NOTE — TELEPHONE ENCOUNTER
----- Message from Fer Herring III, PA-C sent at 2/24/2025 11:12 AM CST -----      ----- Message -----  From: Mala Sims MA  Sent: 2/24/2025   9:19 AM CST  To: Fer Herring III, PA-C    Pt states that someone from the lab contacted her and told her to take two potassium pills. Pt is asking if she should continue taking those along with the packets.   ----- Message -----  From: Fer Herring III, PA-C  Sent: 2/24/2025   6:58 AM CST  To: Nevaeh MYERS III Staff    Ms Patel,    The potassium is critically low. Getting this addressed in the ER is the best. But I feel the pill form is not working. I have sent in dissolving packets to use one packet twice daily. Will need to   check your potassium Thursday am.   ----- Message -----  From: Xavier MashON Lab Interface  Sent: 2/21/2025   6:50 PM CST  To: Fer Herring III, PA-C

## 2025-02-25 ENCOUNTER — TELEPHONE (OUTPATIENT)
Dept: PRIMARY CARE CLINIC | Facility: CLINIC | Age: 65
End: 2025-02-25
Payer: MEDICARE

## 2025-02-25 NOTE — TELEPHONE ENCOUNTER
----- Message from Renee sent at 2/25/2025  3:23 PM CST -----   Type:  Needs Medical AdviceWho Called:  PTWould the patient rather a call back or a response via [facility]?  callBest Call Back Number: [phone] Information:  pt asking for a call back from nurse..  Please call back to advise. Thank you!

## 2025-02-27 ENCOUNTER — PATIENT MESSAGE (OUTPATIENT)
Dept: PRIMARY CARE CLINIC | Facility: CLINIC | Age: 65
End: 2025-02-27
Payer: MEDICARE

## 2025-02-27 ENCOUNTER — LAB VISIT (OUTPATIENT)
Dept: PRIMARY CARE CLINIC | Facility: CLINIC | Age: 65
End: 2025-02-27
Payer: MEDICARE

## 2025-02-27 DIAGNOSIS — E87.6 HYPOKALEMIA: ICD-10-CM

## 2025-02-27 LAB
ALBUMIN SERPL BCP-MCNC: 2.9 G/DL (ref 3.5–5.2)
ALP SERPL-CCNC: 133 U/L (ref 40–150)
ALT SERPL W/O P-5'-P-CCNC: 13 U/L (ref 10–44)
ANION GAP SERPL CALC-SCNC: 9 MMOL/L (ref 8–16)
AST SERPL-CCNC: 28 U/L (ref 10–40)
BILIRUB SERPL-MCNC: 0.3 MG/DL (ref 0.1–1)
BUN SERPL-MCNC: 14 MG/DL (ref 8–23)
CALCIUM SERPL-MCNC: 9.1 MG/DL (ref 8.7–10.5)
CHLORIDE SERPL-SCNC: 104 MMOL/L (ref 95–110)
CO2 SERPL-SCNC: 30 MMOL/L (ref 23–29)
CREAT SERPL-MCNC: 0.8 MG/DL (ref 0.5–1.4)
EST. GFR  (NO RACE VARIABLE): >60 ML/MIN/1.73 M^2
GLUCOSE SERPL-MCNC: 114 MG/DL (ref 70–110)
POTASSIUM SERPL-SCNC: 2.7 MMOL/L (ref 3.5–5.1)
PROT SERPL-MCNC: 7.1 G/DL (ref 6–8.4)
SODIUM SERPL-SCNC: 143 MMOL/L (ref 136–145)

## 2025-02-27 PROCEDURE — 80053 COMPREHEN METABOLIC PANEL: CPT | Mod: HCNC | Performed by: PHYSICIAN ASSISTANT

## 2025-02-28 ENCOUNTER — TELEPHONE (OUTPATIENT)
Dept: PRIMARY CARE CLINIC | Facility: CLINIC | Age: 65
End: 2025-02-28
Payer: MEDICARE

## 2025-02-28 ENCOUNTER — RESULTS FOLLOW-UP (OUTPATIENT)
Dept: PRIMARY CARE CLINIC | Facility: CLINIC | Age: 65
End: 2025-02-28

## 2025-02-28 NOTE — TELEPHONE ENCOUNTER
----- Message from Julio C sent at 2/28/2025  1:15 PM CST -----  Contact: Self  Type: Needs Medical AdviceWho Called:  PatientBest Call Back Number: [phone] Information: Patient is requesting a call back as soon as possible regarding [place]

## 2025-02-28 NOTE — TELEPHONE ENCOUNTER
Called [facility] to let them know that pt Is on the way to the ER and that she will need assistance getting out of her vehicle.

## 2025-03-06 ENCOUNTER — TELEPHONE (OUTPATIENT)
Dept: PRIMARY CARE CLINIC | Facility: CLINIC | Age: 65
End: 2025-03-06
Payer: MEDICARE

## 2025-03-11 ENCOUNTER — OFFICE VISIT (OUTPATIENT)
Dept: PRIMARY CARE CLINIC | Facility: CLINIC | Age: 65
End: 2025-03-11
Payer: MEDICARE

## 2025-03-11 VITALS
HEIGHT: 61 IN | DIASTOLIC BLOOD PRESSURE: 60 MMHG | WEIGHT: 206.56 LBS | SYSTOLIC BLOOD PRESSURE: 116 MMHG | HEART RATE: 76 BPM | BODY MASS INDEX: 39 KG/M2

## 2025-03-11 DIAGNOSIS — L98.9 SKIN LESIONS: ICD-10-CM

## 2025-03-11 DIAGNOSIS — E87.6 HYPOKALEMIA: Primary | ICD-10-CM

## 2025-03-11 DIAGNOSIS — F41.0 PANIC DISORDER: ICD-10-CM

## 2025-03-11 LAB
ALBUMIN SERPL BCP-MCNC: 2.9 G/DL (ref 3.5–5.2)
ALP SERPL-CCNC: 117 U/L (ref 40–150)
ALT SERPL W/O P-5'-P-CCNC: 15 U/L (ref 10–44)
ANION GAP SERPL CALC-SCNC: 9 MMOL/L (ref 8–16)
AST SERPL-CCNC: 23 U/L (ref 10–40)
BILIRUB SERPL-MCNC: 0.3 MG/DL (ref 0.1–1)
BUN SERPL-MCNC: 11 MG/DL (ref 8–23)
CALCIUM SERPL-MCNC: 8.6 MG/DL (ref 8.7–10.5)
CHLORIDE SERPL-SCNC: 103 MMOL/L (ref 95–110)
CO2 SERPL-SCNC: 30 MMOL/L (ref 23–29)
CREAT SERPL-MCNC: 1.2 MG/DL (ref 0.5–1.4)
EST. GFR  (NO RACE VARIABLE): 50.2 ML/MIN/1.73 M^2
GLUCOSE SERPL-MCNC: 217 MG/DL (ref 70–110)
POTASSIUM SERPL-SCNC: 3.1 MMOL/L (ref 3.5–5.1)
PROT SERPL-MCNC: 6.8 G/DL (ref 6–8.4)
SODIUM SERPL-SCNC: 142 MMOL/L (ref 136–145)

## 2025-03-11 PROCEDURE — 3288F FALL RISK ASSESSMENT DOCD: CPT | Mod: CPTII,S$GLB,, | Performed by: PHYSICIAN ASSISTANT

## 2025-03-11 PROCEDURE — 3008F BODY MASS INDEX DOCD: CPT | Mod: CPTII,S$GLB,, | Performed by: PHYSICIAN ASSISTANT

## 2025-03-11 PROCEDURE — G2211 COMPLEX E/M VISIT ADD ON: HCPCS | Mod: S$GLB,,, | Performed by: PHYSICIAN ASSISTANT

## 2025-03-11 PROCEDURE — 80053 COMPREHEN METABOLIC PANEL: CPT | Mod: HCNC | Performed by: PHYSICIAN ASSISTANT

## 2025-03-11 PROCEDURE — 1100F PTFALLS ASSESS-DOCD GE2>/YR: CPT | Mod: CPTII,S$GLB,, | Performed by: PHYSICIAN ASSISTANT

## 2025-03-11 PROCEDURE — 1159F MED LIST DOCD IN RCRD: CPT | Mod: CPTII,S$GLB,, | Performed by: PHYSICIAN ASSISTANT

## 2025-03-11 PROCEDURE — 3074F SYST BP LT 130 MM HG: CPT | Mod: CPTII,S$GLB,, | Performed by: PHYSICIAN ASSISTANT

## 2025-03-11 PROCEDURE — 3078F DIAST BP <80 MM HG: CPT | Mod: CPTII,S$GLB,, | Performed by: PHYSICIAN ASSISTANT

## 2025-03-11 PROCEDURE — 1126F AMNT PAIN NOTED NONE PRSNT: CPT | Mod: CPTII,S$GLB,, | Performed by: PHYSICIAN ASSISTANT

## 2025-03-11 PROCEDURE — 3051F HG A1C>EQUAL 7.0%<8.0%: CPT | Mod: CPTII,S$GLB,, | Performed by: PHYSICIAN ASSISTANT

## 2025-03-11 PROCEDURE — 99214 OFFICE O/P EST MOD 30 MIN: CPT | Mod: S$GLB,,, | Performed by: PHYSICIAN ASSISTANT

## 2025-03-11 PROCEDURE — 36415 COLL VENOUS BLD VENIPUNCTURE: CPT | Mod: S$GLB,,, | Performed by: PHYSICIAN ASSISTANT

## 2025-03-11 RX ORDER — FLUCONAZOLE 150 MG/1
150 TABLET ORAL
Qty: 3 TABLET | Refills: 0 | Status: SHIPPED | OUTPATIENT
Start: 2025-03-11 | End: 2025-04-01

## 2025-03-11 RX ORDER — CLINDAMYCIN HYDROCHLORIDE 300 MG/1
300 CAPSULE ORAL EVERY 8 HOURS
Qty: 30 CAPSULE | Refills: 0 | Status: SHIPPED | OUTPATIENT
Start: 2025-03-11

## 2025-03-11 NOTE — PROGRESS NOTES
"Subjective     Patient ID: Amanda Barton is a 64 y.o. female.    Chief Complaint: Follow-up (Hospital follow-up for hypokalemia)    Patient is a 63 yo female who is seeing me for a hospital follow up.     Hospital Course: Amanda Barton is a/an 64 y.o. female with pmhx as listed below but significant for hypertension coronary artery disease type 2 diabetes obstructive sleep apnea obesity.  The patient was seen by her primary care provider PA past month for evaluation of hypokalemia. Her initial potassium was 2.3, EKG with no st segment changes. She was found to be hypokalemic and hypomagnesemic. She was then started on PO K repletement as outpatient by her PCP. She has been taking 40 mEq total (20 mEq tablet and 20 mEq powder) daily but still presented with K of 2.3 and Mg of 1.4. She has been aggressively repleted but K has remained persistently low at 3.0 prompting nephrology eval. US bilateral carotid arteries ordered to investigate atherosclerotic calcification of the bilateral internal carotid arteries and syncope with dizziness.      Consults:  IP CONSULT TO WOUND CARE - WOUND  IP CONSULT TO NEPHROLOGY     Procedures:      Discharge Exam:  Blood pressure 114/67, pulse 75, temperature 98.3 °F (36.8 °C), temperature source Oral, resp. rate 16, height 1.575 m (5' 2"), weight 98.1 kg (216 lb 4.3 oz), SpO2 94%.     Physical Exam  Constitutional:       Appearance: Normal appearance. She is not ill-appearing.   Cardiovascular:      Rate and Rhythm: Normal rate and regular rhythm.      Pulses: Normal pulses.      Heart sounds: Normal heart sounds.   Pulmonary:      Effort: Pulmonary effort is normal.      Breath sounds: Normal breath sounds.   Abdominal:      General: Bowel sounds are normal. There is no distension.      Palpations: Abdomen is soft.      Tenderness: There is no abdominal tenderness. There is no guarding.   Skin:     Capillary Refill: Capillary refill takes less than 2 seconds.      Comments: Rash " under chin, skin breakdown, recommend following up with dermatology   Neurological:      General: No focal deficit present.      Mental Status: She is alert and oriented to person, place, and time.        Disposition: Home     Patient Instructions:   Current Discharge Medication List        CONTINUE these medications which have CHANGED    Details  furosemide (LASIX) 20 MG tablet Take 2 tablets by mouth daily Pending clearance from cardiology    Associated Diagnoses: Hypokalemia     potassium chloride SA (K-DUR,KLOR-CON) 20 MEQ tablet Take 40 meQ in the morning and 20 meQ at night  Qty: 180 tablet, Refills: 0    Associated Diagnoses: Hypokalemia           CONTINUE these medications which have NOT CHANGED    Details  ALPRAZolam (XANAX) 1 MG tablet Take 1 tablet by mouth nightly as needed for Sleep     aspirin 81 MG EC tablet Take 1 tablet by mouth daily     buPROPion (WELLBUTRIN XL) 150 MG 24 hr tablet Take 1 tablet by mouth every morning     DULoxetine (CYMBALTA) 60 MG capsule Take 1 capsule by mouth daily     insulin glargine (LANTUS) 100 unit/mL injection Inject 22 Units into the skin nightly     magnesium oxide 400 mg magnesium Tab tablet Take 1 tablet by mouth 2 (two) times daily     metFORMIN (GLUCOPHAGE) 1000 MG tablet Take 1 tablet by mouth 2 (two) times daily with meals     omeprazole (PRILOSEC) 40 MG capsule Take 1 capsule by mouth daily     oxybutynin (DITROPAN-XL) 10 MG 24 hr tablet Take 1 tablet by mouth daily     oxyCODONE (OXY-IR) 5 mg capsule Take 1 capsule by mouth every 4 (four) hours as needed Max Daily Amount: 30 mg     pregabalin (LYRICA) 150 MG capsule Take 1 capsule by mouth 2 (two) times daily     rOPINIRole (REQUIP) 2 MG tablet Take 1 tablet by mouth nightly     spironolactone (ALDACTONE) 50 MG tablet Take 1 tablet by mouth daily     temazepam (RESTORIL) 22.5 MG capsule Take 1 capsule by mouth nightly as needed for Sleep     atorvastatin (LIPITOR) 40 MG tablet Take 1 tablet by mouth daily      desvenlafaxine succinate (PRISTIQ) 50 MG 24 hr tablet Take 1 tablet by mouth daily     ferrous sulfate 325 (65 FE) MG tablet Take 1 tablet by mouth daily with breakfast           STOP taking these medications       insulin regular (HUMULIN R,NOVOLIN R) 100 unit/mL injection         azilsartan med-chlorthalidone (EDARBYCLOR) 40-12.5 mg Tab         carvediloL (COREG) 6.25 MG tablet         insulin glulisine (APIDRA SOLOSTAR) 100 unit/mL injection pen         NAPROXEN (NAPROSYN ORAL)            PATIENT IS HER FOR FOLLOW UP. CONTINUES WITH FATIGUE. REPORTS THAT Mena DID NOT FIND ANY OBVIOUS CAUSE OF THE HYPOKALEMIA.     Follow-up  Associated symptoms include fatigue. Pertinent negatives include no abdominal pain, chest pain, chills, fever or headaches.     Review of Systems   Constitutional:  Positive for fatigue. Negative for chills and fever.   Respiratory:  Negative for chest tightness and shortness of breath.    Cardiovascular:  Negative for chest pain.   Gastrointestinal:  Negative for abdominal pain.   Neurological:  Negative for dizziness, seizures and headaches.          Objective     Physical Exam  Vitals reviewed.   Constitutional:       General: She is not in acute distress.     Appearance: Normal appearance. She is not ill-appearing, toxic-appearing or diaphoretic.   HENT:      Head: Normocephalic and atraumatic.        Comments: Has pea sized lesions over her lower 1/3 face. Appear to have an erythematous ulcerative base. ( Patient states they have purulent discharge. )  Cardiovascular:      Rate and Rhythm: Normal rate and regular rhythm.      Pulses: Normal pulses.      Heart sounds: Normal heart sounds. No murmur heard.     No friction rub. No gallop.   Pulmonary:      Effort: Pulmonary effort is normal. No respiratory distress.      Breath sounds: Normal breath sounds. No stridor. No wheezing, rhonchi or rales.   Chest:      Chest wall: No tenderness.   Abdominal:      Palpations: Abdomen is  soft.      Tenderness: There is no abdominal tenderness.   Neurological:      Mental Status: She is alert.            Assessment and Plan     1. Hypokalemia  -     Ambulatory referral/consult to Nephrology; Future; Expected date: 03/18/2025  -     Comprehensive Metabolic Panel; Future; Expected date: 03/11/2025    2. Panic disorder    3. Skin lesions  -     Ambulatory referral/consult to Dermatology; Future; Expected date: 03/18/2025    Other orders  -     clindamycin (CLEOCIN) 300 MG capsule; Take 1 capsule (300 mg total) by mouth every 8 (eight) hours.  Dispense: 30 capsule; Refill: 0  -     fluconazole (DIFLUCAN) 150 MG Tab; Take 1 tablet (150 mg total) by mouth every 7 days. for 21 days  Dispense: 3 tablet; Refill: 0        I spent 30 minutes on this encounter, time includes face-to-face, chart review, documentation, test review and orders.           Follow up in about 3 months (around 6/11/2025).

## 2025-03-12 DIAGNOSIS — G47.00 INSOMNIA, UNSPECIFIED TYPE: ICD-10-CM

## 2025-03-13 ENCOUNTER — TELEPHONE (OUTPATIENT)
Dept: PRIMARY CARE CLINIC | Facility: CLINIC | Age: 65
End: 2025-03-13

## 2025-03-13 ENCOUNTER — RESULTS FOLLOW-UP (OUTPATIENT)
Dept: PRIMARY CARE CLINIC | Facility: CLINIC | Age: 65
End: 2025-03-13

## 2025-03-13 ENCOUNTER — OFFICE VISIT (OUTPATIENT)
Dept: ENDOCRINOLOGY | Facility: CLINIC | Age: 65
End: 2025-03-13
Payer: MEDICARE

## 2025-03-13 VITALS
WEIGHT: 208.13 LBS | HEIGHT: 61 IN | SYSTOLIC BLOOD PRESSURE: 122 MMHG | HEART RATE: 86 BPM | BODY MASS INDEX: 39.3 KG/M2 | RESPIRATION RATE: 18 BRPM | DIASTOLIC BLOOD PRESSURE: 66 MMHG | OXYGEN SATURATION: 98 %

## 2025-03-13 DIAGNOSIS — E87.6 HYPOKALEMIA: ICD-10-CM

## 2025-03-13 DIAGNOSIS — E78.2 DM TYPE 2 WITH DIABETIC MIXED HYPERLIPIDEMIA: ICD-10-CM

## 2025-03-13 DIAGNOSIS — Z79.4 TYPE 2 DIABETES MELLITUS WITH MICROALBUMINURIA, WITH LONG-TERM CURRENT USE OF INSULIN: Primary | ICD-10-CM

## 2025-03-13 DIAGNOSIS — E11.69 DM TYPE 2 WITH DIABETIC MIXED HYPERLIPIDEMIA: ICD-10-CM

## 2025-03-13 DIAGNOSIS — E11.42 TYPE 2 DIABETES MELLITUS WITH DIABETIC POLYNEUROPATHY, WITH LONG-TERM CURRENT USE OF INSULIN: ICD-10-CM

## 2025-03-13 DIAGNOSIS — Z79.4 TYPE 2 DIABETES MELLITUS WITH DIABETIC POLYNEUROPATHY, WITH LONG-TERM CURRENT USE OF INSULIN: ICD-10-CM

## 2025-03-13 DIAGNOSIS — R80.9 TYPE 2 DIABETES MELLITUS WITH MICROALBUMINURIA, WITH LONG-TERM CURRENT USE OF INSULIN: Primary | ICD-10-CM

## 2025-03-13 DIAGNOSIS — E11.29 TYPE 2 DIABETES MELLITUS WITH MICROALBUMINURIA, WITH LONG-TERM CURRENT USE OF INSULIN: Primary | ICD-10-CM

## 2025-03-13 DIAGNOSIS — I10 HYPERTENSION, UNSPECIFIED TYPE: ICD-10-CM

## 2025-03-13 PROBLEM — E66.812 CLASS 2 SEVERE OBESITY WITH SERIOUS COMORBIDITY AND BODY MASS INDEX (BMI) OF 38.0 TO 38.9 IN ADULT, UNSPECIFIED OBESITY TYPE: Status: RESOLVED | Noted: 2019-02-24 | Resolved: 2025-03-13

## 2025-03-13 PROBLEM — E66.01 CLASS 2 SEVERE OBESITY WITH SERIOUS COMORBIDITY AND BODY MASS INDEX (BMI) OF 38.0 TO 38.9 IN ADULT, UNSPECIFIED OBESITY TYPE: Status: RESOLVED | Noted: 2019-02-24 | Resolved: 2025-03-13

## 2025-03-13 PROCEDURE — 99999 PR PBB SHADOW E&M-EST. PATIENT-LVL V: CPT | Mod: PBBFAC,HCNC,, | Performed by: NURSE PRACTITIONER

## 2025-03-13 RX ORDER — TIRZEPATIDE 12.5 MG/.5ML
12.5 INJECTION, SOLUTION SUBCUTANEOUS
Qty: 4 PEN | Refills: 6 | Status: SHIPPED | OUTPATIENT
Start: 2025-03-13

## 2025-03-13 RX ORDER — TEMAZEPAM 30 MG/1
CAPSULE ORAL
Qty: 30 CAPSULE | Refills: 0 | Status: SHIPPED | OUTPATIENT
Start: 2025-03-13

## 2025-03-13 RX ORDER — INSULIN GLARGINE 100 [IU]/ML
INJECTION, SOLUTION SUBCUTANEOUS
Qty: 15 ML | Refills: 3 | Status: SHIPPED | OUTPATIENT
Start: 2025-03-13

## 2025-03-13 RX ORDER — POTASSIUM CHLORIDE 1.5 G/1.58G
20 POWDER, FOR SOLUTION ORAL 3 TIMES DAILY
Qty: 270 PACKET | Refills: 1 | Status: SHIPPED | OUTPATIENT
Start: 2025-03-13 | End: 2025-05-13

## 2025-03-13 NOTE — TELEPHONE ENCOUNTER
Tried to call pt about lab results. No answer or voicemail available. Will try to contact pt later.

## 2025-03-13 NOTE — TELEPHONE ENCOUNTER
----- Message from Fer Herring PA-C sent at 3/13/2025  7:06 AM CDT -----  Amanda Barton the lab work shows improvement in the potassium, but is still low. I recommend increasing your potassium supplement to 3 times daily  ----- Message -----  From: [facility] [employer] [facility] Interface  Sent: 3/11/2025   6:29 PM CDT  To: Fer Herring III, PA-C

## 2025-03-13 NOTE — TELEPHONE ENCOUNTER
----- Message from Mary Jo sent at 3/13/2025  8:34 AM CDT -----  Type:  Patient Returning CallWho Called:  ptWho Left Message for Patient:  unknownDoes the patient know what this is regarding?:  labsWould the patient rather a call back or a response via [facility]? callWould the Best Call Back Number:  [phone] Information:  Thank you

## 2025-03-13 NOTE — PROGRESS NOTES
"CC: This 64 y.o. female presents for management of diabetes  and chronic conditions pending review including , HLP, afib, BJ, CHF, morbid obesity    HPI: She was diagnosed with T2DM in the 1990s. Has  been hospitalized r/t DM for bg in the 500s.   Family hx of DM: daughter  Her granddaughter passed away on May 5th 2023    In the hospital last week w hypokalemia  Given farxiga- took a total of 3 doses - stopped after blisters formed on her face-  has been given antibiotics, still has not seen improvement  Awaiting derm appt   Also reports her lantus was stopped in the ER    See dexcom download in Media tab  25% Very high  43% High  32% In range  0% Low  <1% Very low  Having  pp excursions, some elevations noted overnight    Diet: Eats 2 Meals a day, snacks- Chex Mix, Hot [place]   Exercise: none  CURRENT DM MEDS:   metformin 1000 mg bid, Mounjaro 10 weekly, (Sunday) , Novolog 6 u AC + SSI if bg > 250  Previous meds- farxiga- rash  Reports she is taking 5-15 mins prior to a meal  no  Vial/pen:  Uses pens  Glucometer type:  [employer] contour    Standards of Care:  Eye exam: 9/2024 -      Cardiologist follows w Dr Williamson     ROS:   Gen: Appetite good,   Resp: no SOB or LEMOS   Cardiac: No palpitations, chest pain, +BLE edema  GI: No nausea or vomiting, diarrhea, constipation   /GYN: no nocturia, no burning or pain.   MS/Neuro: no numbness/ tingling in BLE;  speech clear  Psych: Denies drug/ETOH abuse, + depression.  Other systems: negative.    PE:  GENERAL: Well developed, well nourished.  PSYCH: AAOx3, appropriate mood and affect, pleasant expression, conversant, appears relaxed, well groomed.   EYES: Conjunctiva, corneas clear  NECK: Supple, trachea midline,       Personally reviewed Past Medical, Surgical, Social History.    /66 (BP Location: Right arm, Patient Position: Sitting)   Pulse 86   Resp 18   Ht 5' 1" (1.549 m)   Wt 94.4 kg (208 lb 1.8 oz)   SpO2 98%   BMI 39.32 kg/m²      Personally reviewed the " below labs:      Chemistry        Component Value Date/Time     03/11/2025 1357    K 3.1 (L) 03/11/2025 1357     03/11/2025 1357    CO2 30 (H) 03/11/2025 1357    BUN 11 03/11/2025 1357    CREATININE 1.2 03/11/2025 1357     (H) 03/11/2025 1357        Component Value Date/Time    CALCIUM 8.6 (L) 03/11/2025 1357    ALKPHOS 117 03/11/2025 1357    AST 23 03/11/2025 1357    ALT 15 03/11/2025 1357    BILITOT 0.3 03/11/2025 1357    ESTGFRAFRICA >60 11/21/2019 0526    EGFRNONAA >60 11/21/2019 0526            Lab Results   Component Value Date    TSH 0.645 02/07/2025       Recent Labs   Lab 10/29/24  1411   LDL Cholesterol 42.6 L   HDL 25 L   Cholesterol 108 L        No results found for this or any previous visit.  Results for orders placed or performed in visit on 04/16/24   Calcitriol    Collection Time: 04/16/24  1:56 PM   Result Value Ref Range    Vit D, 1,25-Dihydroxy 55 20 - 79 pg/mL       Lab Results   Component Value Date    MICALBCREAT Unable to calculate 10/29/2024       Hemoglobin A1C   Date Value Ref Range Status   02/21/2025 7.8 (H) 4.0 - 5.6 % Final     Comment:     ADA Screening Guidelines:  5.7-6.4%  Consistent with prediabetes  >or=6.5%  Consistent with diabetes    High levels of fetal hemoglobin interfere with the HbA1C  assay. Heterozygous hemoglobin variants (HbS, HgC, etc)do  not significantly interfere with this assay.   However, presence of multiple variants may affect accuracy.     10/29/2024 8.2 (H) 4.0 - 5.6 % Final     Comment:     ADA Screening Guidelines:  5.7-6.4%  Consistent with prediabetes  >or=6.5%  Consistent with diabetes    High levels of fetal hemoglobin interfere with the HbA1C  assay. Heterozygous hemoglobin variants (HbS, HgC, etc)do  not significantly interfere with this assay.   However, presence of multiple variants may affect accuracy.     08/30/2024 6.9 (H) 4.0 - 5.6 % Final     Comment:     ADA Screening Guidelines:  5.7-6.4%  Consistent with  prediabetes  >or=6.5%  Consistent with diabetes    High levels of fetal hemoglobin interfere with the HbA1C  assay. Heterozygous hemoglobin variants (HbS, HgC, etc)do  not significantly interfere with this assay.   However, presence of multiple variants may affect accuracy.          ASSESSMENT and PLAN:      1. T2DM with hyperglycemia, DM PN, microalbuminuria   Start Lantus 10 u qhs- to take at the same time every night,    Novolog 6 u AC +correction 180/25- before meals only   Continue metformin 1000 mg bid; Increase Mounjaro to 12.5  mg weekly   Continue Dexcom-  notify me for issues     2. HLP - on statin therapy,    3. H/o afib, CHF- follows w Dr Williamson, avoid TZD      4. Depression- being treated by PCP, grieving death of her granddaughter     5. Morbid obesity- activity limited r/t limited mobility  Body mass index is 39.32 kg/m².      Follow-up: in 3 months with A1C

## 2025-03-13 NOTE — TELEPHONE ENCOUNTER
----- Message from Fer Herring PA-C sent at 3/13/2025  7:06 AM CDT -----  Amanda Barton the lab work shows improvement in the potassium, but is still low. I recommend increasing your potassium supplement to 3 times daily  ----- Message -----  From: [facility] Interface  Sent: 3/11/2025   6:29 PM CDT  To: Fer Herring III, PA-C

## 2025-04-01 ENCOUNTER — TELEPHONE (OUTPATIENT)
Dept: PRIMARY CARE CLINIC | Facility: CLINIC | Age: 65
End: 2025-04-01
Payer: MEDICARE

## 2025-04-01 NOTE — TELEPHONE ENCOUNTER
----- Message from Aidee sent at 4/1/2025  8:53 AM CDT -----  Contact: Pt [phone]  Type:  RX Refill RequestWho Called:  Pt Refill or New Rx:  refillRX Name and Strength:  ALPRAZolam (XANAX) 1 MG tablet How is the patient currently taking it? (ex. 1XDay):  2xday Is this a 30 day or 90 day RX:  30[facility] [address] [address]: [phone] Fax: [phone] or Mail Order:  Local Ordering Provider:  Nevaeh Bond Call Back Number:  [phone] Information:  Pls call back/ Pt stated she is out of meds

## 2025-04-01 NOTE — TELEPHONE ENCOUNTER
Spoke to pt to let her know that dr. Herring is no longer in the [facility] office. Pt needs refills on a controlled medication and the PCP that is in office doesn't write controlled medications. Pt stated that she will call [place] to get appointment with Dr. Herring.

## 2025-04-04 ENCOUNTER — TELEPHONE (OUTPATIENT)
Dept: NEPHROLOGY | Facility: CLINIC | Age: 65
End: 2025-04-04
Payer: MEDICARE

## 2025-04-04 NOTE — TELEPHONE ENCOUNTER
Spoke with patient and she is aware that nurse will be making her new patient appointment with the Nephrology team. She is currently not a dialysis patient and prefer morning appointment around 10 and any day works for her.

## 2025-05-07 ENCOUNTER — TELEPHONE (OUTPATIENT)
Dept: NEPHROLOGY | Facility: CLINIC | Age: 65
End: 2025-05-07
Payer: MEDICARE

## 2025-05-13 ENCOUNTER — OFFICE VISIT (OUTPATIENT)
Dept: NEPHROLOGY | Facility: CLINIC | Age: 65
End: 2025-05-13
Payer: MEDICARE

## 2025-05-13 VITALS
DIASTOLIC BLOOD PRESSURE: 70 MMHG | OXYGEN SATURATION: 99 % | WEIGHT: 208.13 LBS | HEART RATE: 85 BPM | BODY MASS INDEX: 39.3 KG/M2 | SYSTOLIC BLOOD PRESSURE: 140 MMHG | HEIGHT: 61 IN

## 2025-05-13 DIAGNOSIS — N39.0 UTI (URINARY TRACT INFECTION), UNCOMPLICATED: ICD-10-CM

## 2025-05-13 DIAGNOSIS — E87.6 HYPOKALEMIA: Primary | ICD-10-CM

## 2025-05-13 DIAGNOSIS — N18.31 CKD STAGE 3A, GFR 45-59 ML/MIN: ICD-10-CM

## 2025-05-13 DIAGNOSIS — I51.89 DIASTOLIC DYSFUNCTION: ICD-10-CM

## 2025-05-13 DIAGNOSIS — E83.42 HYPOMAGNESEMIA: ICD-10-CM

## 2025-05-13 DIAGNOSIS — I10 HYPERTENSION, UNSPECIFIED TYPE: ICD-10-CM

## 2025-05-13 PROCEDURE — 3066F NEPHROPATHY DOC TX: CPT | Mod: CPTII,S$GLB,, | Performed by: INTERNAL MEDICINE

## 2025-05-13 PROCEDURE — 1160F RVW MEDS BY RX/DR IN RCRD: CPT | Mod: CPTII,S$GLB,, | Performed by: INTERNAL MEDICINE

## 2025-05-13 PROCEDURE — 99204 OFFICE O/P NEW MOD 45 MIN: CPT | Mod: S$GLB,,, | Performed by: INTERNAL MEDICINE

## 2025-05-13 PROCEDURE — 3078F DIAST BP <80 MM HG: CPT | Mod: CPTII,S$GLB,, | Performed by: INTERNAL MEDICINE

## 2025-05-13 PROCEDURE — 3077F SYST BP >= 140 MM HG: CPT | Mod: CPTII,S$GLB,, | Performed by: INTERNAL MEDICINE

## 2025-05-13 PROCEDURE — 99999 PR PBB SHADOW E&M-EST. PATIENT-LVL III: CPT | Mod: PBBFAC,,, | Performed by: INTERNAL MEDICINE

## 2025-05-13 PROCEDURE — 3051F HG A1C>EQUAL 7.0%<8.0%: CPT | Mod: CPTII,S$GLB,, | Performed by: INTERNAL MEDICINE

## 2025-05-13 PROCEDURE — 1159F MED LIST DOCD IN RCRD: CPT | Mod: CPTII,S$GLB,, | Performed by: INTERNAL MEDICINE

## 2025-05-13 PROCEDURE — 3008F BODY MASS INDEX DOCD: CPT | Mod: CPTII,S$GLB,, | Performed by: INTERNAL MEDICINE

## 2025-05-13 RX ORDER — CEFUROXIME AXETIL 250 MG/1
500 TABLET ORAL 2 TIMES DAILY
Qty: 28 TABLET | Refills: 0 | Status: SHIPPED | OUTPATIENT
Start: 2025-05-13 | End: 2025-05-20

## 2025-05-13 RX ORDER — SPIRONOLACTONE 50 MG/1
50 TABLET, FILM COATED ORAL DAILY
Qty: 90 TABLET | Refills: 1 | Status: SHIPPED | OUTPATIENT
Start: 2025-05-13 | End: 2025-11-09

## 2025-05-13 NOTE — PROGRESS NOTES
Subjective:       Patient ID: Amanda Barton is a 65 y.o. White female who presents for new patient evaluation for chronic renal failure.    Amanda Barton is referred by No primary care provider on file. to be evaluated for chronic renal failure.      64 y.o. female with past medical history of hypertension, CAD, DM, JB who was sent by her PCP for worsening hypokalemia. Patient reports she has had hypokalemia since at least 2017 when she was admitted at [facility] for 2 months for sepsis. She reports she has been extensively worked up for this with no specific etiology delineated. She said she went to [facility] in [place] about a month ago due t side effects from sedation during venous procedure with her cardiologist. She was found to by hypokalemic and hypomagnesemic during that presentation. She was then started on PO K repletement as outpatient by her PCP and her diureitcs were reduced to once daily. She has been taking 40 mEq total (20 mEq tablet and 20 mEq powder) daily but still presented with K of 2.3 and Mg of 1.4. She has been aggressively repleted but K has remained persistently low prompting nephrology eval.     Adopted  Granddaughter enoc ackerman (bc ramirez)    Cards: Aduli  Review of Systems   All other systems reviewed and are negative.      The past medical, family and social histories were reviewed for this encounter.     Past Medical History:   Diagnosis Date    Acute bacterial pericarditis 12/02/2017    Anemia     Bacteremia due to methicillin resistant Staphylococcus aureus 11/20/2017    Diabetes mellitus     Encounter for blood transfusion     GERD (gastroesophageal reflux disease)     Heart murmur     History of pneumonia     History of UTI     Hypertension     Migraine headache     Pericarditis 02/17/2019    Personal history of colonic polyps 01/25/2023    Sleep apnea     no machine yet    Type 2 diabetes mellitus without complication, with long-term current use  of insulin 2017     Past Surgical History:   Procedure Laterality Date    APPENDECTOMY      CATHETERIZATION OF BOTH LEFT AND RIGHT HEART N/A 2023    Procedure: Right and Left Heart Cath;  Surgeon: Osman Williamson MD;  Location: [facility] CATH;  Service: Cardiology;  Laterality: N/A;     SECTION      CHOLECYSTECTOMY      COLONOSCOPY N/A 2017    Procedure: COLONOSCOPY;  Surgeon: Juan Lepe MD;  Location: [facility] ENDO;  Service: Endoscopy;  Laterality: N/A;    COLONOSCOPY  2023    3 yr recall    ESOPHAGOGASTRODUODENOSCOPY N/A 2019    Procedure: EGD (ESOPHAGOGASTRODUODENOSCOPY);  Surgeon: Gustavo Austin MD;  Location: [facility] ENDO;  Service: Endoscopy;  Laterality: N/A;  with Push Enteroscopy    ESOPHAGOGASTRODUODENOSCOPY N/A 2019    Procedure: EGD (ESOPHAGOGASTRODUODENOSCOPY);  Surgeon: Gustavo Austin MD;  Location: [facility] ENDO;  Service: Endoscopy;  Laterality: N/A;  Push enteroscopy    ESOPHAGOGASTRODUODENOSCOPY  2023    HYSTERECTOMY      IRRIGATION AND DEBRIDEMENT OF LUMBAR SPINE  2022    LUMBAR FUSION  2021    L4-L5     Social History[1]  Current Outpatient Medications   Medication Sig    albuterol (VENTOLIN HFA) 90 mcg/actuation inhaler Inhale 2 puffs into the lungs every 6 (six) hours as needed for Wheezing (Rescue Inhaler). Rescue    blood-glucose meter kit Use as instructed    budesonide-formoterol 160-4.5 mcg (SYMBICORT) 160-4.5 mcg/actuation HFAA Inhale 2 puffs into the lungs every 12 (twelve) hours. Controller    buPROPion (WELLBUTRIN XL) 150 MG TB24 tablet Take 1 tablet (150 mg total) by mouth once daily.    calcium carbonate (CALCIUM 300 ORAL) Take by mouth.    clindamycin (CLEOCIN) 300 MG capsule Take 1 capsule (300 mg total) by mouth every 8 (eight) hours.    DULoxetine (CYMBALTA) 60 MG capsule Take 1 capsule (60 mg total) by mouth 2 (two) times daily.    furosemide (LASIX) 20 MG tablet Take 3 times a week    insulin glargine U-100, Lantus,  "(LANTUS SOLOSTAR U-100 INSULIN) 100 unit/mL (3 mL) InPn pen Inject 10 units into the skin at bedtime.    lancets (LANCETS,ULTRA THIN) Misc 1 Lancet by Misc.(Non-Drug; Combo Route) route 2 (two) times a day.    lancing device Misc 1 Device by Misc.(Non-Drug; Combo Route) route 2 (two) times a day.    linaCLOtide (LINZESS) 145 mcg Cap capsule Take 1 capsule (145 mcg total) by mouth before breakfast.    metFORMIN (GLUCOPHAGE) 1000 MG tablet Take 1 tablet (1,000 mg total) by mouth 2 (two) times daily with meals.    mupirocin (BACTROBAN) 2 % ointment APPLY TOPICALLY 3 (THREE) TIMES DAILY.    NOVOLOG FLEXPEN U-100 INSULIN 100 unit/mL (3 mL) InPn pen 6 u Ac + correction  Max TDD 54u    ondansetron (ZOFRAN-ODT) 4 MG TbDL Take 1 tablet (4 mg total) by mouth every 8 (eight) hours as needed.    oxybutynin (DITROPAN-XL) 10 MG 24 hr tablet TAKE 1 TABLET (10 MG TOTAL) BY MOUTH ONCE DAILY.    oxyCODONE-acetaminophen (PERCOCET) 5-325 mg per tablet Take 1 tablet by mouth every 4 to 6 hours as needed for Pain.    pen needle, diabetic (BD KARYN 2ND GEN PEN NEEDLE) 32 gauge x 5/32" Ndle Checks bg 4 times a day    rOPINIRole (REQUIP) 2 MG tablet Take 1 tablet (2 mg total) by mouth 3 (three) times daily.    temazepam (RESTORIL) 30 mg capsule take 1 capsule (30mg total) by mouth nightly as needed for insomnia.    tirzepatide (MOUNJARO) 12.5 mg/0.5 mL PnIj Inject 12.5 mg into the skin every 7 days.    TRUE METRIX GLUCOSE TEST STRIP Str USE TO TEST BLOOD SUGAR TWICE DAILY    ALPRAZolam (XANAX) 1 MG tablet Take 1 tablet (1 mg total) by mouth 2 (two) times daily.    cefUROXime (CEFTIN) 250 MG tablet Take 2 tablets (500 mg total) by mouth 2 (two) times daily. for 7 days    desloratadine (CLARINEX) 5 mg tablet Take 1 tablet (5 mg total) by mouth once daily.    pregabalin (LYRICA) 150 MG capsule Take 1 capsule (150 mg total) by mouth 3 (three) times daily.    rosuvastatin (CRESTOR) 20 MG tablet Take 1 tablet (20 mg total) by mouth every evening. " "(Patient not taking: Reported on 5/13/2025)    spironolactone (ALDACTONE) 50 MG tablet Take 1 tablet (50 mg total) by mouth once daily.    tiZANidine (ZANAFLEX) 4 MG tablet Take 1 tablet (4 mg total) by mouth every 8 (eight) hours. (Patient not taking: Reported on 5/13/2025)     No current facility-administered medications for this visit.     BP (!) 140/70 (BP Location: Right arm, Patient Position: Sitting)   Pulse 85   Ht 5' 1" (1.549 m)   Wt 94.4 kg (208 lb 1.8 oz)   SpO2 99%   BMI 39.32 kg/m²     Objective:      Physical Exam  Vitals reviewed.   Constitutional:       General: She is not in acute distress.     Appearance: She is well-developed.   HENT:      Head: Normocephalic and atraumatic.   Eyes:      General: No scleral icterus.     Conjunctiva/sclera: Conjunctivae normal.   Neck:      Vascular: No JVD.   Cardiovascular:      Rate and Rhythm: Normal rate and regular rhythm.      Heart sounds: Normal heart sounds. No murmur heard.     No friction rub. No gallop.   Pulmonary:      Effort: Pulmonary effort is normal. No respiratory distress.      Breath sounds: Normal breath sounds. No wheezing or rales.   Abdominal:      General: Bowel sounds are normal. There is no distension.      Palpations: Abdomen is soft.      Tenderness: There is no abdominal tenderness.   Musculoskeletal:      Right lower leg: No edema.      Left lower leg: No edema.   Skin:     General: Skin is warm and dry.      Findings: No rash.   Neurological:      Mental Status: She is alert and oriented to person, place, and time.         Assessment:       1. Hypokalemia    2. Diastolic dysfunction    3. Hypertension, unspecified type    4. Hypomagnesemia    5. CKD stage 3a, GFR 45-59 ml/min    6. UTI (urinary tract infection), uncomplicated        Lab Results   Component Value Date    CREATININE 1.2 03/11/2025    BUN 11 03/11/2025     03/11/2025    K 3.1 (L) 03/11/2025     03/11/2025    CO2 30 (H) 03/11/2025     Lab Results " "  Component Value Date    CALCIUM 8.6 (L) 03/11/2025    PHOS 3.7 08/24/2018     Lab Results   Component Value Date    HCT 37.7 02/21/2025     No results found for: "UTPCR"    Plan:   Return to clinic in 3 weeks.  Labs for next visit include rfp, pth, upc, ua,mag.  Baseline creatinine is 0.8-1.0. Assess for proteinuria at next visit.  Hypokalemia, continue spironolactone 50mg daily, check mag.  Prescribed Ceftin for uti symptoms, obtain urine culture  BP near goal, will monitor       [1]   Social History  Socioeconomic History    Marital status:    Tobacco Use    Smoking status: Never     Passive exposure: Never    Smokeless tobacco: Never   Substance and Sexual Activity    Alcohol use: No     Social Drivers of Health     Financial Resource Strain: Medium Risk (12/28/2023)    Overall Financial Resource Strain (CARDIA)     Difficulty of Paying Living Expenses: Somewhat hard   Food Insecurity: No Food Insecurity (2/28/2025)    Received from [facility]    Hunger Vital Sign     Worried About Running Out of Food in the Last Year: Never true     Ran Out of Food in the Last Year: Never true   Transportation Needs: No Transportation Needs (2/28/2025)    Received from [facility]    PRAPARE - Transportation     Lack of Transportation (Medical): No     Lack of Transportation (Non-Medical): No   Physical Activity: Insufficiently Active (12/28/2023)    Exercise Vital Sign     Days of Exercise per Week: 3 days     Minutes of Exercise per Session: 20 min   Stress: Stress Concern Present (12/28/2023)    [employer] - Occupational Stress Questionnaire     Feeling of Stress : Very much   Housing Stability: Low Risk  (2/28/2025)    Received from [facility]    Housing Stability Vital Sign     Unable to Pay for Housing in the Last Year: No     Number of Times Moved in the Last Year: 0     Homeless in the Last Year: No     "

## 2025-05-15 ENCOUNTER — LAB VISIT (OUTPATIENT)
Dept: LAB | Facility: HOSPITAL | Age: 65
End: 2025-05-15
Attending: INTERNAL MEDICINE
Payer: MEDICARE

## 2025-05-15 DIAGNOSIS — E87.6 HYPOKALEMIA: ICD-10-CM

## 2025-05-15 LAB
ALBUMIN SERPL BCP-MCNC: 3.3 G/DL (ref 3.5–5.2)
ANION GAP (OHS): 12 MMOL/L (ref 8–16)
BACTERIA #/AREA URNS HPF: ABNORMAL /HPF
BILIRUB UR QL STRIP.AUTO: NEGATIVE
BUN SERPL-MCNC: 14 MG/DL (ref 8–23)
CALCIUM SERPL-MCNC: 9.1 MG/DL (ref 8.7–10.5)
CHLORIDE SERPL-SCNC: 95 MMOL/L (ref 95–110)
CLARITY UR: ABNORMAL
CO2 SERPL-SCNC: 31 MMOL/L (ref 23–29)
COLOR UR AUTO: YELLOW
CREAT SERPL-MCNC: 1 MG/DL (ref 0.5–1.4)
GFR SERPLBLD CREATININE-BSD FMLA CKD-EPI: >60 ML/MIN/1.73/M2
GLUCOSE SERPL-MCNC: 338 MG/DL (ref 70–110)
GLUCOSE UR QL STRIP: ABNORMAL
HGB UR QL STRIP: NEGATIVE
KETONES UR QL STRIP: NEGATIVE
LEUKOCYTE ESTERASE UR QL STRIP: ABNORMAL
MAGNESIUM SERPL-MCNC: 1.5 MG/DL (ref 1.6–2.6)
MICROSCOPIC COMMENT: ABNORMAL
NITRITE UR QL STRIP: POSITIVE
PH UR STRIP: 6 [PH]
PHOSPHATE SERPL-MCNC: 2.8 MG/DL (ref 2.7–4.5)
POTASSIUM SERPL-SCNC: 2.9 MMOL/L (ref 3.5–5.1)
PROT UR QL STRIP: NEGATIVE
PTH-INTACT SERPL-MCNC: 143.7 PG/ML (ref 9–77)
SODIUM SERPL-SCNC: 138 MMOL/L (ref 136–145)
SP GR UR STRIP: 1.01
SQUAMOUS #/AREA URNS HPF: 1 /HPF
WBC #/AREA URNS HPF: 12 /HPF (ref 0–5)
YEAST URNS QL MICRO: ABNORMAL /HPF

## 2025-05-15 PROCEDURE — 81001 URINALYSIS AUTO W/SCOPE: CPT | Mod: PO | Performed by: INTERNAL MEDICINE

## 2025-05-15 PROCEDURE — 82435 ASSAY OF BLOOD CHLORIDE: CPT

## 2025-05-15 PROCEDURE — 36415 COLL VENOUS BLD VENIPUNCTURE: CPT | Mod: PO

## 2025-05-15 PROCEDURE — 87086 URINE CULTURE/COLONY COUNT: CPT | Performed by: INTERNAL MEDICINE

## 2025-05-15 PROCEDURE — 83735 ASSAY OF MAGNESIUM: CPT

## 2025-05-15 PROCEDURE — 83970 ASSAY OF PARATHORMONE: CPT

## 2025-05-17 LAB — BACTERIA UR CULT: ABNORMAL

## 2025-05-29 ENCOUNTER — TELEPHONE (OUTPATIENT)
Dept: ENDOCRINOLOGY | Facility: CLINIC | Age: 65
End: 2025-05-29
Payer: MEDICARE

## 2025-05-29 NOTE — TELEPHONE ENCOUNTER
Copied from CRM #[id number]. Topic: General Inquiry - Patient Advice  >> May 29, 2025  3:00 PM Anastasia wrote:  Type: Needs Medical Advice    Who Called:pt  Best Call Back Number: [phone]    Additional Information: Requesting a call back regarding  Pt is asking where her orders for the Dexcom gets sent to. Pt daughter was handling and not now. Pt is almost out and asking for a call so she can get more sensors ordered.     Please Advise- Thank you

## 2025-05-29 NOTE — TELEPHONE ENCOUNTER
S/w pt notified her we do not know which DME her insurance is contracted with and to call her insurance to find out and then she can call them and ask them about the dexcom supplies.pt v/u

## 2025-06-16 ENCOUNTER — TELEPHONE (OUTPATIENT)
Dept: ENDOCRINOLOGY | Facility: CLINIC | Age: 65
End: 2025-06-16
Payer: MEDICARE

## 2025-06-16 NOTE — TELEPHONE ENCOUNTER
S/w pt. States having an angiogram on Wed. Wants to reschedule appt with Bhavana she had scheduled for Thursday. Rescheduled to virtual visit for tomorrow (6/17) at 7am. Verbalized understanding.

## 2025-06-16 NOTE — TELEPHONE ENCOUNTER
Copied from CRM #[id number]. Topic: Appointments - Appointment Access  >> Jun 16, 2025 11:00 AM Kavya wrote:  Type:  Sooner Apoointment Request    Caller is requesting a sooner appointment.  Caller declined first available appointment listed below.  Caller will not accept being placed on the waitlist and is requesting a message be sent to doctor.  Name of Caller:pt   Would the patient rather a call back or a response via [facility]? Call back   Best Call Back Number:[phone]    Additional Information: pt would like to schedule a virtual appt for follow up. Please call back

## 2025-06-17 ENCOUNTER — OFFICE VISIT (OUTPATIENT)
Dept: ENDOCRINOLOGY | Facility: CLINIC | Age: 65
End: 2025-06-17
Payer: MEDICARE

## 2025-06-17 DIAGNOSIS — R80.9 TYPE 2 DIABETES MELLITUS WITH MICROALBUMINURIA, WITH LONG-TERM CURRENT USE OF INSULIN: ICD-10-CM

## 2025-06-17 DIAGNOSIS — I48.0 PAROXYSMAL ATRIAL FIBRILLATION: ICD-10-CM

## 2025-06-17 DIAGNOSIS — E78.2 DM TYPE 2 WITH DIABETIC MIXED HYPERLIPIDEMIA: ICD-10-CM

## 2025-06-17 DIAGNOSIS — E11.29 TYPE 2 DIABETES MELLITUS WITH MICROALBUMINURIA, WITH LONG-TERM CURRENT USE OF INSULIN: ICD-10-CM

## 2025-06-17 DIAGNOSIS — Z79.4 TYPE 2 DIABETES MELLITUS WITH DIABETIC POLYNEUROPATHY, WITH LONG-TERM CURRENT USE OF INSULIN: Primary | ICD-10-CM

## 2025-06-17 DIAGNOSIS — I10 HYPERTENSION, UNSPECIFIED TYPE: ICD-10-CM

## 2025-06-17 DIAGNOSIS — E11.69 DM TYPE 2 WITH DIABETIC MIXED HYPERLIPIDEMIA: ICD-10-CM

## 2025-06-17 DIAGNOSIS — E11.42 TYPE 2 DIABETES MELLITUS WITH DIABETIC POLYNEUROPATHY, WITH LONG-TERM CURRENT USE OF INSULIN: Primary | ICD-10-CM

## 2025-06-17 DIAGNOSIS — Z79.4 TYPE 2 DIABETES MELLITUS WITH MICROALBUMINURIA, WITH LONG-TERM CURRENT USE OF INSULIN: ICD-10-CM

## 2025-06-17 PROCEDURE — 3051F HG A1C>EQUAL 7.0%<8.0%: CPT | Mod: CPTII,95,, | Performed by: NURSE PRACTITIONER

## 2025-06-17 PROCEDURE — 3066F NEPHROPATHY DOC TX: CPT | Mod: CPTII,95,, | Performed by: NURSE PRACTITIONER

## 2025-06-17 PROCEDURE — 98006 SYNCH AUDIO-VIDEO EST MOD 30: CPT | Mod: 95,,, | Performed by: NURSE PRACTITIONER

## 2025-06-17 PROCEDURE — 95251 CONT GLUC MNTR ANALYSIS I&R: CPT | Mod: NDTC,,, | Performed by: NURSE PRACTITIONER

## 2025-06-17 RX ORDER — INSULIN GLARGINE 100 [IU]/ML
INJECTION, SOLUTION SUBCUTANEOUS
Qty: 15 ML | Refills: 3 | Status: SHIPPED | OUTPATIENT
Start: 2025-06-17

## 2025-06-17 RX ORDER — INSULIN PUMP SYRINGE, 3 ML
EACH MISCELLANEOUS
Qty: 1 EACH | Refills: 0 | Status: SHIPPED | OUTPATIENT
Start: 2025-06-17

## 2025-06-17 RX ORDER — LANCETS
EACH MISCELLANEOUS
Qty: 200 EACH | Refills: 3 | Status: SHIPPED | OUTPATIENT
Start: 2025-06-17

## 2025-06-17 NOTE — PROGRESS NOTES
The patient location is: [place]  The chief complaint leading to consultation is: diabetes    Visit type: audiovisual    Face to Face time with patient: 24 mins  31 minutes of total time spent on the encounter, which includes face to face time and non-face to face time preparing to see the patient (eg, review of tests), Obtaining and/or reviewing separately obtained history, Documenting clinical information in the electronic or other health record, Independently interpreting results (not separately reported) and communicating results to the patient/family/caregiver, or Care coordination (not separately reported).     Each patient to whom he or she provides medical services by telemedicine is:  (1) informed of the relationship between the physician and patient and the respective role of any other health care provider with respect to management of the patient; and (2) notified that he or she may decline to receive medical services by telemedicine and may withdraw from such care at any time.          CC: This 65 y.o. female presents for management of diabetes  and chronic conditions pending review including , HLP, afib, BJ, CHF, morbid obesity    HPI: She was diagnosed with T2DM in the 1990s. Has  been hospitalized r/t DM for bg in the 500s.   Family hx of DM: daughter  Her granddaughter passed away on May 5th 2023    Reports she is having more health issues  Having angiogram Monday ([facility]), lots of stress, her daughter is no longer involved in her life  It has been difficult for her to mgt meds and MD appts  Has not been taking meds regularly   Also had 3 iron infusions  + back pain- following w NS in [place]    See dexcom download in Media tab  48% Very High  28% High  23% In Range  1% Low  <1% Very Low  Bg are not well controlled  Has not been consistently taking her insulin, until the past week  Still with large excursions    Diet: Eats 1 Meals a day, snacks- fruit, tomatoes   Eats lunch  Exercise: none  CURRENT  DM MEDS:   metformin 1000 mg bid, Mounjaro 10 weekly, (Sunday) , Novolog 6 u AC + SSI if bg > 250, Lantus 22 u qhs  Previous meds-   farxiga- rash  Jardiance -coverage issues?   Reports she is taking 5-15 mins prior to a meal:   no  Vial/pen:  Uses pens  Glucometer type:  Jocelyn contour    Standards of Care:  Eye exam: 9/2024 -      Cardiologist follows w Dr Williamson     ROS:   Gen: Appetite good,   Resp: no SOB or LEMOS   Cardiac: No palpitations, chest pain, +BLE edema  GI: No nausea or vomiting, diarrhea, constipation, +GERD  /GYN: 2+nocturia, no burning or pain.   MS/Neuro:+ numbness/ tingling in BLE;  speech clear  Psych: Denies drug/ETOH abuse, + depression.  Other systems: negative.    PE:  GENERAL: Well developed, well nourished.  PSYCH: AAOx3, appropriate mood and affect, pleasant expression, conversant, appears relaxed, well groomed.   EYES: Conjunctiva, corneas clear  NECK: Supple, trachea midline,       Personally reviewed Past Medical, Surgical, Social History.    There were no vitals taken for this visit.     Personally reviewed the below labs:      Chemistry        Component Value Date/Time     05/15/2025 1157     03/11/2025 1357    K 2.9 (L) 05/15/2025 1157    K 3.1 (L) 03/11/2025 1357    CL 95 05/15/2025 1157     03/11/2025 1357    CO2 31 (H) 05/15/2025 1157    CO2 30 (H) 03/11/2025 1357    BUN 14 05/15/2025 1157    CREATININE 1.0 05/15/2025 1157     (H) 05/15/2025 1157     (H) 03/11/2025 1357        Component Value Date/Time    CALCIUM 9.1 05/15/2025 1157    CALCIUM 8.6 (L) 03/11/2025 1357    ALKPHOS 117 03/11/2025 1357    AST 23 03/11/2025 1357    ALT 15 03/11/2025 1357    BILITOT 0.3 03/11/2025 1357    ESTGFRAFRICA >60 11/21/2019 0526    EGFRNONAA >60 11/21/2019 0526            Lab Results   Component Value Date    TSH 0.645 02/07/2025       Recent Labs   Lab 10/29/24  1411   LDL Cholesterol 42.6 L   HDL 25 L   Cholesterol 108 L        No results found for this or any  previous visit.  Results for orders placed or performed in visit on 04/16/24   Calcitriol    Collection Time: 04/16/24  1:56 PM   Result Value Ref Range    Vit D, 1,25-Dihydroxy 55 20 - 79 pg/mL       Lab Results   Component Value Date    MICALBCREAT Unable to calculate 10/29/2024       Hemoglobin A1C   Date Value Ref Range Status   02/21/2025 7.8 (H) 4.0 - 5.6 % Final     Comment:     ADA Screening Guidelines:  5.7-6.4%  Consistent with prediabetes  >or=6.5%  Consistent with diabetes    High levels of fetal hemoglobin interfere with the HbA1C  assay. Heterozygous hemoglobin variants (HbS, HgC, etc)do  not significantly interfere with this assay.   However, presence of multiple variants may affect accuracy.     10/29/2024 8.2 (H) 4.0 - 5.6 % Final     Comment:     ADA Screening Guidelines:  5.7-6.4%  Consistent with prediabetes  >or=6.5%  Consistent with diabetes    High levels of fetal hemoglobin interfere with the HbA1C  assay. Heterozygous hemoglobin variants (HbS, HgC, etc)do  not significantly interfere with this assay.   However, presence of multiple variants may affect accuracy.     08/30/2024 6.9 (H) 4.0 - 5.6 % Final     Comment:     ADA Screening Guidelines:  5.7-6.4%  Consistent with prediabetes  >or=6.5%  Consistent with diabetes    High levels of fetal hemoglobin interfere with the HbA1C  assay. Heterozygous hemoglobin variants (HbS, HgC, etc)do  not significantly interfere with this assay.   However, presence of multiple variants may affect accuracy.          ASSESSMENT and PLAN:      1. T2DM with hyperglycemia, DM PN, microalbuminuria   Increase Lantus to 24 units, Continue Novolog 6 u AC +correction 180/25- before meals only   Continue metformin 1000 mg bid;  Mounjaro 12.5  mg weekly   Start Jardiance 10 mg once a day  x1 month, then increase to 25 mg once a day indefinitely.   Discussed MOA, possible side effects including yeast infection, UTI, dehydration and ketoacidosis, importance of maintaining  hydration and avoiding low carb diets.   Continue Dexcom-  notify me for issues     2. HLP - on statin therapy,    3. H/o afib, CHF- follows w Dr Williamson, avoid TZD      4. Depression- being treated by PCP     5.Obesity- activity limited r/t limited mobility  There is no height or weight on file to calculate BMI.      Follow-up: in 3 months with A1C

## 2025-06-30 ENCOUNTER — TELEPHONE (OUTPATIENT)
Dept: ENDOCRINOLOGY | Facility: CLINIC | Age: 65
End: 2025-06-30
Payer: MEDICARE

## 2025-06-30 NOTE — TELEPHONE ENCOUNTER
Copied from CRM #[id number]. Topic: General Inquiry - Patient Advice  >> Jun 30, 2025  1:45 PM Rinku wrote:  Type: Needs Medical Advice  Who Called:  Patient    Best Call Back Number: [phone]   Additional Information: Called to speak with office. Was given steroids for angiogram, blood sugar high. Please advise

## 2025-07-01 ENCOUNTER — PATIENT MESSAGE (OUTPATIENT)
Dept: ENDOCRINOLOGY | Facility: CLINIC | Age: 65
End: 2025-07-01
Payer: MEDICARE

## 2025-07-02 ENCOUNTER — TELEPHONE (OUTPATIENT)
Dept: ENDOCRINOLOGY | Facility: CLINIC | Age: 65
End: 2025-07-02
Payer: MEDICARE

## 2025-07-02 DIAGNOSIS — R80.9 TYPE 2 DIABETES MELLITUS WITH MICROALBUMINURIA, WITH LONG-TERM CURRENT USE OF INSULIN: ICD-10-CM

## 2025-07-02 DIAGNOSIS — E11.65 TYPE 2 DIABETES MELLITUS WITH HYPERGLYCEMIA, WITH LONG-TERM CURRENT USE OF INSULIN: ICD-10-CM

## 2025-07-02 DIAGNOSIS — E11.29 TYPE 2 DIABETES MELLITUS WITH MICROALBUMINURIA, WITH LONG-TERM CURRENT USE OF INSULIN: ICD-10-CM

## 2025-07-02 DIAGNOSIS — Z79.4 TYPE 2 DIABETES MELLITUS WITH HYPERGLYCEMIA, WITH LONG-TERM CURRENT USE OF INSULIN: ICD-10-CM

## 2025-07-02 DIAGNOSIS — Z79.4 TYPE 2 DIABETES MELLITUS WITH MICROALBUMINURIA, WITH LONG-TERM CURRENT USE OF INSULIN: ICD-10-CM

## 2025-07-02 RX ORDER — INSULIN GLARGINE 100 [IU]/ML
INJECTION, SOLUTION SUBCUTANEOUS
Qty: 15 ML | Refills: 3 | Status: SHIPPED | OUTPATIENT
Start: 2025-07-02

## 2025-07-02 RX ORDER — INSULIN ASPART 100 [IU]/ML
INJECTION, SOLUTION INTRAVENOUS; SUBCUTANEOUS
Qty: 30 ML | Refills: 11 | Status: SHIPPED | OUTPATIENT
Start: 2025-07-02

## 2025-07-02 NOTE — TELEPHONE ENCOUNTER
Pt called to clarify dosage of Lantus and Novolog , since her BG have still been high after her steroids which was last Monday.  She has two prescriptions, one is from her PCP and he had her taking 20 units Novolog w meals- she is currently taking 10 units with meals per you rinstrcutions post steroids and doing SS 30 mins after meals because she said her sugars are still high and that is the way they did her insulin in the hospital a while back.  I told her to take them together based on her BG prior to meals. She is very confused. She is taking Lantus 20 units - we told her to take 22 due to lows last week. She wants new RX with your name and directions so she can throw away old box of insulin  from PCP. He also gave her vials and she prefers pens. Do you want a virtual visit or send her to [place] ? I dont think she is dosing correctly.  She does want only you to prescribe DM meds . She also says she is taking Metformin 500mg BID and Jardiance 10mg daily.  Im pulling her Dexcom for you to review.

## 2025-07-17 ENCOUNTER — PATIENT MESSAGE (OUTPATIENT)
Dept: ENDOCRINOLOGY | Facility: CLINIC | Age: 65
End: 2025-07-17
Payer: MEDICARE

## 2025-07-17 DIAGNOSIS — Z79.4 TYPE 2 DIABETES MELLITUS WITH DIABETIC POLYNEUROPATHY, WITH LONG-TERM CURRENT USE OF INSULIN: ICD-10-CM

## 2025-07-17 DIAGNOSIS — E11.42 TYPE 2 DIABETES MELLITUS WITH DIABETIC POLYNEUROPATHY, WITH LONG-TERM CURRENT USE OF INSULIN: ICD-10-CM

## 2025-08-11 ENCOUNTER — PATIENT MESSAGE (OUTPATIENT)
Dept: ENDOCRINOLOGY | Facility: CLINIC | Age: 65
End: 2025-08-11
Payer: MEDICARE

## 2025-08-29 ENCOUNTER — TELEPHONE (OUTPATIENT)
Dept: ENDOCRINOLOGY | Facility: CLINIC | Age: 65
End: 2025-08-29
Payer: MEDICARE

## 2025-08-29 DIAGNOSIS — E11.42 TYPE 2 DIABETES MELLITUS WITH DIABETIC POLYNEUROPATHY, WITH LONG-TERM CURRENT USE OF INSULIN: Primary | ICD-10-CM

## 2025-08-29 DIAGNOSIS — Z79.4 TYPE 2 DIABETES MELLITUS WITH DIABETIC POLYNEUROPATHY, WITH LONG-TERM CURRENT USE OF INSULIN: Primary | ICD-10-CM
